# Patient Record
Sex: FEMALE | Race: WHITE | NOT HISPANIC OR LATINO | Employment: OTHER | URBAN - METROPOLITAN AREA
[De-identification: names, ages, dates, MRNs, and addresses within clinical notes are randomized per-mention and may not be internally consistent; named-entity substitution may affect disease eponyms.]

---

## 2020-04-03 ENCOUNTER — TRANSFERRED RECORDS (OUTPATIENT)
Dept: HEALTH INFORMATION MANAGEMENT | Facility: CLINIC | Age: 35
End: 2020-04-03

## 2020-04-10 ENCOUNTER — TRANSFERRED RECORDS (OUTPATIENT)
Dept: HEALTH INFORMATION MANAGEMENT | Facility: CLINIC | Age: 35
End: 2020-04-10

## 2020-04-10 LAB
HPV ABSTRACT: NORMAL
PAP-ABSTRACT: NORMAL

## 2020-07-16 ENCOUNTER — TRANSFERRED RECORDS (OUTPATIENT)
Dept: HEALTH INFORMATION MANAGEMENT | Facility: CLINIC | Age: 35
End: 2020-07-16

## 2021-02-05 ENCOUNTER — TRANSFERRED RECORDS (OUTPATIENT)
Dept: HEALTH INFORMATION MANAGEMENT | Facility: CLINIC | Age: 36
End: 2021-02-05

## 2021-02-25 ENCOUNTER — TRANSFERRED RECORDS (OUTPATIENT)
Dept: HEALTH INFORMATION MANAGEMENT | Facility: CLINIC | Age: 36
End: 2021-02-25

## 2021-06-04 LAB
ALT SERPL-CCNC: 20 U/L (ref 8–45)
AST SERPL-CCNC: 15 U/L (ref 5–41)
TSH SERPL-ACNC: 0.54 UIU/ML (ref 0.47–5)

## 2021-06-15 ENCOUNTER — TRANSFERRED RECORDS (OUTPATIENT)
Dept: HEALTH INFORMATION MANAGEMENT | Facility: CLINIC | Age: 36
End: 2021-06-15

## 2021-07-15 ENCOUNTER — TRANSFERRED RECORDS (OUTPATIENT)
Dept: HEALTH INFORMATION MANAGEMENT | Facility: CLINIC | Age: 36
End: 2021-07-15

## 2021-08-13 ENCOUNTER — TRANSFERRED RECORDS (OUTPATIENT)
Dept: HEALTH INFORMATION MANAGEMENT | Facility: CLINIC | Age: 36
End: 2021-08-13

## 2021-08-15 ENCOUNTER — TRANSFERRED RECORDS (OUTPATIENT)
Dept: MULTI SPECIALTY CLINIC | Facility: CLINIC | Age: 36
End: 2021-08-15

## 2021-08-15 LAB
CREATININE (EXTERNAL): 0.97 MG/DL (ref 0.55–1.02)
GFR ESTIMATED (EXTERNAL): >60 ML/MIN
GFR ESTIMATED (IF AFRICAN AMERICAN) (EXTERNAL): >60 ML/MIN
GLUCOSE (EXTERNAL): 98 MG/DL (ref 70–110)
POTASSIUM (EXTERNAL): 4 MMOL/L (ref 3.5–5.1)

## 2021-09-23 ENCOUNTER — MEDICAL CORRESPONDENCE (OUTPATIENT)
Dept: HEALTH INFORMATION MANAGEMENT | Facility: CLINIC | Age: 36
End: 2021-09-23

## 2021-09-24 ENCOUNTER — TRANSCRIBE ORDERS (OUTPATIENT)
Dept: MULTI SPECIALTY CLINIC | Facility: CLINIC | Age: 36
End: 2021-09-24

## 2021-09-24 DIAGNOSIS — G89.29 CHRONIC JOINT PAIN: Primary | ICD-10-CM

## 2021-09-24 DIAGNOSIS — R76.8 POSITIVE DOUBLE STRANDED DNA ANTIBODY TEST: ICD-10-CM

## 2021-09-24 DIAGNOSIS — Z91.09 SUN ALLERGY: ICD-10-CM

## 2021-09-24 DIAGNOSIS — M25.50 CHRONIC JOINT PAIN: Primary | ICD-10-CM

## 2021-09-24 DIAGNOSIS — R70.0 ELEVATED ERYTHROCYTE SEDIMENTATION RATE: ICD-10-CM

## 2021-09-24 DIAGNOSIS — R21 BUTTERFLY RASH: ICD-10-CM

## 2021-10-13 ENCOUNTER — TRANSFERRED RECORDS (OUTPATIENT)
Dept: HEALTH INFORMATION MANAGEMENT | Facility: CLINIC | Age: 36
End: 2021-10-13
Payer: COMMERCIAL

## 2021-10-15 ENCOUNTER — TELEPHONE (OUTPATIENT)
Dept: RHEUMATOLOGY | Facility: CLINIC | Age: 36
End: 2021-10-15

## 2021-10-15 NOTE — TELEPHONE ENCOUNTER
Records/ office notes from [facility] received by fax.   Patient have appointment in January with Dr. Herrera.   Records sent to abstract for scanning.     STEPHEN Cortés [facility]

## 2021-11-22 ENCOUNTER — TELEPHONE (OUTPATIENT)
Dept: RHEUMATOLOGY | Facility: CLINIC | Age: 36
End: 2021-11-22
Payer: MEDICAID

## 2021-11-22 NOTE — TELEPHONE ENCOUNTER
Called patient and offered her an appt this week that became available due to a cancellation. Patient was agreeable and accepted.     LEIDA HankinsN, RN   Rheumatology RN Care Coordinator   [facility]   [phone]

## 2021-11-22 NOTE — TELEPHONE ENCOUNTER
[facility] Call Center    Phone Message    May a detailed message be left on voicemail: yes     Reason for Call: Other: Pt is calling for an urgent appointment /sooner appointment    Pt was told by her Dermatologist and PCP that Pt she needs to be seen sooner than January 2022.  Pt states her dermatologist had sent over records in October and requested if the Rheumatologist can get her in before January to be seen.    Pt's appt on January 12, 2022 with Dr. Neri needed to be reschedule for Dr. Herrera's schedule in  is changing in the new year.   Pt is upset and does not want to wait even longer to be seen.  Pt was reschedule for February 2022 now.     Pt states she is having more records sent over via fax but most records to be available through care everywhere.    Pt would like a call back from Dr. Herrera's team to let her know if they are able to get her in soon.    Action Taken: Message routed to:  Adult Clinics: Rheumatology p [phone]

## 2021-11-22 NOTE — TELEPHONE ENCOUNTER
There are no sooner appointments. Please add patient to waitlist and have them call periodically to check for cancellations.     LEIDA HankinsN, RN   Rheumatology RN Care Coordinator   [facility]   [phone]

## 2021-11-24 ENCOUNTER — OFFICE VISIT (OUTPATIENT)
Dept: RHEUMATOLOGY | Facility: CLINIC | Age: 36
End: 2021-11-24
Payer: MEDICAID

## 2021-11-24 VITALS
HEIGHT: 67 IN | OXYGEN SATURATION: 98 % | SYSTOLIC BLOOD PRESSURE: 118 MMHG | WEIGHT: 192 LBS | DIASTOLIC BLOOD PRESSURE: 83 MMHG | BODY MASS INDEX: 30.13 KG/M2 | HEART RATE: 99 BPM

## 2021-11-24 DIAGNOSIS — R76.8 POSITIVE DOUBLE STRANDED DNA ANTIBODY TEST: ICD-10-CM

## 2021-11-24 DIAGNOSIS — G89.29 CHRONIC JOINT PAIN: ICD-10-CM

## 2021-11-24 DIAGNOSIS — Z91.09 SUN ALLERGY: ICD-10-CM

## 2021-11-24 DIAGNOSIS — M25.50 CHRONIC JOINT PAIN: ICD-10-CM

## 2021-11-24 DIAGNOSIS — R21 BUTTERFLY RASH: ICD-10-CM

## 2021-11-24 DIAGNOSIS — R70.0 ELEVATED ERYTHROCYTE SEDIMENTATION RATE: ICD-10-CM

## 2021-11-24 PROBLEM — R07.1 CHEST PAIN ON BREATHING: Status: ACTIVE | Noted: 2021-11-24

## 2021-11-24 PROBLEM — R53.83 FATIGUE: Status: ACTIVE | Noted: 2021-11-24

## 2021-11-24 PROBLEM — L56.8 PHOTOSENSITIVITY: Status: ACTIVE | Noted: 2021-11-24

## 2021-11-24 LAB
ALBUMIN SERPL-MCNC: 3.9 G/DL (ref 3.4–5)
ALP SERPL-CCNC: 106 U/L (ref 40–150)
ALT SERPL W P-5'-P-CCNC: 33 U/L (ref 0–50)
ANION GAP SERPL CALCULATED.3IONS-SCNC: 5 MMOL/L (ref 3–14)
AST SERPL W P-5'-P-CCNC: 18 U/L (ref 0–45)
BILIRUB SERPL-MCNC: 0.5 MG/DL (ref 0.2–1.3)
BUN SERPL-MCNC: 13 MG/DL (ref 7–30)
CALCIUM SERPL-MCNC: 9.6 MG/DL (ref 8.5–10.1)
CHLORIDE BLD-SCNC: 107 MMOL/L (ref 94–109)
CO2 SERPL-SCNC: 28 MMOL/L (ref 20–32)
CREAT SERPL-MCNC: 0.72 MG/DL (ref 0.52–1.04)
CRP SERPL-MCNC: 3.1 MG/L (ref 0–8)
ERYTHROCYTE [DISTWIDTH] IN BLOOD BY AUTOMATED COUNT: 12.1 % (ref 10–15)
ERYTHROCYTE [SEDIMENTATION RATE] IN BLOOD BY WESTERGREN METHOD: 18 MM/HR (ref 0–20)
GFR SERPL CREATININE-BSD FRML MDRD: >90 ML/MIN/1.73M2
GLUCOSE BLD-MCNC: 99 MG/DL (ref 70–99)
HCT VFR BLD AUTO: 42.3 % (ref 35–47)
HGB BLD-MCNC: 14.1 G/DL (ref 11.7–15.7)
MCH RBC QN AUTO: 28.9 PG (ref 26.5–33)
MCHC RBC AUTO-ENTMCNC: 33.3 G/DL (ref 31.5–36.5)
MCV RBC AUTO: 87 FL (ref 78–100)
PLATELET # BLD AUTO: 395 10E3/UL (ref 150–450)
POTASSIUM BLD-SCNC: 3.9 MMOL/L (ref 3.4–5.3)
PROT SERPL-MCNC: 8.3 G/DL (ref 6.8–8.8)
RBC # BLD AUTO: 4.88 10E6/UL (ref 3.8–5.2)
SODIUM SERPL-SCNC: 140 MMOL/L (ref 133–144)
WBC # BLD AUTO: 7.1 10E3/UL (ref 4–11)

## 2021-11-24 PROCEDURE — 85613 RUSSELL VIPER VENOM DILUTED: CPT | Performed by: STUDENT IN AN ORGANIZED HEALTH CARE EDUCATION/TRAINING PROGRAM

## 2021-11-24 PROCEDURE — 86160 COMPLEMENT ANTIGEN: CPT | Performed by: STUDENT IN AN ORGANIZED HEALTH CARE EDUCATION/TRAINING PROGRAM

## 2021-11-24 PROCEDURE — 86235 NUCLEAR ANTIGEN ANTIBODY: CPT | Performed by: STUDENT IN AN ORGANIZED HEALTH CARE EDUCATION/TRAINING PROGRAM

## 2021-11-24 PROCEDURE — 85730 THROMBOPLASTIN TIME PARTIAL: CPT | Performed by: STUDENT IN AN ORGANIZED HEALTH CARE EDUCATION/TRAINING PROGRAM

## 2021-11-24 PROCEDURE — 85390 FIBRINOLYSINS SCREEN I&R: CPT | Performed by: PATHOLOGY

## 2021-11-24 PROCEDURE — 36415 COLL VENOUS BLD VENIPUNCTURE: CPT | Performed by: STUDENT IN AN ORGANIZED HEALTH CARE EDUCATION/TRAINING PROGRAM

## 2021-11-24 PROCEDURE — 86225 DNA ANTIBODY NATIVE: CPT | Performed by: STUDENT IN AN ORGANIZED HEALTH CARE EDUCATION/TRAINING PROGRAM

## 2021-11-24 PROCEDURE — 86147 CARDIOLIPIN ANTIBODY EA IG: CPT | Performed by: STUDENT IN AN ORGANIZED HEALTH CARE EDUCATION/TRAINING PROGRAM

## 2021-11-24 PROCEDURE — 86140 C-REACTIVE PROTEIN: CPT | Performed by: STUDENT IN AN ORGANIZED HEALTH CARE EDUCATION/TRAINING PROGRAM

## 2021-11-24 PROCEDURE — 86200 CCP ANTIBODY: CPT | Performed by: STUDENT IN AN ORGANIZED HEALTH CARE EDUCATION/TRAINING PROGRAM

## 2021-11-24 PROCEDURE — 85027 COMPLETE CBC AUTOMATED: CPT | Performed by: STUDENT IN AN ORGANIZED HEALTH CARE EDUCATION/TRAINING PROGRAM

## 2021-11-24 PROCEDURE — 80053 COMPREHEN METABOLIC PANEL: CPT | Performed by: STUDENT IN AN ORGANIZED HEALTH CARE EDUCATION/TRAINING PROGRAM

## 2021-11-24 PROCEDURE — 86431 RHEUMATOID FACTOR QUANT: CPT | Performed by: STUDENT IN AN ORGANIZED HEALTH CARE EDUCATION/TRAINING PROGRAM

## 2021-11-24 PROCEDURE — 86038 ANTINUCLEAR ANTIBODIES: CPT | Performed by: STUDENT IN AN ORGANIZED HEALTH CARE EDUCATION/TRAINING PROGRAM

## 2021-11-24 PROCEDURE — 85652 RBC SED RATE AUTOMATED: CPT | Performed by: STUDENT IN AN ORGANIZED HEALTH CARE EDUCATION/TRAINING PROGRAM

## 2021-11-24 PROCEDURE — 99204 OFFICE O/P NEW MOD 45 MIN: CPT | Performed by: STUDENT IN AN ORGANIZED HEALTH CARE EDUCATION/TRAINING PROGRAM

## 2021-11-24 PROCEDURE — 86146 BETA-2 GLYCOPROTEIN ANTIBODY: CPT | Performed by: STUDENT IN AN ORGANIZED HEALTH CARE EDUCATION/TRAINING PROGRAM

## 2021-11-24 ASSESSMENT — MIFFLIN-ST. JEOR: SCORE: 1603.54

## 2021-11-24 ASSESSMENT — PAIN SCALES - GENERAL: PAINLEVEL: SEVERE PAIN (6)

## 2021-11-24 NOTE — NURSING NOTE
"Chief Complaint   Patient presents with     Consult     chronic joint pain, butterfy rash     New Patient     referred by [facility]       Initial /83 (BP Location: Left arm, Patient Position: Sitting, Cuff Size: Adult Large)   Pulse 99   Ht 1.702 m (5' 7\")   Wt 87.1 kg (192 lb)   SpO2 98%   BMI 30.07 kg/m   Estimated body mass index is 30.07 kg/m  as calculated from the following:    Height as of this encounter: 1.702 m (5' 7\").    Weight as of this encounter: 87.1 kg (192 lb)..      She requests these members of her care team be copied on today's visit information:     PCP: Kaushik Luna    Referring Provider:  [facility]  [id number] [place] [id number]    Do you need any medication refills at today's visit?      Ashley Barron MA   Email: [email or web address]  [facility] - Rheumatology  Phone: [phone]  Fax: [phone]      "

## 2021-11-24 NOTE — PROGRESS NOTES
Rheumatology Clinic Visit     Dasia Nguyen MRN# [id number]   YOB: 1985 Age: 36 year old     Date of Visit: Nov 24, 2021   Primary care provider: Kaushik Luna          Assessment and Plan:     Assessment     Polyarthralgia  Facial rash - cutaneous lupus - seen dermatologist  Recurrent oral sores  Chest pain  Fatigue  Headaches  Neg AYAN, RF, ACPA  +dsDNA - 11, Neg SSA/SSB, centromere, Scl-70, Sm.     Ms. Nguyen is 36 year old female seen in clinic for evaluation of autoimmune connective tissue disease.    Positive dsDNA : For the last 2 years she has multiple symptoms including fatigue, joint pains, facial rash, photosensitivity, chest pain, recurrent oral sores. Her autoimmune work up showed negative AYAN but mildly elevated dsDNA which raised concern about systemic lupus. Her facial rash was diagnosed as acute cutaneous lupus by her dermatologist. On exam today she has mild tenderness over MCP, PIP joints, ankles and MTP joints but no synovitis noted.     I will repeat her autoimmune serologies - AYAN, KIANNA, dsDNA, C3/C4, ESR, CRP, CBC, CMP. Her symptom complex does raise concern about systemic lupus or undifferentiated connective tissue disease. No need of systemic steroids. Will give her information about Plaquenil.     Chest pain : Following up with Cardiologist. She has Holter monitor placed. Reports chest pain episodes when she takes deep breaths.     Seizure like episodes : Seen by Neurology, and EEG is normal. She is getting cardiac evaluation to make sure that seizure like episodes are not related to arrhythmias       Plan    Blood tests to be done today     Send her information about Plaquenil     Your symptoms raise concern about systemic lupus     Follow up in 2 months     -- Orders placed this encounter  Orders Placed This Encounter   Procedures     Anti Nuclear Cathi IgG by IFA with Reflex     KIANNA antibody panel     Complement C3     Complement C4     DNA ANTIBODY, NATV/2 STRAND      Centromere Antibody IgG     Erythrocyte sedimentation rate auto     CRP inflammation     CBC with platelets     Comprehensive metabolic panel     Rheumatoid factor     Cyclic Citrullinated Peptide Antibody IgG     Lupus Anticoagulant Panel     Beta 2 Glycoprotein 1 Antibody IgG     Beta 2 Glycoprotein 1 Antibody IgM     Cardiolipin Cathi IgG and IgM       TIME SPENT:   A total of 60 minutes was spent on the patient today, greater than 50% of that time was spent on face to face counseling and care coordination regarding diagnoses and treatment options as mentioned above.                    Active Problem List:     Patient Active Problem List    Diagnosis Date Noted     Chest pain on breathing 11/24/2021     Priority: Medium     Fatigue 11/24/2021     Priority: Medium     Multiple joint pain 11/24/2021     Priority: Medium     Photosensitivity 11/24/2021     Priority: Medium            History of Present Illness:   Dasia Nguyen is a 36 year old female with PMH of facial rash, joint pains, chest pain, +dsdna seen in the clinic in consultation at request of  for evaluation of autoimmune connective tissue disease.     She was in her usual state of health up until 3 years ago when she started noticing fatigue, joint pains, on and off chest pains. Her symptoms came as flare ups when all her symptoms would increase many folds. Her last severe flare was summer 2021 She had severe body aches, joint pains and could not get out of bed for couple weeks. In addition she was extremely fatigued and had headaches, dizziness, chest pains and oral sores. She also noticed being very photosensitive and developed facial rash. She saw [facility] who diagnosed her with acute cutaneous lupus.     Beginning May 2021, she started having seizures like episodes at night. She describes it as hot tingling sensation and her body will shake for 15 - 20 sec. She has fallen out of bed during those episodes.  She was seen by Neurologist and had EEG which did not show epilepsy. MRI head, C-spine, T-spine did not show demyelinated lesions. She was referred to cardiology to make sure that her symptoms are not related to Arrhythmias. Echocardiogram was normal. CT chest showed some calcification in the region of LAD.     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 Her ankles will hurt. In the morning she does not have strength to grasp things. She gets pain in her wrists, elbows. She has fatigue and feel she has flu every day. She has headaches every day. She takes naps during the day. Her chest hurt, feel as if someone is stepping on it. She always take shallow breaths. She was given medrol dos russ and responded well to it.     She has herniated discs in her back and always had chronic low back pain.    She gets recurrent UTI and possibly had kidney stones.     Autoimmune work up showed Neg AYAN, KIANNA, RF, ACPA but positive dsDNA. She had mildly elevated ESR of 26 in 6/2021.   Denies any raynauds, sicca symptoms, recurrent sinusitis/rhinitis, swallowing difficulty, hearing or visual changes recently. No h/o arterial/venous thrombosis in the past. Hx of one 5 week pregnancy loss.           Review of Systems:     Review Of Systems  Constitutional: denies fever, chills, night sweats and weight loss.  Skin: + skin rash.  Eyes: No dryness or irritation in eyes. No episode of eye inflammation or redness.   Ears/Nose/Throat: no recurrent sinus infections.  Respiratory: No shortness of breath, dyspnea on exertion, cough, or hemoptysis  Cardiovascular: + chest pain or palpitations.  Gastrointestinal: no nausea, vomiting, abdominal pain.  Normal bowel movements.  Genitourinary: no dysuria, frequency  or hematuria.  Musculoskeletal: as in HPI  Neurologic: no numbness, tingling.  Psychiatric: no mood disorders.  Hematologic/Lymphatic/Immunologic: no history of easy bruising, petechia or purpura.  No abnormal  "bleeding.   Endocrine: no h/o thyroid disease or Diabetes.                  Past Medical History:     Past Medical History:   Diagnosis Date     Chest pain on breathing      Fatigue      Multiple joint pain      Photosensitivity      Past Surgical History:   Procedure Laterality Date     NO HISTORY OF SURGERY              Social History:     Social History     Occupational History     Not on file   Tobacco Use     Smoking status: Never Smoker     Smokeless tobacco: Never Used   Substance and Sexual Activity     Alcohol use: Not Currently     Drug use: Never     Sexual activity: Not on file            Family History:     Family History   Problem Relation Age of Onset     Lupus Maternal Uncle      Sarcoidosis Maternal Aunt             Allergies:     Allergies   Allergen Reactions     Other Environmental Allergy      Cat dander            Medications:     Current Outpatient Medications   Medication Sig Dispense Refill     Multiple Vitamin (MULTIVITAMINS PO)               Physical Exam:   Blood pressure 118/83, pulse 99, height 1.702 m (5' 7\"), weight 87.1 kg (192 lb), SpO2 98 %.  Wt Readings from Last 4 Encounters:   11/24/21 87.1 kg (192 lb)       Constitutional: obesity, appearing stated age; cooperative  Eyes: nl EOM, PERRLA, vision, conjunctiva, sclera  ENT: nl external ears, nose, hearing, lips, teeth, gums, throat  No mucous membrane lesions, normal saliva pool  Neck: no mass or thyroid enlargement  Resp: lungs clear to auscultation, nl to palpation  CV: RRR, no murmurs, rubs or gallops, no edema  GI: no ABD mass or tenderness, no HSM  : not tested  Lymph: no cervical, supraclavicular, inguinal or epitrochlear nodes    MS: All TMJ, neck, shoulder, elbow, wrist, MCP/PIP/DIP, spine, hip, knee, ankle, and foot MTP/IP joints were examined.   -- Tenderness over MCP, PIP joints, b/l wrists, elbows, ankles. no synovitis noted. Full ROM.  Normal  strength.   -- No dactylitis,  tenosynovitis, enthesopathy.    Skin: " no nail pitting, alopecia, rash, nodules or lesions  Neuro: nl cranial nerves, strength, sensation, DTRs.   Psych: nl judgement, orientation, memory, affect.         Data:     No results found for any visits on 11/24/21.    No results for input(s): WBC, RBC, HGB, HCT, MCV, RDW, PLT, CCPABG, ALBUMIN, CRP, BUN, CREAT in the last 52432 hours.    Invalid input(s): MCT,  AST,  ALT,  ESR   No results for input(s): TSH, T4 in the last 84256 hours.  No results found for: CCPABG, CCPABY, RF8, HGB, BUN, CREAT, SED, CRP, AST, CARIA, ANCA, HU0190, ALBUMIN, ALKPHOS, ALT, AST, [id number], [id number], CRP, [id number], RHF  No lab results found.    Invalid input(s): SEDRATE, TBILI, NH3     Outside studies reviewed: Results from [facility] reviewed     Reviewed Rheumatology lab flowsheet    Felicita Herrera MD  [facility]  Department of Rheumatology & Autoimmune Disorders  [facility]: [phone]   Pager - [phone]

## 2021-11-26 LAB
ANA SER QL IF: NEGATIVE
B2 GLYCOPROT1 IGG SERPL IA-ACNC: <0.8 U/ML
B2 GLYCOPROT1 IGM SERPL IA-ACNC: <2.4 U/ML
C3 SERPL-MCNC: 153 MG/DL (ref 81–157)
C4 SERPL-MCNC: 38 MG/DL (ref 13–39)
CARDIOLIPIN IGG SER IA-ACNC: <2 GPL-U/ML
CARDIOLIPIN IGG SER IA-ACNC: NEGATIVE
CARDIOLIPIN IGM SER IA-ACNC: <2 MPL-U/ML
CARDIOLIPIN IGM SER IA-ACNC: NEGATIVE
CCP AB SER IA-ACNC: 1.1 U/ML
CENTROMERE B AB SER-ACNC: <0.7 U/ML
CENTROMERE B AB SER-ACNC: NEGATIVE
DRVVT SCREEN RATIO: 1.11
DSDNA AB SER-ACNC: 6 IU/ML
ENA SM IGG SER IA-ACNC: <1.6 U/ML
ENA SM IGG SER IA-ACNC: NEGATIVE
ENA SS-A AB SER IA-ACNC: <0.5 U/ML
ENA SS-A AB SER IA-ACNC: NEGATIVE
ENA SS-B IGG SER IA-ACNC: <0.6 U/ML
ENA SS-B IGG SER IA-ACNC: NEGATIVE
INR PPP: 1.07 (ref 0.85–1.15)
LA PPP-IMP: NEGATIVE
LUPUS INTERPRETATION: NORMAL
PTT RATIO: 1.13
RHEUMATOID FACT SER NEPH-ACNC: <7 IU/ML
THROMBIN TIME: 17.1 SECONDS (ref 13–19)
U1 SNRNP IGG SER IA-ACNC: <1.1 U/ML
U1 SNRNP IGG SER IA-ACNC: NEGATIVE

## 2021-12-01 NOTE — RESULT ENCOUNTER NOTE
Arlyn Rossi,    Your autoimmune work up has shown negative AYAN and dsDNA. Other specific tests for lupus including anti Dougherty antibody is negative as well. Rheumatoid arthritis tests are negative. Antiphospholipid labs are negative. I can understand that during flares your inflammation markers, complement levels and dsDNA can change but in systemic lupus patients AYAN and other antibodies do not change weather in flare or not. You do not  meet criteria for systemic lupus but because of your facial rash possible acute cutaneous lupus we can try Plaquenil and hope that it helps with other symptoms too. Let me know if you have any questions.     Felicita Herrera MD

## 2021-12-04 ENCOUNTER — HEALTH MAINTENANCE LETTER (OUTPATIENT)
Age: 36
End: 2021-12-04

## 2022-01-09 ENCOUNTER — TRANSFERRED RECORDS (OUTPATIENT)
Dept: HEALTH INFORMATION MANAGEMENT | Facility: CLINIC | Age: 37
End: 2022-01-09
Payer: COMMERCIAL

## 2022-04-02 DIAGNOSIS — L93.2 CUTANEOUS LUPUS ERYTHEMATOSUS: ICD-10-CM

## 2022-04-02 DIAGNOSIS — R76.8 POSITIVE DOUBLE STRANDED DNA ANTIBODY TEST: ICD-10-CM

## 2022-04-04 RX ORDER — HYDROXYCHLOROQUINE SULFATE 200 MG/1
TABLET, FILM COATED ORAL
Qty: 180 TABLET | OUTPATIENT
Start: 2022-04-04

## 2022-04-05 NOTE — TELEPHONE ENCOUNTER
Plaquenil    Please complete annual eye exam/plaquenil screening for refills. - Oral  Last Written Prescription Date:  3/25/22  Last Fill Quantity: 60,   # refills: 1   Last Office Visit : 11/24/21  Future Office visit:  6/29/22  Last Eye Exam: not found, per chart,MyChart sent to patient - eye exam 4/6/22 outside. Further refills after eye exam documentation obtained.

## 2022-05-09 ENCOUNTER — MYC MEDICAL ADVICE (OUTPATIENT)
Dept: RHEUMATOLOGY | Facility: CLINIC | Age: 37
End: 2022-05-09
Payer: COMMERCIAL

## 2022-06-07 NOTE — TELEPHONE ENCOUNTER
Plaquenil eye exam abstracted into flow sheets. Letter to be scanned into chart.      STEFANIA Hester  Rheumatology/Infectious disease  [facility]   [phone]

## 2022-07-06 ENCOUNTER — OFFICE VISIT (OUTPATIENT)
Dept: RHEUMATOLOGY | Facility: CLINIC | Age: 37
End: 2022-07-06
Payer: COMMERCIAL

## 2022-07-06 VITALS
OXYGEN SATURATION: 99 % | WEIGHT: 194 LBS | HEART RATE: 75 BPM | SYSTOLIC BLOOD PRESSURE: 103 MMHG | DIASTOLIC BLOOD PRESSURE: 72 MMHG | BODY MASS INDEX: 30.38 KG/M2

## 2022-07-06 DIAGNOSIS — L93.2 CUTANEOUS LUPUS ERYTHEMATOSUS: ICD-10-CM

## 2022-07-06 DIAGNOSIS — G89.29 CHRONIC JOINT PAIN: ICD-10-CM

## 2022-07-06 DIAGNOSIS — M25.50 CHRONIC JOINT PAIN: ICD-10-CM

## 2022-07-06 DIAGNOSIS — R76.8 POSITIVE DOUBLE STRANDED DNA ANTIBODY TEST: Primary | ICD-10-CM

## 2022-07-06 LAB
ALBUMIN SERPL-MCNC: 3.8 G/DL (ref 3.4–5)
ALBUMIN UR-MCNC: NEGATIVE MG/DL
ALP SERPL-CCNC: 89 U/L (ref 40–150)
ALT SERPL W P-5'-P-CCNC: 22 U/L (ref 0–50)
ANION GAP SERPL CALCULATED.3IONS-SCNC: 3 MMOL/L (ref 3–14)
APPEARANCE UR: CLEAR
AST SERPL W P-5'-P-CCNC: 10 U/L (ref 0–45)
BILIRUB SERPL-MCNC: 0.6 MG/DL (ref 0.2–1.3)
BILIRUB UR QL STRIP: NEGATIVE
BUN SERPL-MCNC: 12 MG/DL (ref 7–30)
CALCIUM SERPL-MCNC: 9.7 MG/DL (ref 8.5–10.1)
CHLORIDE BLD-SCNC: 109 MMOL/L (ref 94–109)
CO2 SERPL-SCNC: 29 MMOL/L (ref 20–32)
COLOR UR AUTO: COLORLESS
CREAT SERPL-MCNC: 0.75 MG/DL (ref 0.52–1.04)
ERYTHROCYTE [DISTWIDTH] IN BLOOD BY AUTOMATED COUNT: 12.3 % (ref 10–15)
GFR SERPL CREATININE-BSD FRML MDRD: >90 ML/MIN/1.73M2
GLUCOSE BLD-MCNC: 108 MG/DL (ref 70–99)
GLUCOSE UR STRIP-MCNC: NEGATIVE MG/DL
HCT VFR BLD AUTO: 40.4 % (ref 35–47)
HGB BLD-MCNC: 13.6 G/DL (ref 11.7–15.7)
HGB UR QL STRIP: NEGATIVE
KETONES UR STRIP-MCNC: NEGATIVE MG/DL
LEUKOCYTE ESTERASE UR QL STRIP: NEGATIVE
MCH RBC QN AUTO: 28.6 PG (ref 26.5–33)
MCHC RBC AUTO-ENTMCNC: 33.7 G/DL (ref 31.5–36.5)
MCV RBC AUTO: 85 FL (ref 78–100)
NITRATE UR QL: NEGATIVE
PH UR STRIP: 6.5 [PH] (ref 5–7)
PLATELET # BLD AUTO: 314 10E3/UL (ref 150–450)
POTASSIUM BLD-SCNC: 4.4 MMOL/L (ref 3.4–5.3)
PROT SERPL-MCNC: 7.7 G/DL (ref 6.8–8.8)
RBC # BLD AUTO: 4.76 10E6/UL (ref 3.8–5.2)
RBC #/AREA URNS AUTO: NORMAL /HPF
SKIP: NORMAL
SODIUM SERPL-SCNC: 141 MMOL/L (ref 133–144)
SP GR UR STRIP: 1.01 (ref 1–1.03)
UROBILINOGEN UR STRIP-MCNC: NORMAL MG/DL
WBC # BLD AUTO: 6.2 10E3/UL (ref 4–11)
WBC #/AREA URNS AUTO: NORMAL /HPF

## 2022-07-06 PROCEDURE — 84156 ASSAY OF PROTEIN URINE: CPT | Performed by: STUDENT IN AN ORGANIZED HEALTH CARE EDUCATION/TRAINING PROGRAM

## 2022-07-06 PROCEDURE — 82657 ENZYME CELL ACTIVITY: CPT | Mod: 90 | Performed by: STUDENT IN AN ORGANIZED HEALTH CARE EDUCATION/TRAINING PROGRAM

## 2022-07-06 PROCEDURE — 36415 COLL VENOUS BLD VENIPUNCTURE: CPT | Performed by: STUDENT IN AN ORGANIZED HEALTH CARE EDUCATION/TRAINING PROGRAM

## 2022-07-06 PROCEDURE — 85027 COMPLETE CBC AUTOMATED: CPT | Performed by: STUDENT IN AN ORGANIZED HEALTH CARE EDUCATION/TRAINING PROGRAM

## 2022-07-06 PROCEDURE — 86160 COMPLEMENT ANTIGEN: CPT | Performed by: STUDENT IN AN ORGANIZED HEALTH CARE EDUCATION/TRAINING PROGRAM

## 2022-07-06 PROCEDURE — 86038 ANTINUCLEAR ANTIBODIES: CPT | Performed by: STUDENT IN AN ORGANIZED HEALTH CARE EDUCATION/TRAINING PROGRAM

## 2022-07-06 PROCEDURE — 99214 OFFICE O/P EST MOD 30 MIN: CPT | Performed by: STUDENT IN AN ORGANIZED HEALTH CARE EDUCATION/TRAINING PROGRAM

## 2022-07-06 PROCEDURE — 86225 DNA ANTIBODY NATIVE: CPT | Performed by: STUDENT IN AN ORGANIZED HEALTH CARE EDUCATION/TRAINING PROGRAM

## 2022-07-06 PROCEDURE — 81001 URINALYSIS AUTO W/SCOPE: CPT | Performed by: STUDENT IN AN ORGANIZED HEALTH CARE EDUCATION/TRAINING PROGRAM

## 2022-07-06 PROCEDURE — 80053 COMPREHEN METABOLIC PANEL: CPT | Performed by: STUDENT IN AN ORGANIZED HEALTH CARE EDUCATION/TRAINING PROGRAM

## 2022-07-06 PROCEDURE — 99000 SPECIMEN HANDLING OFFICE-LAB: CPT | Performed by: STUDENT IN AN ORGANIZED HEALTH CARE EDUCATION/TRAINING PROGRAM

## 2022-07-06 RX ORDER — DIPHENHYDRAMINE HCL 25 MG
2-4 TABLET ORAL EVERY 6 HOURS PRN
COMMUNITY

## 2022-07-06 ASSESSMENT — PAIN SCALES - GENERAL: PAINLEVEL: SEVERE PAIN (7)

## 2022-07-06 NOTE — PROGRESS NOTES
Rheumatology Clinic Visit     Dasia Nguyen MRN# [id number]   YOB: 1985 Age: 36 year old     Date of Visit: Jul 6, 2022   Primary care provider: Kaushik Luna          Assessment and Plan:     Assessment     Polyarthralgia  Facial rash - cutaneous lupus - seen dermatologist  Recurrent oral sores  Chest pain  Fatigue  Headaches  Neg AYAN, RF, ACPA  +dsDNA - 11, Neg SSA/SSB, centromere, Scl-70, Sm.     Ms. Nguyen is 37 year old female seen in clinic for evaluation of autoimmune connective tissue disease.    Positive dsDNA : For the last 2 years she has multiple symptoms including fatigue, joint pains, facial rash, photosensitivity, chest pain, recurrent oral sores. Her autoimmune work up showed negative AYAN but mildly elevated dsDNA which raised concern about systemic lupus. Her facial rash was diagnosed as acute cutaneous lupus by her dermatologist. On exam today she has mild tenderness over MCP, PIP joints, ankles and MTP joints but no synovitis noted.     She is on Plaquenil which helped with her facial rash. Denies any side effects with HCQ use. Will continue Plaquenil 200 mg daily.     I will repeat her autoimmune serologies - AYAN, KIANNA, dsDNA, C3/C4, ESR, CRP, CBC, CMP. Her symptom complex does raise concern about systemic lupus or undifferentiated connective tissue disease. No need of systemic steroids.     Seizure like episodes : Seen by Neurology, and EEG is normal. Her seizures are not related to Epilepsy. She will follow up with Neurology if symptoms worsens.     Plan    Will get blood tests     No inflammation noted on joint exam     Your symptoms of oral sores, facial rash can be seen in undifferentiated autoimmune disease.     Follow up in 2 - 3 months     -- Orders placed this encounter  Orders Placed This Encounter   Procedures     DNA ANTIBODY, NATV/2 STRAND     Complement C3     Complement C4     Thiopurine Methyltransferase RBC     UA with microscopic     Protein  random urine      Anti Nuclear Cathi IgG by IFA with Reflex     CBC with platelets     Comprehensive metabolic panel     Urine Microscopic Exam     Protein  random urine       TT 30 min was spent on date of the encounter doing chart review, history and exam, documentation and further activities as noted above. Any prior notes, outside records, laboratory results, and imaging studies were reviewed if relevant.    Patient verbalized agreement with and understanding of the rationale for the diagnosis and treatment plan.  All questions were answered to best of my ability and the patient's satisfaction.      Chart documentation done in part with Dragon Voice recognition Software. Although reviewed after completion, some word and grammatical error may remain.                  Active Problem List:     Patient Active Problem List    Diagnosis Date Noted     Systemic lupus erythematosus (H) 08/06/2022     Priority: Medium     Chest pain on breathing 11/24/2021     Priority: Medium     Fatigue 11/24/2021     Priority: Medium     Multiple joint pain 11/24/2021     Priority: Medium     Photosensitivity 11/24/2021     Priority: Medium            History of Present Illness:   Dasia Nguyen is a 36 year old female with PMH of facial rash, joint pains, chest pain, +dsdna seen in the clinic in consultation at request of  for evaluation of autoimmune connective tissue disease.     She was in her usual state of health up until 3 years ago when she started noticing fatigue, joint pains, on and off chest pains. Her symptoms came as flare ups when all her symptoms would increase many folds. Her last severe flare was summer 2021 She had severe body aches, joint pains and could not get out of bed for couple weeks. In addition she was extremely fatigued and had headaches, dizziness, chest pains and oral sores. She also noticed being very photosensitive and developed facial rash. She saw dermatologist at [facility] who diagnosed  her with acute cutaneous lupus.     Beginning May 2021, she started having seizures like episodes at night. She describes it as hot tingling sensation and her body will shake for 15 - 20 sec. She has fallen out of bed during those episodes. She was seen by Neurologist and had EEG which did not show epilepsy. MRI head, C-spine, T-spine did not show demyelinated lesions. She was referred to cardiology to make sure that her symptoms are not related to Arrhythmias. Echocardiogram was normal. CT chest showed some calcification in the region of LAD.     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 Her ankles will hurt. In the morning she does not have strength to grasp things. She gets pain in her wrists, elbows. She has fatigue and feel she has flu every day. She has headaches every day. She takes naps during the day. Her chest hurt, feel as if someone is stepping on it. She always take shallow breaths. She was given medrol dos russ and responded well to it.     She has herniated discs in her back and always had chronic low back pain.    She gets recurrent UTI and possibly had kidney stones.     Autoimmune work up showed Neg AYAN, KIANNA, RF, ACPA but positive dsDNA. She had mildly elevated ESR of 26 in 6/2021.   Denies any raynauds, sicca symptoms, recurrent sinusitis/rhinitis, swallowing difficulty, hearing or visual changes recently. No h/o arterial/venous thrombosis in the past. Hx of one 5 week pregnancy loss.      July 6, 2022 - Plaquenil has helped with her facial rash. 2 months ago she started having seizures. When she is on flare up she gets seizures 5 hours straight. Whole body is so tired, she is sleeping 13 hours a day. She has had EEG which was normal. She was told to take Benadryl when it will happen.  She is shaking, she does not have control, hitting herself. She has overall feeling of being sick, can not get out of bed.  She has 5 kids. She has feeling of being sick all the time. She has  pain in her joints. She gets pain in her thumb joints, pinky, big toes. She has noticed that when she is very tired she gets flutters in her chest. She gets oral sores, big ones on the inner lip, right nostril. She has tried prednisone and felt that it helped with inflammation, joint aches. Every couple months she gets very sick. When she is walking a lot she gets swelling and puffiness in her ankles. She takes Ibuprofen as needed.          Review of Systems:     Review Of Systems  Constitutional: denies fever, chills, night sweats and weight loss.  Skin: + skin rash.  Eyes: No dryness or irritation in eyes. No episode of eye inflammation or redness.   Ears/Nose/Throat: no recurrent sinus infections.  Respiratory: No shortness of breath, dyspnea on exertion, cough, or hemoptysis  Cardiovascular: + chest pain or palpitations.  Gastrointestinal: no nausea, vomiting, abdominal pain.  Normal bowel movements.  Genitourinary: no dysuria, frequency  or hematuria.  Musculoskeletal: as in HPI  Neurologic: no numbness, tingling.  Psychiatric: no mood disorders.  Hematologic/Lymphatic/Immunologic: no history of easy bruising, petechia or purpura.  No abnormal bleeding.   Endocrine: no h/o thyroid disease or Diabetes.                  Past Medical History:     Past Medical History:   Diagnosis Date     Chest pain on breathing      Fatigue      Multiple joint pain      Photosensitivity      Past Surgical History:   Procedure Laterality Date     NO HISTORY OF SURGERY              Social History:     Social History     Occupational History     Not on file   Tobacco Use     Smoking status: Never Smoker     Smokeless tobacco: Never Used   Substance and Sexual Activity     Alcohol use: Not Currently     Drug use: Never     Sexual activity: Not on file            Family History:     Family History   Problem Relation Age of Onset     Lupus Maternal Uncle      Sarcoidosis Maternal Aunt             Allergies:     Allergies   Allergen  Reactions     Other Environmental Allergy      Cat dander            Medications:     Current Outpatient Medications   Medication Sig Dispense Refill     calcipotriene (DOVONOX) 0.005 % external ointment APPLY TOPICALLY TO FACE AS NEEDED RASH       diphenhydrAMINE (BENADRYL) 25 MG tablet Take 2-4 tablets by mouth As needed for seizure       hydroxychloroquine (PLAQUENIL) 200 MG tablet TAKE 1 TABLET BY MOUTH TWICE DAILY 180 tablet 1     Multiple Vitamin (MULTIVITAMINS PO)        folic acid (FOLVITE) 1 MG tablet Take 1 tablet (1 mg) by mouth daily 90 tablet 3     magic mouthwash (ENTER INGREDIENTS IN COMMENTS) suspension Guafenesin - 70cc  2% viscous lidocaine - 30cc  Diphenhydramine (12.5/5cc) - 200cc  Nystatin - 30 cc  Sucralfate - 100cc  Maalox - 50cc  Disp: 480 cc(16 oz)  Sig: Use 3 tsp.(15cc), t.i.d. Swish for 3 mins, gargle and expectorate for 2 weeks. Then use prn for maintenance. 480 mL 1     methotrexate 2.5 MG tablet TAKE 4 TABLETS BY MOUTH 1 TIME A WEEK. INCREASE BY 1 TABLET EVERY WEEK TO REACH 6 TABLETS 1 TIME A WEEK DOSE 72 tablet 0     predniSONE (DELTASONE) 5 MG tablet 4tab=20 mg qd x 5 days, 3tab=15 mg qd x 5 days, 2tab=10 mg qd x 5 days, 1 tab=5 mg qd x 5 days then stop 100 tablet 0            Physical Exam:   Blood pressure 103/72, pulse 75, weight 88 kg (194 lb), SpO2 99 %.  Wt Readings from Last 4 Encounters:   07/06/22 88 kg (194 lb)   11/24/21 87.1 kg (192 lb)       Constitutional: obesity, appearing stated age; cooperative  Eyes: nl EOM, PERRLA, vision, conjunctiva, sclera  ENT: nl external ears, nose, hearing, lips, teeth, gums, throat  No mucous membrane lesions, normal saliva pool  Neck: no mass or thyroid enlargement  Resp: lungs clear to auscultation, nl to palpation  CV: RRR, no murmurs, rubs or gallops, no edema  GI: no ABD mass or tenderness, no HSM  : not tested  Lymph: no cervical, supraclavicular, inguinal or epitrochlear nodes    MS: All TMJ, neck, shoulder, elbow, wrist,  MCP/PIP/DIP, spine, hip, knee, ankle, and foot MTP/IP joints were examined.   -- Tenderness over MCP, PIP joints, b/l wrists, elbows, ankles. no synovitis noted. Full ROM.  Normal  strength.   -- No dactylitis,  tenosynovitis, enthesopathy.    Skin: no nail pitting, alopecia, rash, nodules or lesions  Neuro: nl cranial nerves, strength, sensation, DTRs.   Psych: nl judgement, orientation, memory, affect.         Data:     Results for orders placed or performed in visit on 07/06/22   Comprehensive metabolic panel     Status: Abnormal   Result Value Ref Range    Sodium 141 133 - 144 mmol/L    Potassium 4.4 3.4 - 5.3 mmol/L    Chloride 109 94 - 109 mmol/L    Carbon Dioxide (CO2) 29 20 - 32 mmol/L    Anion Gap 3 3 - 14 mmol/L    Urea Nitrogen 12 7 - 30 mg/dL    Creatinine 0.75 0.52 - 1.04 mg/dL    Calcium 9.7 8.5 - 10.1 mg/dL    Glucose 108 (H) 70 - 99 mg/dL    Alkaline Phosphatase 89 40 - 150 U/L    AST 10 0 - 45 U/L    ALT 22 0 - 50 U/L    Protein Total 7.7 6.8 - 8.8 g/dL    Albumin 3.8 3.4 - 5.0 g/dL    Bilirubin Total 0.6 0.2 - 1.3 mg/dL    GFR Estimate >90 >60 mL/min/1.73m2   CBC with platelets     Status: Normal   Result Value Ref Range    WBC Count 6.2 4.0 - 11.0 10e3/uL    RBC Count 4.76 3.80 - 5.20 10e6/uL    Hemoglobin 13.6 11.7 - 15.7 g/dL    Hematocrit 40.4 35.0 - 47.0 %    MCV 85 78 - 100 fL    MCH 28.6 26.5 - 33.0 pg    MCHC 33.7 31.5 - 36.5 g/dL    RDW 12.3 10.0 - 15.0 %    Platelet Count 314 150 - 450 10e3/uL   Anti Nuclear Cathi IgG by IFA with Reflex     Status: Normal   Result Value Ref Range    AYAN interpretation Negative Negative   UA with microscopic     Status: None   Result Value Ref Range    Color Urine Colorless Colorless, Straw, Light Yellow, Yellow    Appearance Urine Clear Clear    Glucose Urine Negative Negative mg/dL    Bilirubin Urine Negative Negative    Ketones Urine Negative Negative mg/dL    Specific Gravity Urine 1.009 0.999 - 1.035    Blood Urine Negative Negative    pH Urine 6.5  5.0 - 7.0    Protein Albumin Urine Negative Negative mg/dL    Urobilinogen Urine Normal Normal, 2.0 mg/dL    Nitrite Urine Negative Negative    Leukocyte Esterase Urine Negative Negative    SKIP     Thiopurine Methyltransferase RBC     Status: None   Result Value Ref Range    Thiopurine Methyltransferase RBC 25.0 24.0 - 44.0 U/mL   Complement C4     Status: Normal   Result Value Ref Range    C4 Complement 35 13 - 39 mg/dL   Complement C3     Status: Normal   Result Value Ref Range    C3 Complement 134 81 - 157 mg/dL   DNA ANTIBODY, NATV/2 STRAND     Status: Abnormal   Result Value Ref Range    DNA (ds) Antibody 18.0 (H) <10.0 IU/mL    Narrative    Negative:  Less than 10  Equivocal: 10-15  Positive:  Greater than 15   Urine Microscopic Exam     Status: Normal   Result Value Ref Range    RBC Urine None Seen 0-2 /HPF /HPF    WBC Urine None Seen 0-5 /HPF /HPF   Protein  random urine     Status: None   Result Value Ref Range    Total Protein Random Urine g/L <0.05 g/L    Total Protein Urine g/gr Creatinine      Creatinine Urine mg/dL 48 mg/dL       No results for input(s): WBC, RBC, HGB, HCT, MCV, RDW, PLT, CCPABG, ALBUMIN, CRP, BUN, CREAT in the last 08917 hours.    Invalid input(s): MCT,  AST,  ALT,  ESR   No results for input(s): TSH, T4 in the last 38847 hours.  Hemoglobin   Date Value Ref Range Status   07/06/2022 13.6 11.7 - 15.7 g/dL Final   11/24/2021 14.1 11.7 - 15.7 g/dL Final     Urea Nitrogen   Date Value Ref Range Status   07/06/2022 12 7 - 30 mg/dL Final   11/24/2021 13 7 - 30 mg/dL Final     Erythrocyte Sedimentation Rate   Date Value Ref Range Status   11/24/2021 18 0 - 20 mm/hr Final     CRP Inflammation   Date Value Ref Range Status   11/24/2021 3.1 0.0 - 8.0 mg/L Final     AST   Date Value Ref Range Status   07/06/2022 10 0 - 45 U/L Final   11/24/2021 18 0 - 45 U/L Final     Albumin   Date Value Ref Range Status   07/06/2022 3.8 3.4 - 5.0 g/dL Final   11/24/2021 3.9 3.4 - 5.0 g/dL Final     Alkaline  Phosphatase   Date Value Ref Range Status   07/06/2022 89 40 - 150 U/L Final   11/24/2021 106 40 - 150 U/L Final     ALT   Date Value Ref Range Status   07/06/2022 22 0 - 50 U/L Final   11/24/2021 33 0 - 50 U/L Final     Rheumatoid Factor   Date Value Ref Range Status   11/24/2021 <7 <12 IU/mL Final     Recent Labs   Lab Test 07/06/22  1153 11/24/21  1239   WBC 6.2 7.1   HGB 13.6 14.1   HCT 40.4 42.3   MCV 85 87    395   BUN 12 13   AST 10 18   ALT 22 33   ALKPHOS 89 106        [facility] studies reviewed: [facility]     Reviewed Rheumatology lab flowsheet    Felicita Herrera MD  [facility]  Department of Rheumatology & Autoimmune Disorders  [facility]: [phone]   Pager - [phone]

## 2022-07-06 NOTE — PATIENT INSTRUCTIONS
Will get blood tests     No inflammation noted on joint exam     Your symptoms of oral sores, facial rash can be seen in undifferentiated autoimmune disease.     Follow up in 2 - 3 months

## 2022-07-06 NOTE — NURSING NOTE
"Chief Complaint   Patient presents with     RECHECK       Initial /72 (BP Location: Left arm, Patient Position: Sitting, Cuff Size: Adult Large)   Pulse 75   Wt 88 kg (194 lb)   SpO2 99%   BMI 30.38 kg/m   Estimated body mass index is 30.38 kg/m  as calculated from the following:    Height as of 11/24/21: 1.702 m (5' 7\").    Weight as of this encounter: 88 kg (194 lb)..  She requests these members of her care team be copied on today's visit information:     PCP: Kaushik Luna      Do you need any medication refills at today's visit? NO     STEFANIA Cortés   Email: [email or web address]  [facility] - Rheumatology  Phone: [phone]  Fax: [phone]      "

## 2022-07-07 LAB
ANA SER QL IF: NEGATIVE
C3 SERPL-MCNC: 134 MG/DL (ref 81–157)
C4 SERPL-MCNC: 35 MG/DL (ref 13–39)
CREAT UR-MCNC: 48 MG/DL
DSDNA AB SER-ACNC: 18 IU/ML
PROT UR-MCNC: <0.05 G/L
PROT/CREAT 24H UR: NORMAL MG/G{CREAT}

## 2022-07-08 LAB — TPMT BLD-CCNC: 25 U/ML

## 2022-07-09 ENCOUNTER — MYC MEDICAL ADVICE (OUTPATIENT)
Dept: RHEUMATOLOGY | Facility: CLINIC | Age: 37
End: 2022-07-09

## 2022-07-09 DIAGNOSIS — M32.9 SYSTEMIC LUPUS ERYTHEMATOSUS, UNSPECIFIED SLE TYPE, UNSPECIFIED ORGAN INVOLVEMENT STATUS (H): ICD-10-CM

## 2022-07-09 DIAGNOSIS — K12.0 CANKER SORES ORAL: Primary | ICD-10-CM

## 2022-07-12 ENCOUNTER — MYC MEDICAL ADVICE (OUTPATIENT)
Dept: RHEUMATOLOGY | Facility: CLINIC | Age: 37
End: 2022-07-12

## 2022-07-12 DIAGNOSIS — M32.9 SYSTEMIC LUPUS ERYTHEMATOSUS, UNSPECIFIED SLE TYPE, UNSPECIFIED ORGAN INVOLVEMENT STATUS (H): Primary | ICD-10-CM

## 2022-07-12 RX ORDER — PREDNISONE 5 MG/1
TABLET ORAL
Qty: 100 TABLET | Refills: 0 | Status: SHIPPED | OUTPATIENT
Start: 2022-07-12 | End: 2022-09-08

## 2022-07-12 NOTE — RESULT ENCOUNTER NOTE
Arlyn Rossi,    Your blood work has shown elevated dsDNA. Rest of the labs including AYAN are negative. With recurrent sores, facial rash, joint pains, + dsDNA, you do meet criteria for systemic lupus. I will send you prednisone taper and will start Azathioprine.     Let me know if you have any concerns.     Felicita Herrera MD

## 2022-07-13 NOTE — TELEPHONE ENCOUNTER
Patient would like try methotrexate instead of azathioprine. Please advise.      STEFANIA Hester  Rheumatology/Infectious disease  [facility]   [phone]

## 2022-07-14 ENCOUNTER — LAB (OUTPATIENT)
Dept: LAB | Facility: OTHER | Age: 37
End: 2022-07-14
Payer: COMMERCIAL

## 2022-07-14 DIAGNOSIS — M32.9 SYSTEMIC LUPUS ERYTHEMATOSUS, UNSPECIFIED SLE TYPE, UNSPECIFIED ORGAN INVOLVEMENT STATUS (H): ICD-10-CM

## 2022-07-14 DIAGNOSIS — K12.0 CANKER SORES ORAL: ICD-10-CM

## 2022-07-14 PROCEDURE — 87340 HEPATITIS B SURFACE AG IA: CPT

## 2022-07-14 PROCEDURE — 86481 TB AG RESPONSE T-CELL SUSP: CPT

## 2022-07-14 PROCEDURE — 36415 COLL VENOUS BLD VENIPUNCTURE: CPT

## 2022-07-14 PROCEDURE — 86704 HEP B CORE ANTIBODY TOTAL: CPT

## 2022-07-14 PROCEDURE — 86803 HEPATITIS C AB TEST: CPT

## 2022-07-15 LAB
HBV CORE AB SERPL QL IA: NONREACTIVE
HBV SURFACE AG SERPL QL IA: NONREACTIVE
HCV AB SERPL QL IA: NONREACTIVE

## 2022-07-17 LAB
GAMMA INTERFERON BACKGROUND BLD IA-ACNC: 0 IU/ML
M TB IFN-G BLD-IMP: NEGATIVE
M TB IFN-G CD4+ BCKGRND COR BLD-ACNC: 10 IU/ML
MITOGEN IGNF BCKGRD COR BLD-ACNC: 0 IU/ML
MITOGEN IGNF BCKGRD COR BLD-ACNC: 0 IU/ML
QUANTIFERON MITOGEN: 10 IU/ML
QUANTIFERON NIL TUBE: 0 IU/ML
QUANTIFERON TB1 TUBE: 0 IU/ML
QUANTIFERON TB2 TUBE: 0

## 2022-07-18 RX ORDER — FOLIC ACID 1 MG/1
1 TABLET ORAL DAILY
Qty: 90 TABLET | Refills: 3 | Status: SHIPPED | OUTPATIENT
Start: 2022-07-18 | End: 2023-03-24

## 2022-07-18 NOTE — RESULT ENCOUNTER NOTE
Hello En[place],    Hep B/C and TB tests are negative. Will start Methotrexate.     Felicita Herrera MD

## 2022-07-26 DIAGNOSIS — M32.9 SYSTEMIC LUPUS ERYTHEMATOSUS, UNSPECIFIED SLE TYPE, UNSPECIFIED ORGAN INVOLVEMENT STATUS (H): ICD-10-CM

## 2022-07-27 NOTE — TELEPHONE ENCOUNTER
METHOTREXATE 2.5MG TABLETS - YELLOW     TAKE 4 TABLETS BY MOUTH 1 TIME A WEEK. INCREASE BY 1 TABLET EVERY WEEK TO REACH 6 TABLETS 1 TIME A WEEK DOSE  Last Written Prescription Date:  7/22/22  Last Fill Quantity: 72,   # refills: 0  Last Office Visit: 7/6/22  Future Office visit:  9/8/22    CBC RESULTS: Recent Labs   Lab Test 07/06/22  1153   WBC 6.2   RBC 4.76   HGB 13.6   HCT 40.4   MCV 85   MCH 28.6   MCHC 33.7   RDW 12.3          Creatinine   Date Value Ref Range Status   07/06/2022 0.75 0.52 - 1.04 mg/dL Final   ]    Liver Function Studies -   Recent Labs   Lab Test 07/06/22  1153   PROTTOTAL 7.7   ALBUMIN 3.8   BILITOTAL 0.6   ALKPHOS 89   AST 10   ALT 22       Routing refill request to provider for review/approval because:  90 day request denied as 90 day supply+ sent 7/22/22

## 2022-08-06 PROBLEM — M32.9 SYSTEMIC LUPUS ERYTHEMATOSUS (H): Status: ACTIVE | Noted: 2022-08-06

## 2022-09-08 ENCOUNTER — OFFICE VISIT (OUTPATIENT)
Dept: RHEUMATOLOGY | Facility: CLINIC | Age: 37
End: 2022-09-08
Payer: COMMERCIAL

## 2022-09-08 ENCOUNTER — LAB (OUTPATIENT)
Dept: LAB | Facility: CLINIC | Age: 37
End: 2022-09-08
Payer: COMMERCIAL

## 2022-09-08 VITALS
DIASTOLIC BLOOD PRESSURE: 68 MMHG | HEART RATE: 79 BPM | OXYGEN SATURATION: 97 % | BODY MASS INDEX: 30.38 KG/M2 | WEIGHT: 194 LBS | SYSTOLIC BLOOD PRESSURE: 102 MMHG

## 2022-09-08 DIAGNOSIS — M32.9 SYSTEMIC LUPUS ERYTHEMATOSUS, UNSPECIFIED SLE TYPE, UNSPECIFIED ORGAN INVOLVEMENT STATUS (H): ICD-10-CM

## 2022-09-08 DIAGNOSIS — L93.2 CUTANEOUS LUPUS ERYTHEMATOSUS: ICD-10-CM

## 2022-09-08 DIAGNOSIS — M32.9 SYSTEMIC LUPUS ERYTHEMATOSUS, UNSPECIFIED SLE TYPE, UNSPECIFIED ORGAN INVOLVEMENT STATUS (H): Primary | ICD-10-CM

## 2022-09-08 DIAGNOSIS — R76.8 POSITIVE DOUBLE STRANDED DNA ANTIBODY TEST: ICD-10-CM

## 2022-09-08 LAB
CRP SERPL-MCNC: 3.95 MG/L
ERYTHROCYTE [DISTWIDTH] IN BLOOD BY AUTOMATED COUNT: 13.3 % (ref 10–15)
ERYTHROCYTE [SEDIMENTATION RATE] IN BLOOD BY WESTERGREN METHOD: 18 MM/HR (ref 0–20)
HCT VFR BLD AUTO: 39.9 % (ref 35–47)
HGB BLD-MCNC: 13.2 G/DL (ref 11.7–15.7)
MCH RBC QN AUTO: 29.1 PG (ref 26.5–33)
MCHC RBC AUTO-ENTMCNC: 33.1 G/DL (ref 31.5–36.5)
MCV RBC AUTO: 88 FL (ref 78–100)
PLATELET # BLD AUTO: 359 10E3/UL (ref 150–450)
RBC # BLD AUTO: 4.53 10E6/UL (ref 3.8–5.2)
WBC # BLD AUTO: 6 10E3/UL (ref 4–11)

## 2022-09-08 PROCEDURE — 85027 COMPLETE CBC AUTOMATED: CPT

## 2022-09-08 PROCEDURE — 86140 C-REACTIVE PROTEIN: CPT

## 2022-09-08 PROCEDURE — 85652 RBC SED RATE AUTOMATED: CPT

## 2022-09-08 PROCEDURE — 84460 ALANINE AMINO (ALT) (SGPT): CPT

## 2022-09-08 PROCEDURE — 84450 TRANSFERASE (AST) (SGOT): CPT

## 2022-09-08 PROCEDURE — 99214 OFFICE O/P EST MOD 30 MIN: CPT | Performed by: STUDENT IN AN ORGANIZED HEALTH CARE EDUCATION/TRAINING PROGRAM

## 2022-09-08 PROCEDURE — 36415 COLL VENOUS BLD VENIPUNCTURE: CPT

## 2022-09-08 PROCEDURE — 82565 ASSAY OF CREATININE: CPT

## 2022-09-08 PROCEDURE — 86225 DNA ANTIBODY NATIVE: CPT

## 2022-09-08 PROCEDURE — 82040 ASSAY OF SERUM ALBUMIN: CPT

## 2022-09-08 PROCEDURE — 86160 COMPLEMENT ANTIGEN: CPT

## 2022-09-08 RX ORDER — HYDROXYCHLOROQUINE SULFATE 200 MG/1
200 TABLET, FILM COATED ORAL 2 TIMES DAILY
Qty: 180 TABLET | Refills: 1 | Status: SHIPPED | OUTPATIENT
Start: 2022-09-08 | End: 2024-01-26

## 2022-09-08 NOTE — NURSING NOTE
Chief Complaint   Patient presents with     RECHECK     Positive double stranded DNA antibody test        Vitals:    09/08/22 1030   BP: 102/68   BP Location: Left arm   Patient Position: Sitting   Cuff Size: Adult Regular   Pulse: 79   SpO2: 97%   Weight: 88 kg (194 lb)       Body mass index is 30.38 kg/m .    FARNAZ Mahajan

## 2022-09-08 NOTE — PATIENT INSTRUCTIONS
Continue Methotrexate 6 tab once a week     Plaquenil 200 mg BID     Blood tests today     Follow up in 3 months with labs

## 2022-09-08 NOTE — PROGRESS NOTES
Rheumatology Clinic Visit     Dasia Nguyen MRN# [id number]   YOB: 1985 Age: 36 year old     Date of Visit: Sep 8, 2022   Primary care provider: Kaushik Luna          Assessment and Plan:     Assessment     Polyarthralgia  Facial rash - cutaneous lupus - seen dermatologist  Recurrent oral sores  Chest pain  Fatigue  Headaches  Neg AYAN, RF, ACPA  +dsDNA - 11, Neg SSA/SSB, centromere, Scl-70, Sm.     Ms. Nguyen is 37 year old female seen in clinic for evaluation of autoimmune connective tissue disease.    Positive dsDNA : For the last 2 years she has multiple symptoms including fatigue, joint pains, facial rash, photosensitivity, chest pain, recurrent oral sores. Her autoimmune work up showed negative AYAN but mildly elevated dsDNA which raised concern about systemic lupus. Her facial rash was diagnosed as acute cutaneous lupus by her dermatologist. On exam she had mild tenderness over MCP, PIP joints, ankles and MTP joints but no synovitis noted.     She was started on Plaquenil in 12/2021 and it helps little. Her repeat autoimmune serologies in 7/2022 showed elevated dsDNA of 18, normal C3/C4, AYAN, CBC, CMP. Due to worsening symptoms she was started on Methotrexate. She is on 6 tab of Methotrexate once a week for 7 weeks and has noticed improvement in her symptoms.  Denies significant side effects with methotrexate use.    She needs updated methotrexate monitoring labs.  Since she has noticed improvement I will continue methotrexate 15 mg once a week.  She will continue hydroxychloroquine 200 mg BID.    We will follow-up with her in 3 months with repeat labs.    Her SLEDAI score is 12 and PGA score of 4.       Seizure like episodes : Seen by Neurology, and EEG is normal. Her seizures are not related to Epilepsy.  She uses as needed Benadryl for seizure-like episodes and it helps.    Plan    Continue Methotrexate 6 tab once a week     Plaquenil 200 mg BID     Blood tests today     Follow up in  3 months with labs    -- Orders placed this encounter  Orders Placed This Encounter   Procedures     DNA ANTIBODY, NATV/2 STRAND     Complement C3     Complement C4       TT 30 min was spent on date of the encounter doing chart review, history and exam, documentation and further activities as noted above. Any prior notes, outside records, laboratory results, and imaging studies were reviewed if relevant.    Patient verbalized agreement with and understanding of the rationale for the diagnosis and treatment plan.  All questions were answered to best of my ability and the patient's satisfaction.      Chart documentation done in part with Dragon Voice recognition Software. Although reviewed after completion, some word and grammatical error may remain.                  Active Problem List:     Patient Active Problem List    Diagnosis Date Noted     Systemic lupus erythematosus (H) 08/06/2022     Priority: Medium     Chest pain on breathing 11/24/2021     Priority: Medium     Fatigue 11/24/2021     Priority: Medium     Multiple joint pain 11/24/2021     Priority: Medium     Photosensitivity 11/24/2021     Priority: Medium            History of Present Illness:   Dasia Nguyen is a 36 year old female with PMH of facial rash, joint pains, chest pain, +dsdna seen in the clinic in consultation at request of  for evaluation of autoimmune connective tissue disease.     She was in her usual state of health up until 3 years ago when she started noticing fatigue, joint pains, on and off chest pains. Her symptoms came as flare ups when all her symptoms would increase many folds. Her last severe flare was summer 2021 She had severe body aches, joint pains and could not get out of bed for couple weeks. In addition she was extremely fatigued and had headaches, dizziness, chest pains and oral sores. She also noticed being very photosensitive and developed facial rash. She saw dermatologist at [facility] who  diagnosed her with acute cutaneous lupus.     Beginning May 2021, she started having seizures like episodes at night. She describes it as hot tingling sensation and her body will shake for 15 - 20 sec. She has fallen out of bed during those episodes. She was seen by Neurologist and had EEG which did not show epilepsy. MRI head, C-spine, T-spine did not show demyelinated lesions. She was referred to cardiology to make sure that her symptoms are not related to Arrhythmias. Echocardiogram was normal. CT chest showed some calcification in the region of LAD.     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 Her ankles will hurt. In the morning she does not have strength to grasp things. She gets pain in her wrists, elbows. She has fatigue and feel she has flu every day. She has headaches every day. She takes naps during the day. Her chest hurt, feel as if someone is stepping on it. She always take shallow breaths. She was given medrol dos russ and responded well to it.     She has herniated discs in her back and always had chronic low back pain.    She gets recurrent UTI and possibly had kidney stones.     Autoimmune work up showed Neg AYAN, KIANNA, RF, ACPA but positive dsDNA. She had mildly elevated ESR of 26 in 6/2021.   Denies any raynauds, sicca symptoms, recurrent sinusitis/rhinitis, swallowing difficulty, hearing or visual changes recently. No h/o arterial/venous thrombosis in the past. Hx of one 5 week pregnancy loss.      July 6, 2022 - Plaquenil has helped with her facial rash. 2 months ago she started having seizures. When she is on flare up she gets seizures 5 hours straight. Whole body is so tired, she is sleeping 13 hours a day. She has had EEG which was normal. She was told to take Benadryl when it will happen.  She is shaking, she does not have control, hitting herself. She has overall feeling of being sick, can not get out of bed.  She has 5 kids. She has feeling of being sick all the  time. She has pain in her joints. She gets pain in her thumb joints, pinky, big toes. She has noticed that when she is very tired she gets flutters in her chest. She gets oral sores, big ones on the inner lip, right nostril. She has tried prednisone and felt that it helped with inflammation, joint aches. Every couple months she gets very sick. When she is walking a lot she gets swelling and puffiness in her ankles. She takes Ibuprofen as needed.     September 8, 2022 - She has not had facial rash and oral sores in a while. She feel that her fingers are not hurting. She feel that her chest is very tight. She is on Methotrexate 6 tab once a week for 7 weeks. She is on Plaquenil 200 mg twice daily. She is off of prednisone. She still get seizure like symptoms and use Benadryl as needed for these symptoms. She gets numbness in her arms when she lie down.          Review of Systems:     Review Of Systems  Constitutional: denies fever, chills, night sweats and weight loss.  Skin: + skin rash.  Eyes: No dryness or irritation in eyes. No episode of eye inflammation or redness.   Ears/Nose/Throat: no recurrent sinus infections.  Respiratory: No shortness of breath, dyspnea on exertion, cough, or hemoptysis  Cardiovascular: + chest pain or palpitations.  Gastrointestinal: no nausea, vomiting, abdominal pain.  Normal bowel movements.  Genitourinary: no dysuria, frequency  or hematuria.  Musculoskeletal: as in HPI  Neurologic: no numbness, tingling.  Psychiatric: no mood disorders.  Hematologic/Lymphatic/Immunologic: no history of easy bruising, petechia or purpura.  No abnormal bleeding.   Endocrine: no h/o thyroid disease or Diabetes.                  Past Medical History:     Past Medical History:   Diagnosis Date     Chest pain on breathing      Fatigue      Multiple joint pain      Photosensitivity      Past Surgical History:   Procedure Laterality Date     NO HISTORY OF SURGERY              Social History:     Social  History     Occupational History     Not on file   Tobacco Use     Smoking status: Never Smoker     Smokeless tobacco: Never Used   Substance and Sexual Activity     Alcohol use: Not Currently     Drug use: Never     Sexual activity: Not on file            Family History:     Family History   Problem Relation Age of Onset     Lupus Maternal Uncle      Sarcoidosis Maternal Aunt             Allergies:     Allergies   Allergen Reactions     Other Environmental Allergy      Cat dander            Medications:     Current Outpatient Medications   Medication Sig Dispense Refill     diphenhydrAMINE (BENADRYL) 25 MG tablet Take 2-4 tablets by mouth As needed for seizure       folic acid (FOLVITE) 1 MG tablet Take 1 tablet (1 mg) by mouth daily 90 tablet 3     hydroxychloroquine (PLAQUENIL) 200 MG tablet Take 1 tablet (200 mg) by mouth 2 times daily 180 tablet 1     magic mouthwash (ENTER INGREDIENTS IN COMMENTS) suspension Guafenesin - 70cc  2% viscous lidocaine - 30cc  Diphenhydramine (12.5/5cc) - 200cc  Nystatin - 30 cc  Sucralfate - 100cc  Maalox - 50cc  Disp: 480 cc(16 oz)  Sig: Use 3 tsp.(15cc), t.i.d. Swish for 3 mins, gargle and expectorate for 2 weeks. Then use prn for maintenance. 480 mL 1     methotrexate 2.5 MG tablet TAKE 4 TABLETS BY MOUTH 1 TIME A WEEK. INCREASE BY 1 TABLET EVERY WEEK TO REACH 6 TABLETS 1 TIME A WEEK DOSE 72 tablet 0     Multiple Vitamin (MULTIVITAMINS PO)        calcipotriene (DOVONOX) 0.005 % external ointment APPLY TOPICALLY TO FACE AS NEEDED RASH (Patient not taking: Reported on 9/8/2022)              Physical Exam:   Blood pressure 102/68, pulse 79, weight 88 kg (194 lb), SpO2 97 %.  Wt Readings from Last 4 Encounters:   09/08/22 88 kg (194 lb)   07/06/22 88 kg (194 lb)   11/24/21 87.1 kg (192 lb)       Constitutional: obesity, appearing stated age; cooperative  Eyes: nl EOM, PERRLA, vision, conjunctiva, sclera  ENT: nl external ears, nose, hearing, lips, teeth, gums, throat  No mucous  membrane lesions, normal saliva pool  Neck: no mass or thyroid enlargement  Resp: lungs clear to auscultation, nl to palpation  CV: RRR, no murmurs, rubs or gallops, no edema  GI: no ABD mass or tenderness, no HSM  : not tested  Lymph: no cervical, supraclavicular, inguinal or epitrochlear nodes    MS: All TMJ, neck, shoulder, elbow, wrist, MCP/PIP/DIP, spine, hip, knee, ankle, and foot MTP/IP joints were examined.   -- Tenderness over MCP, PIP joints, b/l wrists, elbows, ankles resolved. no synovitis noted. Full ROM.  Normal  strength.   -- No dactylitis,  tenosynovitis, enthesopathy.    Skin: no nail pitting, alopecia, rash, nodules or lesions  Neuro: nl cranial nerves, strength, sensation, DTRs.   Psych: nl judgement, orientation, memory, affect.         Data:     Results for orders placed or performed in visit on 09/08/22   CBC with platelets     Status: Normal   Result Value Ref Range    WBC Count 6.0 4.0 - 11.0 10e3/uL    RBC Count 4.53 3.80 - 5.20 10e6/uL    Hemoglobin 13.2 11.7 - 15.7 g/dL    Hematocrit 39.9 35.0 - 47.0 %    MCV 88 78 - 100 fL    MCH 29.1 26.5 - 33.0 pg    MCHC 33.1 31.5 - 36.5 g/dL    RDW 13.3 10.0 - 15.0 %    Platelet Count 359 150 - 450 10e3/uL   Erythrocyte sedimentation rate auto     Status: Normal   Result Value Ref Range    Erythrocyte Sedimentation Rate 18 0 - 20 mm/hr       Hemoglobin   Date Value Ref Range Status   09/08/2022 13.2 11.7 - 15.7 g/dL Final   07/06/2022 13.6 11.7 - 15.7 g/dL Final   11/24/2021 14.1 11.7 - 15.7 g/dL Final     Urea Nitrogen   Date Value Ref Range Status   07/06/2022 12 7 - 30 mg/dL Final   11/24/2021 13 7 - 30 mg/dL Final     Erythrocyte Sedimentation Rate   Date Value Ref Range Status   09/08/2022 18 0 - 20 mm/hr Final   11/24/2021 18 0 - 20 mm/hr Final     CRP Inflammation   Date Value Ref Range Status   11/24/2021 3.1 0.0 - 8.0 mg/L Final     AST   Date Value Ref Range Status   07/06/2022 10 0 - 45 U/L Final   11/24/2021 18 0 - 45 U/L Final      Albumin   Date Value Ref Range Status   07/06/2022 3.8 3.4 - 5.0 g/dL Final   11/24/2021 3.9 3.4 - 5.0 g/dL Final     Alkaline Phosphatase   Date Value Ref Range Status   07/06/2022 89 40 - 150 U/L Final   11/24/2021 106 40 - 150 U/L Final     ALT   Date Value Ref Range Status   07/06/2022 22 0 - 50 U/L Final   11/24/2021 33 0 - 50 U/L Final     Rheumatoid Factor   Date Value Ref Range Status   11/24/2021 <7 <12 IU/mL Final     Recent Labs   Lab Test 09/08/22  1111 07/06/22  1153 11/24/21  1239   WBC 6.0 6.2 7.1   HGB 13.2 13.6 14.1   HCT 39.9 40.4 42.3   MCV 88 85 87    314 395   BUN  --  12 13   AST  --  10 18   ALT  --  22 33   ALKPHOS  --  89 106        [facility]: [facility]     Reviewed Rheumatology lab flowsheet    Felicita Herrera MD  [facility]  Department of Rheumatology & Autoimmune Disorders  [facility]: [phone]   Pager - [phone]

## 2022-09-09 ENCOUNTER — MYC MEDICAL ADVICE (OUTPATIENT)
Dept: RHEUMATOLOGY | Facility: CLINIC | Age: 37
End: 2022-09-09

## 2022-09-09 DIAGNOSIS — R79.89 ELEVATED LFTS: ICD-10-CM

## 2022-09-09 DIAGNOSIS — M32.9 SYSTEMIC LUPUS ERYTHEMATOSUS, UNSPECIFIED SLE TYPE, UNSPECIFIED ORGAN INVOLVEMENT STATUS (H): Primary | ICD-10-CM

## 2022-09-09 LAB
ALBUMIN SERPL-MCNC: 4.1 G/DL (ref 3.4–5)
ALT SERPL W P-5'-P-CCNC: 61 U/L (ref 0–50)
AST SERPL W P-5'-P-CCNC: 20 U/L (ref 0–45)
C3 SERPL-MCNC: 144 MG/DL (ref 81–157)
C4 SERPL-MCNC: 41 MG/DL (ref 13–39)
CREAT SERPL-MCNC: 0.74 MG/DL (ref 0.52–1.04)
DSDNA AB SER-ACNC: 18 IU/ML
GFR SERPL CREATININE-BSD FRML MDRD: >90 ML/MIN/1.73M2

## 2022-09-09 NOTE — RESULT ENCOUNTER NOTE
Arlyn Rossi,    One of the liver enzyme ALT is elevated which could be related to Methotrexate use. You can reduce it to 4 tab once a week, increase folic acid to 2 - 3 mg daily. Will repeat liver tests in 2 weeks.  dsDNA is still elevated and may be early to see the change yet. Having elevated C4 is not concerning, it is normal variant.

## 2022-09-18 ENCOUNTER — HEALTH MAINTENANCE LETTER (OUTPATIENT)
Age: 37
End: 2022-09-18

## 2022-09-29 DIAGNOSIS — L93.2 CUTANEOUS LUPUS ERYTHEMATOSUS: ICD-10-CM

## 2022-09-29 DIAGNOSIS — R76.8 POSITIVE DOUBLE STRANDED DNA ANTIBODY TEST: ICD-10-CM

## 2022-10-04 RX ORDER — HYDROXYCHLOROQUINE SULFATE 200 MG/1
TABLET, FILM COATED ORAL
Qty: 60 TABLET | OUTPATIENT
Start: 2022-10-04

## 2022-11-04 ENCOUNTER — MYC MEDICAL ADVICE (OUTPATIENT)
Dept: RHEUMATOLOGY | Facility: CLINIC | Age: 37
End: 2022-11-04

## 2022-11-04 DIAGNOSIS — M19.90 INFLAMMATORY ARTHRITIS: ICD-10-CM

## 2022-11-04 DIAGNOSIS — M32.9 SYSTEMIC LUPUS ERYTHEMATOSUS, UNSPECIFIED SLE TYPE, UNSPECIFIED ORGAN INVOLVEMENT STATUS (H): Primary | ICD-10-CM

## 2022-11-07 NOTE — TELEPHONE ENCOUNTER
Called patient in response to her [employer] message from 11/4/22. Patient states she decreased her methotrexate from 6 to 4 tablets on 9/8/22 d/t elevated ALT.  Since the decrease she has had increased joint pain elissa in the hands, fingers, and knees.  About a month ago she stopped the methotrexate all together as the 4 tablets were not helping with pain. States she had diffuse all over body pain this last Saturday and spent all day on the couch.  Mentions her quality of life as suffered due to pain.  ALT completed on 9/8 was 61.  Had it rechecked by her PCP on 9/27 and was 49.  See Care Everywhere. Patient also states she had a CT scan done 11/5 and fatty liver was found.  She wanted you to be aware.  Patient is wondering what her options are? Next appt with you is 12/21/22. Please advise.     Dang Lopez RN

## 2022-11-07 NOTE — TELEPHONE ENCOUNTER
Pt is returning Dang's call back; writer unable to get a hold of Dang.    Please contact the Pt back; ok to leave a detailed message if it goes to a voicemail.

## 2022-11-11 ENCOUNTER — TELEPHONE (OUTPATIENT)
Dept: RHEUMATOLOGY | Facility: CLINIC | Age: 37
End: 2022-11-11

## 2022-11-11 NOTE — TELEPHONE ENCOUNTER
Patient called regarding MyChart message sent from Dr Herrera regarding azathioprine 11/11.  Patient states she is not comfortable with this medication as she has already had skin cancer and does want to take any further risk.  She is wondering about cellcept or benlysta?  Please advise.     Dang Lopez RN

## 2022-11-12 NOTE — TELEPHONE ENCOUNTER
Cellcept and benlysta are good options for lupus patients. Cellcept is not very effective for joints pains but benlysta is. These medications are also immunosuppressive. In general use of immune suppressive medications increase risk of skin cancers and lymphoma.

## 2022-11-15 NOTE — TELEPHONE ENCOUNTER
Patient called and aware of message.  She states she would like to try Orencia.  Writer sent message to provider on 11/14/22.  Will await response.     Dang Lopez RN

## 2022-11-16 ENCOUNTER — TELEPHONE (OUTPATIENT)
Dept: RHEUMATOLOGY | Facility: CLINIC | Age: 37
End: 2022-11-16

## 2022-11-16 DIAGNOSIS — L93.2 CUTANEOUS LUPUS ERYTHEMATOSUS: Primary | ICD-10-CM

## 2022-11-16 DIAGNOSIS — M19.90 INFLAMMATORY ARTHRITIS: ICD-10-CM

## 2022-11-16 DIAGNOSIS — M32.9 SYSTEMIC LUPUS ERYTHEMATOSUS, UNSPECIFIED SLE TYPE, UNSPECIFIED ORGAN INVOLVEMENT STATUS (H): ICD-10-CM

## 2022-11-16 NOTE — TELEPHONE ENCOUNTER
Call back received from pt-she reports that she has been on the phone with her insurance for the last hour or so, and that they have informed her that the PA for the Orencia has been denied.   They also informed her that she CAN get Benlysta under her plan, although this will also require a prior authorization.     Patient is hoping to speak to Dr. Hernandez's nurse Joe about her available options.     To speak with her insurance about their determination, please call the pharmacy helpline at [phone] (this is the number pt was given for her doctor's office to call if needed).     Routed to  RHEUMATOLOGY PATIENT CARE POOL, JOE QUIROZ RN

## 2022-11-16 NOTE — TELEPHONE ENCOUNTER
Patient calling in regards to Orencia RX sent to pharmacy.  She states RX requires a prior auth.  Prior auth has been initiated.  See previous message sent today. Will await response.     Dang Lopez RN

## 2022-11-16 NOTE — TELEPHONE ENCOUNTER
Patient called and aware Armando was called to  pharmacy.  Instructed to call back if any questions/concerns should arise.     Dang Lopez RN

## 2022-11-16 NOTE — TELEPHONE ENCOUNTER
Patient states Orencia subcutaneous is not covered by her insurance and the PA has been denied.  They have informed her that Benlysta is covered.  Please indicate dose and pre meds and I can write the therapy plan for you to sign.  I will send request to prior auth as well. Thanks!    Dang Lopez RN

## 2022-11-16 NOTE — TELEPHONE ENCOUNTER
See other tel enc from today (11/16/22)-pt reports her insurance informed her PA for Orencia has been denied.

## 2022-11-16 NOTE — TELEPHONE ENCOUNTER
Prior Authorization Infusion/Clinic Administered Request    Location: [facility]  Diagnosis and ICD:Systemic lupus erythematosus, M32.9  Drug/Therapy: Benlysta IV    Previously Tried and Failed Therapies: unknown    Date of provider note with supporting information: 9/8/22    Urgency (When is the patient scheduled?): not scheduled yet, waiting on insurance approval    Would you like to include any research articles? no        If yes please call [phone] for further instructions about sending that information

## 2022-11-16 NOTE — TELEPHONE ENCOUNTER
PRIOR AUTHORIZATION DENIED    Medication: Orencia SQ    Denial Date:  11/16/2022    Denial Rational: not a covered DX    Appeal Information: no appeal already started pa on another med

## 2022-11-16 NOTE — TELEPHONE ENCOUNTER
PA Initiation    Medication: Orencia PA Pending  Insurance Company: [employer] - Phone [phone] Fax [phone]  Pharmacy Filling the Rx:    Filling Pharmacy Phone:    Filling Pharmacy Fax:    Start Date: 11/16/2022    Key: [id number]

## 2022-11-17 NOTE — TELEPHONE ENCOUNTER
Central Prior Authorization Team   Phone: [phone]      Hello,   If this is not a Liaison/PFA handled medication/department   please route Spec Med PA request to Kelsie Akbar for processing.   Thank you.

## 2022-11-18 RX ORDER — BELIMUMAB 200 MG/ML
200 SOLUTION SUBCUTANEOUS
Qty: 4 ML | Refills: 1 | Status: SHIPPED | OUTPATIENT
Start: 2022-11-18 | End: 2023-03-24

## 2022-11-18 NOTE — TELEPHONE ENCOUNTER
Prior Authorization Specialty Medication Request    Medication/Dose: Benlysta SQ  ICD code (if different than what is on RX):  M32.9  Previously Tried and Failed:  unknown    Insurance Name: [employer]  Insurance ID: [id number]  Insurance Phone Number: [phone]    Dang Lopez RN

## 2022-11-18 NOTE — TELEPHONE ENCOUNTER
Please note when completing prior auth.  Dr Herrera ordered subcutaneous dosing not IV.     Dang Lopez RN

## 2022-11-18 NOTE — TELEPHONE ENCOUNTER
Prior Authorization Approval    Authorization Effective Date: 11/18/2022  Authorization Expiration Date: 3/8/2023  Medication: Benlysta  Approved Dose/Quantity: weekly  Reference #: [id number]   Insurance Company: [employer] - Phone [phone] Fax [phone]  Expected CoPay:       CoPay Card Available:      [employer] Assistance Needed:    Which Pharmacy is filling the prescription (Not needed for infusion/clinic administered): [address]  Pharmacy Notified: Yes  Patient Notified: Yes

## 2022-11-21 ENCOUNTER — TELEPHONE (OUTPATIENT)
Dept: RHEUMATOLOGY | Facility: CLINIC | Age: 37
End: 2022-11-21

## 2022-11-21 DIAGNOSIS — M32.9 SYSTEMIC LUPUS ERYTHEMATOSUS, UNSPECIFIED SLE TYPE, UNSPECIFIED ORGAN INVOLVEMENT STATUS (H): Primary | ICD-10-CM

## 2022-11-21 NOTE — TELEPHONE ENCOUNTER
Patient calling stating her copay for the injectable Benlysta is too high.  Patient would like to start IV Benlysta as her insurance company has informed her it will be cheaper for her.  Please advise.     Dang Lopez RN

## 2022-11-21 NOTE — TELEPHONE ENCOUNTER
Enid would like a call back from [facility] to discuss Benlysta IV verse injections.  Patient thought Dr. Herrera was going to order IV because it's cheaper.

## 2022-11-22 RX ORDER — ALBUTEROL SULFATE 90 UG/1
1-2 AEROSOL, METERED RESPIRATORY (INHALATION)
Status: CANCELLED
Start: 2022-11-22

## 2022-11-22 RX ORDER — ALBUTEROL SULFATE 0.83 MG/ML
2.5 SOLUTION RESPIRATORY (INHALATION)
Status: CANCELLED | OUTPATIENT
Start: 2022-11-22

## 2022-11-22 RX ORDER — DIPHENHYDRAMINE HCL 25 MG
25 CAPSULE ORAL ONCE
Status: CANCELLED
Start: 2022-11-22

## 2022-11-22 RX ORDER — METHYLPREDNISOLONE SODIUM SUCCINATE 125 MG/2ML
125 INJECTION, POWDER, LYOPHILIZED, FOR SOLUTION INTRAMUSCULAR; INTRAVENOUS ONCE
Status: CANCELLED
Start: 2022-11-22

## 2022-11-22 RX ORDER — HEPARIN SODIUM,PORCINE 10 UNIT/ML
5 VIAL (ML) INTRAVENOUS
Status: CANCELLED | OUTPATIENT
Start: 2022-11-22

## 2022-11-22 RX ORDER — ACETAMINOPHEN 325 MG/1
650 TABLET ORAL ONCE
Status: CANCELLED
Start: 2022-11-22

## 2022-11-22 RX ORDER — METHYLPREDNISOLONE SODIUM SUCCINATE 125 MG/2ML
125 INJECTION, POWDER, LYOPHILIZED, FOR SOLUTION INTRAMUSCULAR; INTRAVENOUS
Status: CANCELLED
Start: 2022-11-22

## 2022-11-22 RX ORDER — HEPARIN SODIUM (PORCINE) LOCK FLUSH IV SOLN 100 UNIT/ML 100 UNIT/ML
5 SOLUTION INTRAVENOUS
Status: CANCELLED | OUTPATIENT
Start: 2022-11-22

## 2022-11-22 RX ORDER — DIPHENHYDRAMINE HYDROCHLORIDE 50 MG/ML
50 INJECTION INTRAMUSCULAR; INTRAVENOUS
Status: CANCELLED
Start: 2022-11-22

## 2022-11-22 RX ORDER — EPINEPHRINE 1 MG/ML
0.3 INJECTION, SOLUTION, CONCENTRATE INTRAVENOUS EVERY 5 MIN PRN
Status: CANCELLED | OUTPATIENT
Start: 2022-11-22

## 2022-11-22 RX ORDER — MEPERIDINE HYDROCHLORIDE 25 MG/ML
25 INJECTION INTRAMUSCULAR; INTRAVENOUS; SUBCUTANEOUS EVERY 30 MIN PRN
Status: CANCELLED | OUTPATIENT
Start: 2022-11-22

## 2022-11-23 ENCOUNTER — TELEPHONE (OUTPATIENT)
Dept: RHEUMATOLOGY | Facility: CLINIC | Age: 37
End: 2022-11-23

## 2022-11-23 ENCOUNTER — MYC MEDICAL ADVICE (OUTPATIENT)
Dept: RHEUMATOLOGY | Facility: CLINIC | Age: 37
End: 2022-11-23

## 2022-11-23 NOTE — TELEPHONE ENCOUNTER
Patient called and aware IV Benlysta treatment plan is signed.  She would like to infuse at [facility].  Phone number given to schedule.    Dang Lopez RN

## 2022-11-23 NOTE — TELEPHONE ENCOUNTER
See patient MyChart message sent today.  She states her Medical Assistance will cover some the remaining costs of the Benlysta infusion, but they require a prior auth.      .Prior Authorization Infusion/Clinic Administered Request    Location: [facility]  Diagnosis and ICD:M32.9  Drug/Therapy: Benlysta    Her first infusion is scheduled for Dec 14th.     Dang Lopez RN

## 2022-12-09 NOTE — TELEPHONE ENCOUNTER
Pt is calling back to follow up on the status of the PA; Pt is a bit concerned for she has not heard anyone call her back and her infusion appt is scheduled for next Wednesday, 12/14/22.    Please reach back out to the Pt to let her know. Ok to leave a detailed message if it goes to a voicemail.

## 2022-12-09 NOTE — TELEPHONE ENCOUNTER
"Patient sent an update with the following information.  See MyChart message from today.    Per PA department:     \"We have not heard back from [place] Medicaid on if this has been approved yet. However, they are usually pretty quick with their turn around time and would expect to know before her appt. It does look to meet policy and as been given the ok to proceed with. We also have it marked for us to call and check the status of the PA on Monday. We will update you with the final outcome.     Thanks,   Gely Raphael\"     Dang Lopez RN    "

## 2022-12-12 ENCOUNTER — INFUSION THERAPY VISIT (OUTPATIENT)
Dept: INFUSION THERAPY | Facility: CLINIC | Age: 37
End: 2022-12-12
Attending: STUDENT IN AN ORGANIZED HEALTH CARE EDUCATION/TRAINING PROGRAM
Payer: COMMERCIAL

## 2022-12-12 VITALS
HEART RATE: 80 BPM | WEIGHT: 203.9 LBS | OXYGEN SATURATION: 100 % | SYSTOLIC BLOOD PRESSURE: 128 MMHG | BODY MASS INDEX: 31.94 KG/M2 | DIASTOLIC BLOOD PRESSURE: 73 MMHG | TEMPERATURE: 98.3 F | RESPIRATION RATE: 16 BRPM

## 2022-12-12 DIAGNOSIS — M32.9 SYSTEMIC LUPUS ERYTHEMATOSUS, UNSPECIFIED SLE TYPE, UNSPECIFIED ORGAN INVOLVEMENT STATUS (H): Primary | ICD-10-CM

## 2022-12-12 PROCEDURE — 96375 TX/PRO/DX INJ NEW DRUG ADDON: CPT | Performed by: NURSE PRACTITIONER

## 2022-12-12 PROCEDURE — 99207 PR NO CHARGE LOS: CPT

## 2022-12-12 PROCEDURE — 96413 CHEMO IV INFUSION 1 HR: CPT | Performed by: NURSE PRACTITIONER

## 2022-12-12 RX ORDER — DIPHENHYDRAMINE HYDROCHLORIDE 50 MG/ML
50 INJECTION INTRAMUSCULAR; INTRAVENOUS
Status: CANCELLED
Start: 2022-12-26

## 2022-12-12 RX ORDER — HEPARIN SODIUM,PORCINE 10 UNIT/ML
5 VIAL (ML) INTRAVENOUS
Status: CANCELLED | OUTPATIENT
Start: 2022-12-26

## 2022-12-12 RX ORDER — METHYLPREDNISOLONE SODIUM SUCCINATE 125 MG/2ML
125 INJECTION, POWDER, LYOPHILIZED, FOR SOLUTION INTRAMUSCULAR; INTRAVENOUS ONCE
Status: COMPLETED | OUTPATIENT
Start: 2022-12-12 | End: 2022-12-12

## 2022-12-12 RX ORDER — METHYLPREDNISOLONE SODIUM SUCCINATE 125 MG/2ML
125 INJECTION, POWDER, LYOPHILIZED, FOR SOLUTION INTRAMUSCULAR; INTRAVENOUS ONCE
Status: CANCELLED
Start: 2022-12-26

## 2022-12-12 RX ORDER — EPINEPHRINE 1 MG/ML
0.3 INJECTION, SOLUTION INTRAMUSCULAR; SUBCUTANEOUS EVERY 5 MIN PRN
Status: CANCELLED | OUTPATIENT
Start: 2022-12-26

## 2022-12-12 RX ORDER — HEPARIN SODIUM (PORCINE) LOCK FLUSH IV SOLN 100 UNIT/ML 100 UNIT/ML
5 SOLUTION INTRAVENOUS
Status: CANCELLED | OUTPATIENT
Start: 2022-12-26

## 2022-12-12 RX ORDER — METHYLPREDNISOLONE SODIUM SUCCINATE 125 MG/2ML
125 INJECTION, POWDER, LYOPHILIZED, FOR SOLUTION INTRAMUSCULAR; INTRAVENOUS
Status: CANCELLED
Start: 2022-12-26

## 2022-12-12 RX ORDER — ACETAMINOPHEN 325 MG/1
650 TABLET ORAL ONCE
Status: CANCELLED
Start: 2022-12-26

## 2022-12-12 RX ORDER — ALBUTEROL SULFATE 90 UG/1
1-2 AEROSOL, METERED RESPIRATORY (INHALATION)
Status: CANCELLED
Start: 2022-12-26

## 2022-12-12 RX ORDER — ALBUTEROL SULFATE 0.83 MG/ML
2.5 SOLUTION RESPIRATORY (INHALATION)
Status: CANCELLED | OUTPATIENT
Start: 2022-12-26

## 2022-12-12 RX ORDER — ACETAMINOPHEN 325 MG/1
650 TABLET ORAL ONCE
Status: COMPLETED | OUTPATIENT
Start: 2022-12-12 | End: 2022-12-12

## 2022-12-12 RX ORDER — DIPHENHYDRAMINE HCL 25 MG
25 CAPSULE ORAL ONCE
Status: CANCELLED
Start: 2022-12-26

## 2022-12-12 RX ORDER — MEPERIDINE HYDROCHLORIDE 25 MG/ML
25 INJECTION INTRAMUSCULAR; INTRAVENOUS; SUBCUTANEOUS EVERY 30 MIN PRN
Status: CANCELLED | OUTPATIENT
Start: 2022-12-26

## 2022-12-12 RX ADMIN — METHYLPREDNISOLONE SODIUM SUCCINATE 125 MG: 125 INJECTION INTRAMUSCULAR; INTRAVENOUS at 09:05

## 2022-12-12 RX ADMIN — ACETAMINOPHEN 650 MG: 325 TABLET ORAL at 08:58

## 2022-12-12 RX ADMIN — Medication 250 ML: at 08:59

## 2022-12-12 NOTE — PROGRESS NOTES
Infusion Nursing Note:  Dasia Nguyen presents today for Benlysta.    Patient seen by provider today: No   present during visit today: Not Applicable.    Note: Patient new to  [facility]fusion [facility], oriented to facility including call light use and restrooms. Written information on Benlysta provided and review, questions answered to patient satisfaction.    Intravenous Access:  Peripheral IV placed.    Treatment Conditions:  Biological Infusion Checklist:  ~~~ NOTE: If the patient answers yes to any of the questions below, hold the infusion and contact ordering provider or on-call provider.    1. Have you recently had an elevated temperature, fever, chills, productive cough, coughing for 3 weeks or longer or hemoptysis, abnormal vital signs, night sweats,  chest pain or have you noticed a decrease in your appetite, unexplained weight loss or fatigue? No  2. Do you have any open wounds or new incisions? No  3. Do you have any recent or upcoming hospitalizations, surgeries or dental procedures? No  4. Do you currently have or recently have had any signs of illness or infection or are you on any antibiotics? No  5. Have you had any new, sudden or worsening abdominal pain? No  6. Have you or anyone in your household received a live vaccination in the past 4 weeks? Please note:  No live vaccines while on biologic/chemotherapy until 6 months after the last treatment.  Patient can receive the flu vaccine (shot only) and the pneumovax.  It is optimal for the patient to get these vaccines mid cycle, but they can be given at any time as long as it is not on the day of the infusion. No  7. Have you recently been diagnosed with any new nervous system diseases (ie. Multiple sclerosis, Guillain [place], seizures, neurological changes) or cancer diagnosis? No  8. Are you on any form of radiation or chemotherapy? No  9. Are you pregnant or breast feeding or do you have plans of pregnancy in the future? No  10. Have you been  having any signs of worsening depression or suicidal ideations?  (benlysta only) No  11. Have there been any other new onset medical symptoms? No      Post Infusion Assessment:  Patient tolerated infusion without incident.  Site patent and intact, free from redness, edema or discomfort.  No evidence of extravasations.  Access discontinued per protocol.  Biologic Infusion Post Education: Call the triage nurse at your clinic or seek medical attention if you have chills and/or temperature greater than or equal to 100.5, uncontrolled nausea/vomiting, diarrhea, constipation, dizziness, shortness of breath, chest pain, heart palpitations, weakness or any other new or concerning symptoms, questions or concerns.  You cannot have any live virus vaccines prior to or during treatment or up to 6 months post infusion.  If you have an upcoming surgery, medical procedure or dental procedure during treatment, this should be discussed with your ordering physician and your surgeon/dentist.  If you are having any concerning symptom, if you are unsure if you should get your next infusion or wish to speak to a provider before your next infusion, please call your care coordinator or triage nurse at your clinic to notify them so we can adequately serve you.     Discharge Plan:   Future appts have been reviewed and crosschecked with appt note and plan.  AVS to patient via [email or web address].  Patient will return 12/28/2022 for next appointment.   Patient discharged in stable condition accompanied by: self.  Departure Mode: Ambulatory.      Alyson Barnes RN

## 2022-12-23 ENCOUNTER — TELEPHONE (OUTPATIENT)
Dept: RHEUMATOLOGY | Facility: CLINIC | Age: 37
End: 2022-12-23

## 2022-12-23 NOTE — TELEPHONE ENCOUNTER
Patient calling stating the prior auth from her Medical Assistance to cover the costs from her Benlysta infusions will need to be resubmitted 1/1/23.  She received a letter in the mail from them stating as such.     Dang Lopez RN

## 2022-12-26 ENCOUNTER — INFUSION THERAPY VISIT (OUTPATIENT)
Dept: INFUSION THERAPY | Facility: CLINIC | Age: 37
End: 2022-12-26
Payer: COMMERCIAL

## 2022-12-26 VITALS
DIASTOLIC BLOOD PRESSURE: 81 MMHG | BODY MASS INDEX: 31.67 KG/M2 | SYSTOLIC BLOOD PRESSURE: 130 MMHG | OXYGEN SATURATION: 96 % | TEMPERATURE: 98.4 F | WEIGHT: 202.2 LBS | RESPIRATION RATE: 16 BRPM | HEART RATE: 86 BPM

## 2022-12-26 DIAGNOSIS — M32.9 SYSTEMIC LUPUS ERYTHEMATOSUS, UNSPECIFIED SLE TYPE, UNSPECIFIED ORGAN INVOLVEMENT STATUS (H): Primary | ICD-10-CM

## 2022-12-26 PROCEDURE — 96367 TX/PROPH/DG ADDL SEQ IV INF: CPT | Performed by: INTERNAL MEDICINE

## 2022-12-26 PROCEDURE — 99207 PR NO CHARGE LOS: CPT

## 2022-12-26 PROCEDURE — 96413 CHEMO IV INFUSION 1 HR: CPT | Performed by: INTERNAL MEDICINE

## 2022-12-26 RX ORDER — HEPARIN SODIUM (PORCINE) LOCK FLUSH IV SOLN 100 UNIT/ML 100 UNIT/ML
5 SOLUTION INTRAVENOUS
Status: CANCELLED | OUTPATIENT
Start: 2023-01-09

## 2022-12-26 RX ORDER — EPINEPHRINE 1 MG/ML
0.3 INJECTION, SOLUTION INTRAMUSCULAR; SUBCUTANEOUS EVERY 5 MIN PRN
Status: CANCELLED | OUTPATIENT
Start: 2023-01-09

## 2022-12-26 RX ORDER — METHYLPREDNISOLONE SODIUM SUCCINATE 125 MG/2ML
125 INJECTION, POWDER, LYOPHILIZED, FOR SOLUTION INTRAMUSCULAR; INTRAVENOUS
Status: CANCELLED
Start: 2023-01-09

## 2022-12-26 RX ORDER — HEPARIN SODIUM,PORCINE 10 UNIT/ML
5 VIAL (ML) INTRAVENOUS
Status: CANCELLED | OUTPATIENT
Start: 2023-01-09

## 2022-12-26 RX ORDER — DIPHENHYDRAMINE HCL 25 MG
25 CAPSULE ORAL ONCE
Status: CANCELLED
Start: 2023-01-09

## 2022-12-26 RX ORDER — ACETAMINOPHEN 325 MG/1
650 TABLET ORAL ONCE
Status: COMPLETED | OUTPATIENT
Start: 2022-12-26 | End: 2022-12-26

## 2022-12-26 RX ORDER — ALBUTEROL SULFATE 0.83 MG/ML
2.5 SOLUTION RESPIRATORY (INHALATION)
Status: CANCELLED | OUTPATIENT
Start: 2023-01-09

## 2022-12-26 RX ORDER — ACETAMINOPHEN 325 MG/1
650 TABLET ORAL ONCE
Status: CANCELLED
Start: 2023-01-09

## 2022-12-26 RX ORDER — ALBUTEROL SULFATE 90 UG/1
1-2 AEROSOL, METERED RESPIRATORY (INHALATION)
Status: CANCELLED
Start: 2023-01-09

## 2022-12-26 RX ORDER — MEPERIDINE HYDROCHLORIDE 25 MG/ML
25 INJECTION INTRAMUSCULAR; INTRAVENOUS; SUBCUTANEOUS EVERY 30 MIN PRN
Status: CANCELLED | OUTPATIENT
Start: 2023-01-09

## 2022-12-26 RX ORDER — DIPHENHYDRAMINE HYDROCHLORIDE 50 MG/ML
50 INJECTION INTRAMUSCULAR; INTRAVENOUS
Status: CANCELLED
Start: 2023-01-09

## 2022-12-26 RX ADMIN — ACETAMINOPHEN 650 MG: 325 TABLET ORAL at 15:25

## 2022-12-26 RX ADMIN — Medication 250 ML: at 15:26

## 2022-12-26 NOTE — PROGRESS NOTES
Infusion Nursing Note:  Dasia Nguyen presents today for Benlysta.    Patient seen by provider today: No   present during visit today: Not Applicable.    Note: States she tolerated her first dose of Benlysta well. Discussed oral vs. IV benadryl- patient stated she wanted IV benadryl due to it working better for her. Per patient she has to take a large dose of oral benadryl when she feels of seizure coming.     Intravenous Access:  Peripheral IV placed.    Treatment Conditions:  Biological Infusion Checklist:  ~~~ NOTE: If the patient answers yes to any of the questions below, hold the infusion and contact ordering provider or on-call provider.    1. Have you recently had an elevated temperature, fever, chills, productive cough, coughing for 3 weeks or longer or hemoptysis, abnormal vital signs, night sweats,  chest pain or have you noticed a decrease in your appetite, unexplained weight loss or fatigue? No  2. Do you have any open wounds or new incisions? No  3. Do you have any recent or upcoming hospitalizations, surgeries or dental procedures? No  4. Do you currently have or recently have had any signs of illness or infection or are you on any antibiotics? No  5. Have you had any new, sudden or worsening abdominal pain? No  6. Have you or anyone in your household received a live vaccination in the past 4 weeks? Please note:  No live vaccines while on biologic/chemotherapy until 6 months after the last treatment.  Patient can receive the flu vaccine (shot only) and the pneumovax.  It is optimal for the patient to get these vaccines mid cycle, but they can be given at any time as long as it is not on the day of the infusion. No  7. Have you recently been diagnosed with any new nervous system diseases (ie. Multiple sclerosis, Guillain [place], seizures, neurological changes) or cancer diagnosis? No  8. Are you on any form of radiation or chemotherapy? No  9. Are you pregnant or breast feeding or do you  have plans of pregnancy in the future? No  10. Have there been any other new onset medical symptoms? No    Post Infusion Assessment:  Patient tolerated infusion without incident.  Site patent and intact, free from redness, edema or discomfort.  No evidence of extravasations.  Access discontinued per protocol.  Biologic Infusion Post Education: Call the triage nurse at your clinic or seek medical attention if you have chills and/or temperature greater than or equal to 100.5, uncontrolled nausea/vomiting, diarrhea, constipation, dizziness, shortness of breath, chest pain, heart palpitations, weakness or any other new or concerning symptoms, questions or concerns.  You cannot have any live virus vaccines prior to or during treatment or up to 6 months post infusion.  If you have an upcoming surgery, medical procedure or dental procedure during treatment, this should be discussed with your ordering physician and your surgeon/dentist.  If you are having any concerning symptom, if you are unsure if you should get your next infusion or wish to speak to a provider before your next infusion, please call your care coordinator or triage nurse at your clinic to notify them so we can adequately serve you.     Discharge Plan:   Discharge instructions reviewed with: Patient.  Patient and/or family verbalized understanding of discharge instructions and all questions answered.  Patient discharged in stable condition accompanied by: self.  Departure Mode: Ambulatory.      Amaya Maldonado RN

## 2023-01-13 ENCOUNTER — INFUSION THERAPY VISIT (OUTPATIENT)
Dept: INFUSION THERAPY | Facility: CLINIC | Age: 38
End: 2023-01-13
Payer: COMMERCIAL

## 2023-01-13 VITALS
SYSTOLIC BLOOD PRESSURE: 97 MMHG | TEMPERATURE: 98.5 F | BODY MASS INDEX: 31.81 KG/M2 | RESPIRATION RATE: 16 BRPM | WEIGHT: 203.1 LBS | OXYGEN SATURATION: 99 % | DIASTOLIC BLOOD PRESSURE: 56 MMHG | HEART RATE: 100 BPM

## 2023-01-13 DIAGNOSIS — M32.9 SYSTEMIC LUPUS ERYTHEMATOSUS, UNSPECIFIED SLE TYPE, UNSPECIFIED ORGAN INVOLVEMENT STATUS (H): Primary | ICD-10-CM

## 2023-01-13 PROCEDURE — 96367 TX/PROPH/DG ADDL SEQ IV INF: CPT | Performed by: INTERNAL MEDICINE

## 2023-01-13 PROCEDURE — 96413 CHEMO IV INFUSION 1 HR: CPT | Performed by: INTERNAL MEDICINE

## 2023-01-13 PROCEDURE — 99207 PR NO CHARGE LOS: CPT

## 2023-01-13 RX ORDER — METHYLPREDNISOLONE SODIUM SUCCINATE 125 MG/2ML
125 INJECTION, POWDER, LYOPHILIZED, FOR SOLUTION INTRAMUSCULAR; INTRAVENOUS
Status: CANCELLED
Start: 2023-01-23

## 2023-01-13 RX ORDER — HEPARIN SODIUM,PORCINE 10 UNIT/ML
5 VIAL (ML) INTRAVENOUS
Status: CANCELLED | OUTPATIENT
Start: 2023-01-23

## 2023-01-13 RX ORDER — DIPHENHYDRAMINE HYDROCHLORIDE 50 MG/ML
50 INJECTION INTRAMUSCULAR; INTRAVENOUS
Status: CANCELLED
Start: 2023-01-23

## 2023-01-13 RX ORDER — HEPARIN SODIUM (PORCINE) LOCK FLUSH IV SOLN 100 UNIT/ML 100 UNIT/ML
5 SOLUTION INTRAVENOUS
Status: CANCELLED | OUTPATIENT
Start: 2023-01-23

## 2023-01-13 RX ORDER — ACETAMINOPHEN 325 MG/1
650 TABLET ORAL ONCE
Status: COMPLETED | OUTPATIENT
Start: 2023-01-13 | End: 2023-01-13

## 2023-01-13 RX ORDER — ACETAMINOPHEN 325 MG/1
650 TABLET ORAL ONCE
Status: CANCELLED
Start: 2023-01-23

## 2023-01-13 RX ORDER — EPINEPHRINE 1 MG/ML
0.3 INJECTION, SOLUTION INTRAMUSCULAR; SUBCUTANEOUS EVERY 5 MIN PRN
Status: CANCELLED | OUTPATIENT
Start: 2023-01-23

## 2023-01-13 RX ORDER — ALBUTEROL SULFATE 90 UG/1
1-2 AEROSOL, METERED RESPIRATORY (INHALATION)
Status: CANCELLED
Start: 2023-01-23

## 2023-01-13 RX ORDER — DIPHENHYDRAMINE HCL 25 MG
25 CAPSULE ORAL ONCE
Status: CANCELLED
Start: 2023-01-23

## 2023-01-13 RX ORDER — MEPERIDINE HYDROCHLORIDE 25 MG/ML
25 INJECTION INTRAMUSCULAR; INTRAVENOUS; SUBCUTANEOUS EVERY 30 MIN PRN
Status: CANCELLED | OUTPATIENT
Start: 2023-01-23

## 2023-01-13 RX ORDER — ALBUTEROL SULFATE 0.83 MG/ML
2.5 SOLUTION RESPIRATORY (INHALATION)
Status: CANCELLED | OUTPATIENT
Start: 2023-01-23

## 2023-01-13 RX ADMIN — ACETAMINOPHEN 650 MG: 325 TABLET ORAL at 08:55

## 2023-01-13 RX ADMIN — Medication 250 ML: at 08:56

## 2023-01-13 ASSESSMENT — PAIN SCALES - GENERAL: PAINLEVEL: MODERATE PAIN (5)

## 2023-01-13 NOTE — PROGRESS NOTES
Infusion Nursing Note:  Dasia Nguyen presents today for Benlysta.    Patient seen by provider today: No   present during visit today: Not Applicable.    Note: The patient reports feeling at her baseline. IV Benadryl given per pt request as she tolerates it better.     Intravenous Access:  Peripheral IV placed.    Treatment Conditions:  Biological Infusion Checklist:  ~~~ NOTE: If the patient answers yes to any of the questions below, hold the infusion and contact ordering provider or on-call provider.    1. Have you recently had an elevated temperature, fever, chills, productive cough, coughing for 3 weeks or longer or hemoptysis, abnormal vital signs, night sweats,  chest pain or have you noticed a decrease in your appetite, unexplained weight loss or fatigue? No  2. Do you have any open wounds or new incisions? No  3. Do you have any recent or upcoming hospitalizations, surgeries or dental procedures? No  4. Do you currently have or recently have had any signs of illness or infection or are you on any antibiotics? No  5. Have you had any new, sudden or worsening abdominal pain? No  6. Have you or anyone in your household received a live vaccination in the past 4 weeks? Please note:  No live vaccines while on biologic/chemotherapy until 6 months after the last treatment.  Patient can receive the flu vaccine (shot only) and the pneumovax.  It is optimal for the patient to get these vaccines mid cycle, but they can be given at any time as long as it is not on the day of the infusion. No  7. Have you recently been diagnosed with any new nervous system diseases (ie. Multiple sclerosis, Guillain [place], seizures, neurological changes) or cancer diagnosis? No  8. Are you on any form of radiation or chemotherapy? No  9. Are you pregnant or breast feeding or do you have plans of pregnancy in the future? No  10. Have you been having any signs of worsening depression or suicidal ideations?  (benlysta only)  No  11. Have there been any other new onset medical symptoms? No      Post Infusion Assessment:  Patient tolerated infusion without incident.  Site patent and intact, free from redness, edema or discomfort.  No evidence of extravasations.  Access discontinued per protocol.  Biologic Infusion Post Education: Call the triage nurse at your clinic or seek medical attention if you have chills and/or temperature greater than or equal to 100.5, uncontrolled nausea/vomiting, diarrhea, constipation, dizziness, shortness of breath, chest pain, heart palpitations, weakness or any other new or concerning symptoms, questions or concerns.  You cannot have any live virus vaccines prior to or during treatment or up to 6 months post infusion.  If you have an upcoming surgery, medical procedure or dental procedure during treatment, this should be discussed with your ordering physician and your surgeon/dentist.  If you are having any concerning symptom, if you are unsure if you should get your next infusion or wish to speak to a provider before your next infusion, please call your care coordinator or triage nurse at your clinic to notify them so we can adequately serve you.     Discharge Plan:   AVS to patient via [employer].  Patient will return 2/10/23 for next appointment. Future appts have been reviewed and crosschecked with appt note and plan.   Patient discharged in stable condition accompanied by: self.  Departure Mode: Ambulatory.      Davina Glasgow RN

## 2023-01-28 ENCOUNTER — HEALTH MAINTENANCE LETTER (OUTPATIENT)
Age: 38
End: 2023-01-28

## 2023-02-10 ENCOUNTER — LAB (OUTPATIENT)
Dept: LAB | Facility: CLINIC | Age: 38
End: 2023-02-10
Payer: COMMERCIAL

## 2023-02-10 ENCOUNTER — INFUSION THERAPY VISIT (OUTPATIENT)
Dept: INFUSION THERAPY | Facility: CLINIC | Age: 38
End: 2023-02-10
Payer: COMMERCIAL

## 2023-02-10 VITALS
DIASTOLIC BLOOD PRESSURE: 67 MMHG | OXYGEN SATURATION: 98 % | HEART RATE: 73 BPM | RESPIRATION RATE: 16 BRPM | WEIGHT: 203.3 LBS | BODY MASS INDEX: 31.84 KG/M2 | SYSTOLIC BLOOD PRESSURE: 105 MMHG | TEMPERATURE: 98 F

## 2023-02-10 DIAGNOSIS — R73.09 ELEVATED GLUCOSE: ICD-10-CM

## 2023-02-10 DIAGNOSIS — M32.9 SYSTEMIC LUPUS ERYTHEMATOSUS, UNSPECIFIED SLE TYPE, UNSPECIFIED ORGAN INVOLVEMENT STATUS (H): Primary | ICD-10-CM

## 2023-02-10 DIAGNOSIS — R79.89 ELEVATED LFTS: ICD-10-CM

## 2023-02-10 DIAGNOSIS — M32.9 SYSTEMIC LUPUS ERYTHEMATOSUS, UNSPECIFIED SLE TYPE, UNSPECIFIED ORGAN INVOLVEMENT STATUS (H): ICD-10-CM

## 2023-02-10 LAB
ALBUMIN SERPL-MCNC: 3.8 G/DL (ref 3.4–5)
ALP SERPL-CCNC: 112 U/L (ref 40–150)
ALT SERPL W P-5'-P-CCNC: 57 U/L (ref 0–50)
AST SERPL W P-5'-P-CCNC: 26 U/L (ref 0–45)
BILIRUB DIRECT SERPL-MCNC: 0.1 MG/DL (ref 0–0.2)
BILIRUB SERPL-MCNC: 0.6 MG/DL (ref 0.2–1.3)
FASTING STATUS PATIENT QL REPORTED: YES
GLUCOSE BLD-MCNC: 110 MG/DL (ref 70–99)
PROT SERPL-MCNC: 7.9 G/DL (ref 6.8–8.8)

## 2023-02-10 PROCEDURE — 82947 ASSAY GLUCOSE BLOOD QUANT: CPT

## 2023-02-10 PROCEDURE — 36415 COLL VENOUS BLD VENIPUNCTURE: CPT

## 2023-02-10 PROCEDURE — 99207 PR NO CHARGE LOS: CPT

## 2023-02-10 PROCEDURE — 80076 HEPATIC FUNCTION PANEL: CPT

## 2023-02-10 PROCEDURE — 96367 TX/PROPH/DG ADDL SEQ IV INF: CPT | Performed by: INTERNAL MEDICINE

## 2023-02-10 PROCEDURE — 96413 CHEMO IV INFUSION 1 HR: CPT | Performed by: INTERNAL MEDICINE

## 2023-02-10 RX ORDER — DIPHENHYDRAMINE HCL 25 MG
25 CAPSULE ORAL ONCE
Status: CANCELLED
Start: 2023-02-24

## 2023-02-10 RX ORDER — ACETAMINOPHEN 325 MG/1
650 TABLET ORAL ONCE
Status: CANCELLED
Start: 2023-02-24

## 2023-02-10 RX ORDER — HEPARIN SODIUM,PORCINE 10 UNIT/ML
5 VIAL (ML) INTRAVENOUS
Status: CANCELLED | OUTPATIENT
Start: 2023-02-24

## 2023-02-10 RX ORDER — ACETAMINOPHEN 325 MG/1
650 TABLET ORAL ONCE
Status: COMPLETED | OUTPATIENT
Start: 2023-02-10 | End: 2023-02-10

## 2023-02-10 RX ORDER — METHYLPREDNISOLONE SODIUM SUCCINATE 125 MG/2ML
125 INJECTION, POWDER, LYOPHILIZED, FOR SOLUTION INTRAMUSCULAR; INTRAVENOUS
Status: CANCELLED
Start: 2023-02-24

## 2023-02-10 RX ORDER — EPINEPHRINE 1 MG/ML
0.3 INJECTION, SOLUTION INTRAMUSCULAR; SUBCUTANEOUS EVERY 5 MIN PRN
Status: CANCELLED | OUTPATIENT
Start: 2023-02-24

## 2023-02-10 RX ORDER — HEPARIN SODIUM (PORCINE) LOCK FLUSH IV SOLN 100 UNIT/ML 100 UNIT/ML
5 SOLUTION INTRAVENOUS
Status: CANCELLED | OUTPATIENT
Start: 2023-02-24

## 2023-02-10 RX ORDER — ALBUTEROL SULFATE 90 UG/1
1-2 AEROSOL, METERED RESPIRATORY (INHALATION)
Status: CANCELLED
Start: 2023-02-24

## 2023-02-10 RX ORDER — DIPHENHYDRAMINE HYDROCHLORIDE 50 MG/ML
50 INJECTION INTRAMUSCULAR; INTRAVENOUS
Status: CANCELLED
Start: 2023-02-24

## 2023-02-10 RX ORDER — MEPERIDINE HYDROCHLORIDE 25 MG/ML
25 INJECTION INTRAMUSCULAR; INTRAVENOUS; SUBCUTANEOUS EVERY 30 MIN PRN
Status: CANCELLED | OUTPATIENT
Start: 2023-02-24

## 2023-02-10 RX ORDER — ALBUTEROL SULFATE 0.83 MG/ML
2.5 SOLUTION RESPIRATORY (INHALATION)
Status: CANCELLED | OUTPATIENT
Start: 2023-02-24

## 2023-02-10 RX ADMIN — Medication 250 ML: at 08:52

## 2023-02-10 RX ADMIN — ACETAMINOPHEN 650 MG: 325 TABLET ORAL at 08:52

## 2023-02-10 NOTE — PROGRESS NOTES
Infusion Nursing Note:  Dasia Nguyen presents today for Benlysta.    Patient seen by provider today: No   present during visit today: Not Applicable.    Note: Patient reports fatigue today, states she feels good after receiving Benlysta but this being her first 4 week interval, she can tell that she is in need of the medication.    Patient reports she has been tolerating infusions with pre medications and requested IV benadryl.    Intravenous Access:  Peripheral IV placed.    Treatment Conditions:  Biological Infusion Checklist:  ~~~ NOTE: If the patient answers yes to any of the questions below, hold the infusion and contact ordering provider or on-call provider.    1. Have you recently had an elevated temperature, fever, chills, productive cough, coughing for 3 weeks or longer or hemoptysis, abnormal vital signs, night sweats,  chest pain or have you noticed a decrease in your appetite, unexplained weight loss or fatigue? No  2. Do you have any open wounds or new incisions? No  3. Do you have any recent or upcoming hospitalizations, surgeries or dental procedures? No  4. Do you currently have or recently have had any signs of illness or infection or are you on any antibiotics? No  5. Have you had any new, sudden or worsening abdominal pain? No  6. Have you or anyone in your household received a live vaccination in the past 4 weeks? Please note:  No live vaccines while on biologic/chemotherapy until 6 months after the last treatment.  Patient can receive the flu vaccine (shot only) and the pneumovax.  It is optimal for the patient to get these vaccines mid cycle, but they can be given at any time as long as it is not on the day of the infusion. No  7. Have you recently been diagnosed with any new nervous system diseases (ie. Multiple sclerosis, Guillain [place], seizures, neurological changes) or cancer diagnosis? No  8. Are you on any form of radiation or chemotherapy? No  9. Are you pregnant or  breast feeding or do you have plans of pregnancy in the future? No  10. Have you been having any signs of worsening depression or suicidal ideations?  (benlysta only) No  11. Have there been any other new onset medical symptoms? No      Post Infusion Assessment:  Patient tolerated infusion without incident.  Site patent and intact, free from redness, edema or discomfort.  No evidence of extravasations.  Access discontinued per protocol.  Biologic Infusion Post Education: Call the triage nurse at your clinic or seek medical attention if you have chills and/or temperature greater than or equal to 100.5, uncontrolled nausea/vomiting, diarrhea, constipation, dizziness, shortness of breath, chest pain, heart palpitations, weakness or any other new or concerning symptoms, questions or concerns.  You cannot have any live virus vaccines prior to or during treatment or up to 6 months post infusion.  If you have an upcoming surgery, medical procedure or dental procedure during treatment, this should be discussed with your ordering physician and your surgeon/dentist.  If you are having any concerning symptom, if you are unsure if you should get your next infusion or wish to speak to a provider before your next infusion, please call your care coordinator or triage nurse at your clinic to notify them so we can adequately serve you.     Discharge Plan:   Future appts have been reviewed and crosschecked with appt note and plan.  AVS to patient via [employer].  Patient will return 3/10/2023 for next appointment.   Patient discharged in stable condition accompanied by: self.  Departure Mode: Ambulatory.      Alyson Barnes RN

## 2023-03-10 ENCOUNTER — INFUSION THERAPY VISIT (OUTPATIENT)
Dept: INFUSION THERAPY | Facility: CLINIC | Age: 38
End: 2023-03-10
Attending: STUDENT IN AN ORGANIZED HEALTH CARE EDUCATION/TRAINING PROGRAM
Payer: COMMERCIAL

## 2023-03-10 VITALS
WEIGHT: 207 LBS | SYSTOLIC BLOOD PRESSURE: 117 MMHG | TEMPERATURE: 99.2 F | OXYGEN SATURATION: 98 % | DIASTOLIC BLOOD PRESSURE: 74 MMHG | HEART RATE: 84 BPM | RESPIRATION RATE: 16 BRPM | BODY MASS INDEX: 32.42 KG/M2

## 2023-03-10 DIAGNOSIS — M32.9 SYSTEMIC LUPUS ERYTHEMATOSUS, UNSPECIFIED SLE TYPE, UNSPECIFIED ORGAN INVOLVEMENT STATUS (H): Primary | ICD-10-CM

## 2023-03-10 PROCEDURE — 99207 PR NO CHARGE LOS: CPT

## 2023-03-10 PROCEDURE — 96367 TX/PROPH/DG ADDL SEQ IV INF: CPT | Performed by: INTERNAL MEDICINE

## 2023-03-10 PROCEDURE — 96413 CHEMO IV INFUSION 1 HR: CPT | Performed by: INTERNAL MEDICINE

## 2023-03-10 RX ORDER — MEPERIDINE HYDROCHLORIDE 25 MG/ML
25 INJECTION INTRAMUSCULAR; INTRAVENOUS; SUBCUTANEOUS EVERY 30 MIN PRN
Status: CANCELLED | OUTPATIENT
Start: 2023-04-07

## 2023-03-10 RX ORDER — DIPHENHYDRAMINE HCL 25 MG
25 CAPSULE ORAL ONCE
Status: CANCELLED
Start: 2023-04-07

## 2023-03-10 RX ORDER — ACETAMINOPHEN 325 MG/1
650 TABLET ORAL ONCE
Status: CANCELLED
Start: 2023-04-07

## 2023-03-10 RX ORDER — ALBUTEROL SULFATE 90 UG/1
1-2 AEROSOL, METERED RESPIRATORY (INHALATION)
Status: CANCELLED
Start: 2023-04-07

## 2023-03-10 RX ORDER — DIPHENHYDRAMINE HYDROCHLORIDE 50 MG/ML
50 INJECTION INTRAMUSCULAR; INTRAVENOUS
Status: CANCELLED
Start: 2023-04-07

## 2023-03-10 RX ORDER — ALBUTEROL SULFATE 0.83 MG/ML
2.5 SOLUTION RESPIRATORY (INHALATION)
Status: CANCELLED | OUTPATIENT
Start: 2023-04-07

## 2023-03-10 RX ORDER — HEPARIN SODIUM,PORCINE 10 UNIT/ML
5 VIAL (ML) INTRAVENOUS
Status: CANCELLED | OUTPATIENT
Start: 2023-04-07

## 2023-03-10 RX ORDER — ACETAMINOPHEN 325 MG/1
650 TABLET ORAL ONCE
Status: COMPLETED | OUTPATIENT
Start: 2023-03-10 | End: 2023-03-10

## 2023-03-10 RX ORDER — HEPARIN SODIUM (PORCINE) LOCK FLUSH IV SOLN 100 UNIT/ML 100 UNIT/ML
5 SOLUTION INTRAVENOUS
Status: CANCELLED | OUTPATIENT
Start: 2023-04-07

## 2023-03-10 RX ORDER — EPINEPHRINE 1 MG/ML
0.3 INJECTION, SOLUTION INTRAMUSCULAR; SUBCUTANEOUS EVERY 5 MIN PRN
Status: CANCELLED | OUTPATIENT
Start: 2023-04-07

## 2023-03-10 RX ORDER — METHYLPREDNISOLONE SODIUM SUCCINATE 125 MG/2ML
125 INJECTION, POWDER, LYOPHILIZED, FOR SOLUTION INTRAMUSCULAR; INTRAVENOUS
Status: CANCELLED
Start: 2023-04-07

## 2023-03-10 RX ADMIN — ACETAMINOPHEN 650 MG: 325 TABLET ORAL at 15:00

## 2023-03-10 RX ADMIN — Medication 250 ML: at 14:54

## 2023-03-10 NOTE — PROGRESS NOTES
~~~ NOTE: If the patient answers yes to any of the questions below, hold the infusion and contact ordering provider or on-call provider.    1. Have you recently had an elevated temperature, fever, chills, productive cough, coughing for 3 weeks or longer or hemoptysis,  abnormal vital signs, night sweats,  chest pain or have you noticed a decrease in your appetite, unexplained weight loss or fatigue? No  2. Do you have any open wounds or new incisions? No  3. Do you have any upcoming hospitalizations or surgeries? Does not include esophagogastroduodenoscopy, colonoscopy, endoscopic retrograde cholangiopancreatography (ERCP), endoscopic ultrasound (EUS), dental procedures or joint aspiration/steroid injections No  4. Do you currently have any signs of illness or infection or are you on any antibiotics? No  5. Have you had any new, sudden or worsening abdominal pain? No  6. Have you or anyone in your household received a live vaccination in the past 4 weeks? Please note: No live vaccines while on biologic/chemotherapy until 6 months after the last treatment. Patient can receive the flu vaccine (shot only), pneumovax and the Covid vaccine. It is optimal for the patient to get these vaccines mid cycle, but they can be given at any time as long as it is not on the day of the infusion. No  7. Have you recently been diagnosed with any new nervous system diseases (ie. Multiple sclerosis, Guillain [place], seizures, neurological changes) or cancer diagnosis? Are you on any form of radiation or chemotherapy? No  8. Are you pregnant or breast feeding or do you have plans of pregnancy in the future? No  9. Have you been having any signs of worsening depression or suicidal ideations?  (benlysta only) No  10. Have there been any other new onset medical symptoms? No

## 2023-03-10 NOTE — PROGRESS NOTES
"Infusion Nursing Note:  Dasia Nguyen presents today for Benlysta.    Patient seen by provider today: No   present during visit today: Not Applicable.    Note: Patient expressed overall not feeling well today due to being at the end of a flare. Patient stated that she was feeling uneasy and was having \"aura\" as if she was going to have a seizure. Patient stated that she took her medications and that symptoms were subsiding. Patient expressed by time of infusion that symptoms had improved.    Intravenous Access:  Peripheral IV placed.    Treatment Conditions:  Biological checklist completed by Armida HERNADEZ RN.    Post Infusion Assessment:  Patient tolerated infusion without incident.  Blood return noted pre and post infusion.  Site patent and intact, free from redness, edema or discomfort.  No evidence of extravasations.  Access discontinued per protocol.  Biologic Infusion Post Education: Call the triage nurse at your clinic or seek medical attention if you have chills and/or temperature greater than or equal to 100.5, uncontrolled nausea/vomiting, diarrhea, constipation, dizziness, shortness of breath, chest pain, heart palpitations, weakness or any other new or concerning symptoms, questions or concerns.  You cannot have any live virus vaccines prior to or during treatment or up to 6 months post infusion.  If you have an upcoming surgery, medical procedure or dental procedure during treatment, this should be discussed with your ordering physician and your surgeon/dentist.  If you are having any concerning symptom, if you are unsure if you should get your next infusion or wish to speak to a provider before your next infusion, please call your care coordinator or triage nurse at your clinic to notify them so we can adequately serve you.     Discharge Plan:   AVS to patient via [employer].  Patient will return 4/7/23 for next appointment.   Patient discharged in stable condition accompanied by: " self.  Departure Mode: Ambulatory.      Jovana Miller RN

## 2023-03-16 ENCOUNTER — MYC MEDICAL ADVICE (OUTPATIENT)
Dept: RHEUMATOLOGY | Facility: CLINIC | Age: 38
End: 2023-03-16
Payer: COMMERCIAL

## 2023-03-17 NOTE — TELEPHONE ENCOUNTER
I have not followed up with her since 9/2022.  I would like to follow back with her in clinic.  I have opening next Friday, March 24 at 10:30 AM.  I can see her then if she is able to make it.

## 2023-03-17 NOTE — TELEPHONE ENCOUNTER
Patient [facility] message received and message back to let her know I have sent it on to Dr. Herrera for review.    Gauri Stout RN  [facility]

## 2023-03-24 ENCOUNTER — LAB (OUTPATIENT)
Dept: LAB | Facility: OTHER | Age: 38
End: 2023-03-24
Payer: COMMERCIAL

## 2023-03-24 ENCOUNTER — VIRTUAL VISIT (OUTPATIENT)
Dept: RHEUMATOLOGY | Facility: CLINIC | Age: 38
End: 2023-03-24
Payer: COMMERCIAL

## 2023-03-24 DIAGNOSIS — M32.9 SYSTEMIC LUPUS ERYTHEMATOSUS, UNSPECIFIED SLE TYPE, UNSPECIFIED ORGAN INVOLVEMENT STATUS (H): ICD-10-CM

## 2023-03-24 DIAGNOSIS — M32.9 SYSTEMIC LUPUS ERYTHEMATOSUS, UNSPECIFIED SLE TYPE, UNSPECIFIED ORGAN INVOLVEMENT STATUS (H): Primary | ICD-10-CM

## 2023-03-24 DIAGNOSIS — R73.09 ELEVATED GLUCOSE: ICD-10-CM

## 2023-03-24 DIAGNOSIS — L93.2 CUTANEOUS LUPUS ERYTHEMATOSUS: ICD-10-CM

## 2023-03-24 LAB
ALBUMIN SERPL BCG-MCNC: 4 G/DL (ref 3.5–5.2)
ALT SERPL W P-5'-P-CCNC: 59 U/L (ref 10–35)
AST SERPL W P-5'-P-CCNC: 33 U/L (ref 10–35)
BASOPHILS # BLD AUTO: 0 10E3/UL (ref 0–0.2)
BASOPHILS NFR BLD AUTO: 0 %
CREAT SERPL-MCNC: 0.69 MG/DL (ref 0.51–0.95)
CRP SERPL-MCNC: 44.66 MG/L
EOSINOPHIL # BLD AUTO: 0.4 10E3/UL (ref 0–0.7)
EOSINOPHIL NFR BLD AUTO: 7 %
ERYTHROCYTE [DISTWIDTH] IN BLOOD BY AUTOMATED COUNT: 13 % (ref 10–15)
ERYTHROCYTE [SEDIMENTATION RATE] IN BLOOD BY WESTERGREN METHOD: 18 MM/HR (ref 0–20)
GFR SERPL CREATININE-BSD FRML MDRD: >90 ML/MIN/1.73M2
HCT VFR BLD AUTO: 40.8 % (ref 35–47)
HGB BLD-MCNC: 13.8 G/DL (ref 11.7–15.7)
LYMPHOCYTES # BLD AUTO: 1.5 10E3/UL (ref 0.8–5.3)
LYMPHOCYTES NFR BLD AUTO: 26 %
MCH RBC QN AUTO: 28.8 PG (ref 26.5–33)
MCHC RBC AUTO-ENTMCNC: 33.8 G/DL (ref 31.5–36.5)
MCV RBC AUTO: 85 FL (ref 78–100)
MONOCYTES # BLD AUTO: 0.4 10E3/UL (ref 0–1.3)
MONOCYTES NFR BLD AUTO: 8 %
NEUTROPHILS # BLD AUTO: 3.4 10E3/UL (ref 1.6–8.3)
NEUTROPHILS NFR BLD AUTO: 59 %
PLATELET # BLD AUTO: 318 10E3/UL (ref 150–450)
RBC # BLD AUTO: 4.8 10E6/UL (ref 3.8–5.2)
WBC # BLD AUTO: 5.7 10E3/UL (ref 4–11)

## 2023-03-24 PROCEDURE — 36415 COLL VENOUS BLD VENIPUNCTURE: CPT

## 2023-03-24 PROCEDURE — 86225 DNA ANTIBODY NATIVE: CPT

## 2023-03-24 PROCEDURE — 99214 OFFICE O/P EST MOD 30 MIN: CPT | Mod: VID | Performed by: STUDENT IN AN ORGANIZED HEALTH CARE EDUCATION/TRAINING PROGRAM

## 2023-03-24 PROCEDURE — 86140 C-REACTIVE PROTEIN: CPT

## 2023-03-24 PROCEDURE — 83036 HEMOGLOBIN GLYCOSYLATED A1C: CPT

## 2023-03-24 PROCEDURE — 85652 RBC SED RATE AUTOMATED: CPT

## 2023-03-24 PROCEDURE — 82040 ASSAY OF SERUM ALBUMIN: CPT

## 2023-03-24 PROCEDURE — 86160 COMPLEMENT ANTIGEN: CPT

## 2023-03-24 PROCEDURE — 84450 TRANSFERASE (AST) (SGOT): CPT

## 2023-03-24 PROCEDURE — 86160 COMPLEMENT ANTIGEN: CPT | Mod: 59

## 2023-03-24 PROCEDURE — 82565 ASSAY OF CREATININE: CPT

## 2023-03-24 PROCEDURE — 85025 COMPLETE CBC W/AUTO DIFF WBC: CPT

## 2023-03-24 PROCEDURE — 84460 ALANINE AMINO (ALT) (SGPT): CPT

## 2023-03-24 NOTE — PROGRESS NOTES
Virtual Visit Details    Type of service:  Video Visit   Video Start Time: 10:26 AM  Video End Time:11:09 AM    Originating Location (pt. Location): Home    Distant Location (provider location):  On-site  Platform used for Video Visit: [facility]    Rheumatology Clinic Visit     Dasia Nguyen MRN# [id number]   YOB: 1985 Age: 36 year old     Date of Visit: Mar 24, 2023   Primary care provider: Kaushik Luna          Assessment and Plan:     Assessment     Systemic lupus - ( cutaneous lupus, arthralgia, oral sores, photosensitivity, +dsDNA )  Facial rash - cutaneous lupus - seen dermatologist  Recurrent oral sores  Chest pain  Fatigue  Headaches  Neg AYAN, RF, ACPA  +dsDNA - 11, Neg SSA/SSB, centromere, Scl-70, Sm.     Ms. Nguyen is 38 year old female seen in clinic for evaluation of autoimmune connective tissue disease.    Positive dsDNA : For the last 2 years she has multiple symptoms including fatigue, joint pains, facial rash, photosensitivity, chest pain, recurrent oral sores. Her autoimmune work up showed negative AYAN but had mildly elevated dsDNA.  Her facial rash was diagnosed as acute cutaneous lupus by her dermatologist. On exam she had mild tenderness over MCP, PIP joints, ankles and MTP joints but no synovitis noted.     She was started on Plaquenil in 12/2021 and it helps little. Her repeat autoimmune serologies in 7/2022 showed elevated dsDNA of 18, normal C3/C4, AYAN, CBC, CMP. Due to worsening symptoms she was started on Methotrexate and was up to 6 tab once a week, but had elevated LFT's and was discontinued.     Later Benlysta infusions were started in 12/2022. She has noted 70 % improvement after starting Infusions. Facial rash, oral sores, joint pains have reduced. Will continue Benlysta infusions and Plaquenil. She will get updated eye exam in 4/2023.     Blood tests orders placed for monitoring.     Seizure like episodes : Seen by Neurology, and EEG is normal. Her seizures are  not related to Epilepsy.  She uses as needed Benadryl for seizure-like episodes and it helps.    Nausea, Vomiting : She is having nausea and vomiting since yesterday and has thrown all night every 30 min. She is little better now.  If it worsens then recommend her to go to ER and get checked for pancreatitis, cholecystitis, gastritis or viral illness. She is not pregnant.     Plan    Will continue Benlysta infusion     Continue Plaquenil 400 mg daily     Blood tests orders placed     Follow up 4 - 6 months     -- Orders placed this encounter  Orders Placed This Encounter   Procedures     AST     ALT     Albumin level     Creatinine     Erythrocyte sedimentation rate auto     CRP inflammation     DNA ANTIBODY, NATV/2 STRAND     Complement C3     Complement C4       TT 30 min was spent on date of the encounter doing chart review, history and exam, documentation and further activities as noted above. Any prior notes, outside records, laboratory results, and imaging studies were reviewed if relevant.    Patient verbalized agreement with and understanding of the rationale for the diagnosis and treatment plan.  All questions were answered to best of my ability and the patient's satisfaction.      Chart documentation done in part with Dragon Voice recognition Software. Although reviewed after completion, some word and grammatical error may remain.                  Active Problem List:     Patient Active Problem List    Diagnosis Date Noted     Systemic lupus erythematosus (H) 08/06/2022     Priority: Medium     Chest pain on breathing 11/24/2021     Priority: Medium     Fatigue 11/24/2021     Priority: Medium     Multiple joint pain 11/24/2021     Priority: Medium     Photosensitivity 11/24/2021     Priority: Medium            History of Present Illness:   Dasia Nguyen is a 38 year old female with PMH of facial rash, joint pains, chest pain, +dsdna seen in the clinic for management of systemic lupus.     History from  initial visit  : She was in her usual state of health up until 3 years ago when she started noticing fatigue, joint pains, on and off chest pains. Her symptoms came as flare ups when all her symptoms would increase many folds. Her last severe flare was summer 2021 She had severe body aches, joint pains and could not get out of bed for couple weeks. In addition she was extremely fatigued and had headaches, dizziness, chest pains and oral sores. She also noticed being very photosensitive and developed facial rash. She saw dermatologist [facility] who diagnosed her with acute cutaneous lupus.     Beginning May 2021, she started having seizures like episodes at night. She describes it as hot tingling sensation and her body will shake for 15 - 20 sec. She has fallen out of bed during those episodes. She was seen by Neurologist and had EEG which did not show epilepsy. MRI head, C-spine, T-spine did not show demyelinated lesions. She was referred to cardiology to make sure that her symptoms are not related to Arrhythmias. Echocardiogram was normal. CT chest showed some calcification in the region of LAD.     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 Her ankles will hurt. In the morning she does not have strength to grasp things. She gets pain in her wrists, elbows. She has fatigue and feel she has flu every day. She has headaches every day. She takes naps during the day. Her chest hurt, feel as if someone is stepping on it. She always take shallow breaths. She was given medrol dos russ and responded well to it.     She has herniated discs in her back and always had chronic low back pain.    She gets recurrent UTI and possibly had kidney stones.     Autoimmune work up showed Neg AYAN, KIANNA, RF, ACPA but positive dsDNA. She had mildly elevated ESR of 26 in 6/2021.   Denies any raynauds, sicca symptoms, recurrent sinusitis/rhinitis, swallowing difficulty, hearing or visual changes  recently. No h/o arterial/venous thrombosis in the past. Hx of one 5 week pregnancy loss.      July 6, 2022 - Plaquenil has helped with her facial rash. 2 months ago she started having seizures. When she is on flare up she gets seizures 5 hours straight. Whole body is so tired, she is sleeping 13 hours a day. She has had EEG which was normal. She was told to take Benadryl when it will happen.  She is shaking, she does not have control, hitting herself. She has overall feeling of being sick, can not get out of bed.  She has 5 kids. She has feeling of being sick all the time. She has pain in her joints. She gets pain in her thumb joints, pinky, big toes. She has noticed that when she is very tired she gets flutters in her chest. She gets oral sores, big ones on the inner lip, right nostril. She has tried prednisone and felt that it helped with inflammation, joint aches. Every couple months she gets very sick. When she is walking a lot she gets swelling and puffiness in her ankles. She takes Ibuprofen as needed.     September 8, 2022 - She has not had facial rash and oral sores in a while. She feel that her fingers are not hurting. She feel that her chest is very tight. She is on Methotrexate 6 tab once a week for 7 weeks. She is on Plaquenil 200 mg twice daily. She is off of prednisone. She still get seizure like symptoms and use Benadryl as needed for these symptoms. She gets numbness in her arms when she lie down.     March 24, 2023 - She was throwing up all night last night. If she is laying on her back then she gets Abdominal pain and nausea. She is on Benlysta infusions monthly since 12/2022 and Plaquenil 400 mg daily for SLE. She has overall noticed improvement on Benlysta. She has more good days than bad days. Facial rash has resolved. A month ago she had flare up and had facial rash as well. Oral sores have reduced in frequency. She has scheduled appointment for eye exam in April 2023.          Review of  Systems:     Review Of Systems  Constitutional: denies fever, chills, night sweats and weight loss.  Skin: + skin rash.  Eyes: No dryness or irritation in eyes. No episode of eye inflammation or redness.   Ears/Nose/Throat: no recurrent sinus infections.  Respiratory: No shortness of breath, dyspnea on exertion, cough, or hemoptysis  Cardiovascular: + chest pain or palpitations.  Gastrointestinal: no nausea, vomiting, abdominal pain.  Normal bowel movements.  Genitourinary: no dysuria, frequency  or hematuria.  Musculoskeletal: as in HPI  Neurologic: no numbness, tingling.  Psychiatric: no mood disorders.  Hematologic/Lymphatic/Immunologic: no history of easy bruising, petechia or purpura.  No abnormal bleeding.   Endocrine: no h/o thyroid disease or Diabetes.                  Past Medical History:     Past Medical History:   Diagnosis Date     Chest pain on breathing      Fatigue      Multiple joint pain      Photosensitivity      Past Surgical History:   Procedure Laterality Date     NO HISTORY OF SURGERY              Social History:     Social History     Occupational History     Not on file   Tobacco Use     Smoking status: Never     Smokeless tobacco: Never   Substance and Sexual Activity     Alcohol use: Not Currently     Drug use: Never     Sexual activity: Not on file            Family History:     Family History   Problem Relation Age of Onset     Lupus Maternal Uncle      Sarcoidosis Maternal Aunt             Allergies:     Allergies   Allergen Reactions     Other Environmental Allergy      Cat dander            Medications:     Current Outpatient Medications   Medication Sig Dispense Refill     diphenhydrAMINE (BENADRYL) 25 MG tablet Take 2-4 tablets by mouth As needed for seizure       hydroxychloroquine (PLAQUENIL) 200 MG tablet Take 1 tablet (200 mg) by mouth 2 times daily 180 tablet 1     magic mouthwash (ENTER INGREDIENTS IN COMMENTS) suspension Guafenesin - 70cc  2% viscous lidocaine -  30cc  Diphenhydramine (12.5/5cc) - 200cc  Nystatin - 30 cc  Sucralfate - 100cc  Maalox - 50cc  Disp: 480 cc(16 oz)  Sig: Use 3 tsp.(15cc), t.i.d. Swish for 3 mins, gargle and expectorate for 2 weeks. Then use prn for maintenance. 480 mL 1     Multiple Vitamin (MULTIVITAMINS PO)        Belimumab (BENLYSTA) 200 MG/ML SOAJ Inject 200 mg Subcutaneous every 7 days 4 mL 1     calcipotriene (DOVONOX) 0.005 % external ointment APPLY TOPICALLY TO FACE AS NEEDED RASH (Patient not taking: Reported on 9/8/2022)              Physical Exam:   There were no vitals taken for this visit.  Wt Readings from Last 4 Encounters:   03/10/23 93.9 kg (207 lb)   02/10/23 92.2 kg (203 lb 4.8 oz)   01/13/23 92.1 kg (203 lb 1.6 oz)   12/26/22 91.7 kg (202 lb 3.2 oz)       Constitutional: obesity, appearing stated age; cooperative    MS: NoTenderness reported over MCP, PIP joints, b/l wrists, elbows, ankles resolved. no synovitis noted. Full ROM.  Normal  strength.   -- No dactylitis,  tenosynovitis, enthesopathy.    Skin: no nail pitting, alopecia, rash, nodules or lesions           Data:     No results found for any visits on 03/24/23.    Hemoglobin   Date Value Ref Range Status   09/08/2022 13.2 11.7 - 15.7 g/dL Final   07/06/2022 13.6 11.7 - 15.7 g/dL Final   11/24/2021 14.1 11.7 - 15.7 g/dL Final     Urea Nitrogen   Date Value Ref Range Status   07/06/2022 12 7 - 30 mg/dL Final   11/24/2021 13 7 - 30 mg/dL Final     Erythrocyte Sedimentation Rate   Date Value Ref Range Status   09/08/2022 18 0 - 20 mm/hr Final   11/24/2021 18 0 - 20 mm/hr Final     CRP Inflammation   Date Value Ref Range Status   11/24/2021 3.1 0.0 - 8.0 mg/L Final     AST   Date Value Ref Range Status   02/10/2023 26 0 - 45 U/L Final   09/08/2022 20 0 - 45 U/L Final   07/06/2022 10 0 - 45 U/L Final     Albumin   Date Value Ref Range Status   02/10/2023 3.8 3.4 - 5.0 g/dL Final   09/08/2022 4.1 3.4 - 5.0 g/dL Final   07/06/2022 3.8 3.4 - 5.0 g/dL Final     Alkaline  Phosphatase   Date Value Ref Range Status   02/10/2023 112 40 - 150 U/L Final   07/06/2022 89 40 - 150 U/L Final   11/24/2021 106 40 - 150 U/L Final     ALT   Date Value Ref Range Status   02/10/2023 57 (H) 0 - 50 U/L Final   09/08/2022 61 (H) 0 - 50 U/L Final   07/06/2022 22 0 - 50 U/L Final     Rheumatoid Factor   Date Value Ref Range Status   11/24/2021 <7 <12 IU/mL Final     Recent Labs   Lab Test 02/10/23  0800 09/08/22  1111 07/06/22  1153 11/24/21  1239   WBC  --  6.0 6.2 7.1   HGB  --  13.2 13.6 14.1   HCT  --  39.9 40.4 42.3   MCV  --  88 85 87   PLT  --  359 314 395   BUN  --   --  12 13   AST 26 20 10 18   ALT 57* 61* 22 33   ALKPHOS 112  --  89 106        Outside studies reviewed: Results from [facility] reviewed     Reviewed Rheumatology lab flowsheet    Felicita Herrera MD  [facility]  Department of Rheumatology & Autoimmune Disorders  [address] [place]: [phone]   Pager - [phone]

## 2023-03-24 NOTE — PATIENT INSTRUCTIONS
Will continue Benlysta infusion     Continue Plaquenil 400 mg daily     Blood tests orders placed     Follow up 4 - 6 months

## 2023-03-27 ENCOUNTER — TELEPHONE (OUTPATIENT)
Dept: RHEUMATOLOGY | Facility: CLINIC | Age: 38
End: 2023-03-27
Payer: COMMERCIAL

## 2023-03-27 DIAGNOSIS — M32.9 SYSTEMIC LUPUS ERYTHEMATOSUS, UNSPECIFIED SLE TYPE, UNSPECIFIED ORGAN INVOLVEMENT STATUS (H): Primary | ICD-10-CM

## 2023-03-27 LAB
C3 SERPL-MCNC: 166 MG/DL (ref 81–157)
C4 SERPL-MCNC: 45 MG/DL (ref 13–39)
DSDNA AB SER-ACNC: 13 IU/ML

## 2023-03-27 NOTE — TELEPHONE ENCOUNTER
----- Message from Felicita Herrera MD sent at 3/24/2023  2:16 PM CDT -----  Hi there,    Patient inquired about getting a port for Benlysta infusions.  I am okay with getting that if she does not want to change it into Benlysta subcu injection.  We can refer her to interventional radiology for port placement.    Felicita Herrera MD

## 2023-03-28 NOTE — TELEPHONE ENCOUNTER
Pt requesting a call back.She is having surgery on Friday, and would like know if infusion center use the port for access instead of a vein. Please reach out to pt.

## 2023-03-29 ENCOUNTER — MYC MEDICAL ADVICE (OUTPATIENT)
Dept: INTERVENTIONAL RADIOLOGY/VASCULAR | Facility: CLINIC | Age: 38
End: 2023-03-29
Payer: COMMERCIAL

## 2023-03-29 LAB — HBA1C MFR BLD: 5.5 % (ref 0–5.6)

## 2023-03-31 ENCOUNTER — APPOINTMENT (OUTPATIENT)
Dept: INTERVENTIONAL RADIOLOGY/VASCULAR | Facility: CLINIC | Age: 38
End: 2023-03-31
Attending: STUDENT IN AN ORGANIZED HEALTH CARE EDUCATION/TRAINING PROGRAM
Payer: COMMERCIAL

## 2023-03-31 ENCOUNTER — HOSPITAL ENCOUNTER (OUTPATIENT)
Facility: CLINIC | Age: 38
Discharge: HOME | End: 2023-03-31
Admitting: RADIOLOGY
Payer: COMMERCIAL

## 2023-03-31 VITALS
RESPIRATION RATE: 16 BRPM | OXYGEN SATURATION: 96 % | HEART RATE: 85 BPM | SYSTOLIC BLOOD PRESSURE: 114 MMHG | TEMPERATURE: 97 F | WEIGHT: 170 LBS | BODY MASS INDEX: 26.68 KG/M2 | DIASTOLIC BLOOD PRESSURE: 64 MMHG | HEIGHT: 67 IN

## 2023-03-31 DIAGNOSIS — M32.9 SYSTEMIC LUPUS ERYTHEMATOSUS, UNSPECIFIED SLE TYPE, UNSPECIFIED ORGAN INVOLVEMENT STATUS (H): ICD-10-CM

## 2023-03-31 LAB — HCG UR QL: NEGATIVE

## 2023-03-31 PROCEDURE — C1788 PORT, INDWELLING, IMP: HCPCS

## 2023-03-31 PROCEDURE — 250N000011 HC RX IP 250 OP 636: Performed by: NURSE PRACTITIONER

## 2023-03-31 PROCEDURE — 250N000011 HC RX IP 250 OP 636: Performed by: RADIOLOGY

## 2023-03-31 PROCEDURE — 250N000009 HC RX 250: Performed by: NURSE PRACTITIONER

## 2023-03-31 PROCEDURE — 99152 MOD SED SAME PHYS/QHP 5/>YRS: CPT

## 2023-03-31 PROCEDURE — 999N000163 HC STATISTIC SIMPLE TUBE INSERTION/CHARGE, PORT, CATH, FISTULOGRAM

## 2023-03-31 PROCEDURE — 272N000196 HC ACCESSORY CR5

## 2023-03-31 PROCEDURE — 81025 URINE PREGNANCY TEST: CPT | Performed by: NURSE PRACTITIONER

## 2023-03-31 PROCEDURE — 250N000009 HC RX 250: Performed by: RADIOLOGY

## 2023-03-31 RX ORDER — NALOXONE HYDROCHLORIDE 0.4 MG/ML
0.2 INJECTION, SOLUTION INTRAMUSCULAR; INTRAVENOUS; SUBCUTANEOUS
Status: DISCONTINUED | OUTPATIENT
Start: 2023-03-31 | End: 2023-03-31 | Stop reason: HOSPADM

## 2023-03-31 RX ORDER — ACETAMINOPHEN 325 MG/1
650 TABLET ORAL
Status: DISCONTINUED | OUTPATIENT
Start: 2023-03-31 | End: 2023-03-31 | Stop reason: HOSPADM

## 2023-03-31 RX ORDER — FLUMAZENIL 0.1 MG/ML
0.2 INJECTION, SOLUTION INTRAVENOUS
Status: DISCONTINUED | OUTPATIENT
Start: 2023-03-31 | End: 2023-03-31 | Stop reason: HOSPADM

## 2023-03-31 RX ORDER — NALOXONE HYDROCHLORIDE 0.4 MG/ML
0.4 INJECTION, SOLUTION INTRAMUSCULAR; INTRAVENOUS; SUBCUTANEOUS
Status: DISCONTINUED | OUTPATIENT
Start: 2023-03-31 | End: 2023-03-31 | Stop reason: HOSPADM

## 2023-03-31 RX ORDER — FENTANYL CITRATE 50 UG/ML
25-50 INJECTION, SOLUTION INTRAMUSCULAR; INTRAVENOUS EVERY 5 MIN PRN
Status: DISCONTINUED | OUTPATIENT
Start: 2023-03-31 | End: 2023-03-31 | Stop reason: HOSPADM

## 2023-03-31 RX ORDER — LIDOCAINE 40 MG/G
CREAM TOPICAL
Status: DISCONTINUED | OUTPATIENT
Start: 2023-03-31 | End: 2023-03-31 | Stop reason: HOSPADM

## 2023-03-31 RX ORDER — CEFAZOLIN SODIUM 2 G/100ML
2 INJECTION, SOLUTION INTRAVENOUS
Status: COMPLETED | OUTPATIENT
Start: 2023-03-31 | End: 2023-03-31

## 2023-03-31 RX ORDER — LIDOCAINE HYDROCHLORIDE AND EPINEPHRINE 5; 5 MG/ML; UG/ML
10 INJECTION, SOLUTION INFILTRATION; PERINEURAL ONCE
Status: COMPLETED | OUTPATIENT
Start: 2023-03-31 | End: 2023-03-31

## 2023-03-31 RX ORDER — HEPARIN SODIUM (PORCINE) LOCK FLUSH IV SOLN 100 UNIT/ML 100 UNIT/ML
5 SOLUTION INTRAVENOUS ONCE
Status: COMPLETED | OUTPATIENT
Start: 2023-03-31 | End: 2023-03-31

## 2023-03-31 RX ADMIN — FENTANYL CITRATE 50 MCG: 50 INJECTION, SOLUTION INTRAMUSCULAR; INTRAVENOUS at 13:41

## 2023-03-31 RX ADMIN — FENTANYL CITRATE 50 MCG: 50 INJECTION, SOLUTION INTRAMUSCULAR; INTRAVENOUS at 13:33

## 2023-03-31 RX ADMIN — LIDOCAINE HYDROCHLORIDE 8 ML: 10 INJECTION, SOLUTION INFILTRATION; PERINEURAL at 13:46

## 2023-03-31 RX ADMIN — Medication 5 ML: at 13:40

## 2023-03-31 RX ADMIN — LIDOCAINE HYDROCHLORIDE AND EPINEPHRINE 10 ML: 5; 5 INJECTION, SOLUTION INFILTRATION; PERINEURAL at 13:48

## 2023-03-31 RX ADMIN — CEFAZOLIN SODIUM 2 G: 2 INJECTION, SOLUTION INTRAVENOUS at 13:39

## 2023-03-31 RX ADMIN — MIDAZOLAM 2 MG: 1 INJECTION INTRAMUSCULAR; INTRAVENOUS at 13:32

## 2023-03-31 ASSESSMENT — ACTIVITIES OF DAILY LIVING (ADL)
ADLS_ACUITY_SCORE: 35
ADLS_ACUITY_SCORE: 35

## 2023-03-31 NOTE — PRE-PROCEDURE
GENERAL PRE-PROCEDURE:   Procedure:  Port a catheter placement with moderate sedation   Date/Time:  3/31/2023 11:36 AM    Written consent obtained?: Yes    Risks and benefits: Risks, benefits and alternatives were discussed    Consent given by:  Patient  Patient states understanding of procedure being performed: Yes    Patient's understanding of procedure matches consent: Yes    Procedure consent matches procedure scheduled: Yes    Expected level of sedation:  Moderate  Appropriately NPO:  Yes  ASA Class:  3  Mallampati  :  Grade 1- soft palate, uvula, tonsillar pillars, and posterior pharyngeal wall visible  Lungs:  Lungs clear with good breath sounds bilaterally  Heart:  Normal heart sounds and rate and systolic murmur  History & Physical reviewed:  History and physical reviewed and no updates needed  Statement of review:  I have reviewed the lab findings, diagnostic data, medications, and the plan for sedation  Patient relayed that she usually needs extra lidocaine and sedation for them to work. She was instructed to let the RN or MD know if she is feeling anything plus I'll pass on the message.     Total time: 20 minutes    Thanks [facility] Interventional Radiology CNP ([phone]) (phone [phone])

## 2023-03-31 NOTE — PROGRESS NOTES
Care Suites Discharge Nursing Note    Patient Information  Name: Dasia Nguyen  Age: 38 year old    Discharge Education:  Discharge instructions reviewed: Yes  Additional education/resources provided: port pamphlet and ID's given to pt  Patient/patient representative verbalizes understanding: Yes  Patient discharging on new medications: No  Medication education completed: Yes    Discharge Plans:   Discharge location: home  Discharge ride contacted: Yes  Approximate discharge time: 1455    Discharge Criteria:  Discharge criteria met and vital signs stable: Yes, ambulatory in room, good appetite, denies any pain or nausea, tolerated box lunch and ginger ale well, discharged to home per w/c with  driving, in stable condition.     Patient Belongs:  Patient belongings returned to patient: Yes    Arely Freeman RN

## 2023-03-31 NOTE — PROGRESS NOTES
Care Suites Post Procedure Note    Patient Information  Name: Dasia Nguyen  Age: 38 year old    Post Procedure  Time patient returned to Care Suites: 1410  Concerns/abnormal assessment: none  If abnormal assessment, provider notified: N/A  Plan/Other: observe until discharge, around 1445. States tolerated port placement well, denies any c/o, port pamphlet with ID's given to pt. AVS was reviewed with pt and  pre procedure. Pt eating box lunch and Ginger Ale now. VSS.  in room with pt. Port site dsng and neck site dsng dry and intact with no bleeding or hematoma.     Arely Freeman RN

## 2023-03-31 NOTE — PROGRESS NOTES
Interventional Radiology Intra-procedural Nursing Note    Patient Name: Dasia Nguyen  Medical Record Number: [id number]  Today's Date: March 31, 2023    Start Time: 1328  End of procedure time: 1357  Procedure: Placement of right 6FR  single lumen power port  Report given to: care suites RN  Time pt departs:  1403      Other Notes:  Unable to obtain IV access pre-operative.  Per Dr. Gilliland will give sedation through access site.  Patient prepped with chloraprep and draped sterilly.  Ancef 2 GM  Versed 2 mg  Fentanyl 100 mcg      Debrief completed.  Patient tolerated procedure well.  Port information sent with patient  Patient developed a reddened area from the adhesive on the drape.  Bacitracin applied to site.  Instructed to put vaseline on site at home  Dermabond/ gauze /tegaderm to site  Right port accessed Heparinized 500 units/ ml  5ml given    Carmen Oliva RN

## 2023-03-31 NOTE — PROGRESS NOTES
Care Suites Admission Nursing Note    Patient Information  Name: Dasia Nguyen  Age: 38 year old  Reason for admission: port placement  Care Suites arrival time: 1100    Visitor Information  Name:  Usman  Informed of visitor restrictions: Yes  2 visitor allowed per patient   Visitor must wear a mask    Patient Admission/Assessment   Pre-procedure assessment complete: Yes  If abnormal assessment/labs, provider notified: N/A  NPO: Yes  Medications held per instructions/orders: Yes  Consent: obtained  If applicable, pregnancy test status: obtained  Patient oriented to room: Yes  Education/questions answered: Yes  Plan/other: port placement    Discharge Planning  Discharge name/phone number: Usman [phone]  Overnight post sedation caregiver: Usman  Discharge location: home    Arely Freeman RN

## 2023-03-31 NOTE — PROGRESS NOTES
Care Suites Admission Nursing Note    Reason for admission: Port Placement  CS arrival time: 1103    Accompanied by:  - Usman  Name/phone of DC : Usman ... [phone]    Education/questions answered: Procedure explained. All questions & concerns addressed.  Plan: Home with  post procedure.    A/O. Denies pain. Pt resting on cart, denies additional needs at this time, call light within reach.     [facility] pt. Consent signed at this time.

## 2023-03-31 NOTE — DISCHARGE INSTRUCTIONS
Port Insertion Discharge Instructions     After you go home:    Have an adult stay with you for the first 6 hours  You may resume your normal diet       For 24 hours - due to the sedation you received:  Relax and take it easy  Do NOT make any important or legal decisions  Do NOT drive or operate machines at home or at work  Do NOT drink alcohol    Care of Puncture Sites:    Keep the dressings on your sites clean & dry for 3 days. Change it only if it gets wet or dirty.  You may take a shallow bath until your doctor says it is safe to shower.  Do not remove the small white strips of tape, if present. Allow them to fall off on their own.   You may cover the wound with a bandaid after the dressing is removed if needed for comfort      Activity     Avoid heavy lifting (greater than 10 pounds) or the overuse of your shoulder for 2 weeks    Bleeding:    If you start bleeding from the incision sites in your chest or neck - or have swelling in your neck, sit down and press on the site for 5-10 minutes.   If bleeding has not stopped after 10 minutes, call your provider.        Call 911 right away if you have heavy bleeding or bleeding that does not stop.      Medicines:    You may resume all medications  Resume your Warfarin/Coumadin at your regular dose today. Follow up with your provider to have your INR rechecked  Resume your Platelet Inhibitors and Aspirin tomorrow at your regular dose  For minor pain, you may take Acetaminophen (Tylenol) or Ibuprofen (Advil)    Call the provider who ordered this procedure if:    You have swelling in your neck or over your port site  The incision area is red, swollen, hot or tender  You have chills or a fever greater than 101 F (38 C)  Any questions or concerns    Call  911 or go to the Emergency Room if you have:    Severe chest pain or trouble breathing  Bleeding that you cannot control    Additional Information:    Your port may be accessed right away.   You will need to have your  port flushed every 30 days or after each use.      If you have questions call:          [facility] Radiology Dept @ [phone]      The provider who performed your procedure was Dr. Gilliland.

## 2023-04-02 ENCOUNTER — NURSE TRIAGE (OUTPATIENT)
Dept: NURSING | Facility: CLINIC | Age: 38
End: 2023-04-02
Payer: COMMERCIAL

## 2023-04-03 ENCOUNTER — TELEPHONE (OUTPATIENT)
Dept: RHEUMATOLOGY | Facility: CLINIC | Age: 38
End: 2023-04-03
Payer: COMMERCIAL

## 2023-04-03 NOTE — TELEPHONE ENCOUNTER
[facility] Call Center    Phone Message    May a detailed message be left on voicemail: yes     Reason for Call: Other: Question about infusion this Friday, 4/7/23     Pt is calling regarding her benlysta infusion for Friday? Pt was told to call and see if she can still get the infusion for she is being treated for pneumonia and is on antibiotics.   Friday will be day 6 out of 10 days of being on the antibiotic medication.    Action Taken: Message routed to:  Other: [facility] Rheumatology

## 2023-04-03 NOTE — TELEPHONE ENCOUNTER
I will hold her Benlysta infusion in this case. She will have to finish her antibiotics and then can schedule it.

## 2023-04-04 ENCOUNTER — MYC MEDICAL ADVICE (OUTPATIENT)
Dept: RHEUMATOLOGY | Facility: CLINIC | Age: 38
End: 2023-04-04
Payer: COMMERCIAL

## 2023-04-04 ENCOUNTER — MYC MEDICAL ADVICE (OUTPATIENT)
Dept: RHEUMATOLOGY | Facility: CLINIC | Age: 38
End: 2023-04-04

## 2023-04-04 ENCOUNTER — LAB (OUTPATIENT)
Dept: LAB | Facility: OTHER | Age: 38
End: 2023-04-04
Payer: COMMERCIAL

## 2023-04-04 DIAGNOSIS — R79.89 ELEVATED LIVER FUNCTION TESTS: ICD-10-CM

## 2023-04-04 DIAGNOSIS — M32.9 SYSTEMIC LUPUS ERYTHEMATOSUS, UNSPECIFIED SLE TYPE, UNSPECIFIED ORGAN INVOLVEMENT STATUS (H): Primary | ICD-10-CM

## 2023-04-04 DIAGNOSIS — R79.89 ELEVATED LIVER FUNCTION TESTS: Primary | ICD-10-CM

## 2023-04-04 PROCEDURE — 84075 ASSAY ALKALINE PHOSPHATASE: CPT

## 2023-04-04 PROCEDURE — 36415 COLL VENOUS BLD VENIPUNCTURE: CPT

## 2023-04-04 NOTE — TELEPHONE ENCOUNTER
Lab called and alkaline phosphatase was not completed. Blood collected 3/24/23 is no longer viable.  Patient will need to be redrawn. Will send [facility] message to the patient.     Dang Lopez RN

## 2023-04-05 ENCOUNTER — TELEPHONE (OUTPATIENT)
Dept: INTERVENTIONAL RADIOLOGY/VASCULAR | Facility: CLINIC | Age: 38
End: 2023-04-05
Payer: COMMERCIAL

## 2023-04-05 LAB — ALP SERPL-CCNC: 107 U/L (ref 35–104)

## 2023-04-05 RX ORDER — LIDOCAINE/PRILOCAINE 2.5 %-2.5%
CREAM (GRAM) TOPICAL
Qty: 30 G | Refills: 1 | Status: SHIPPED | OUTPATIENT
Start: 2023-04-05 | End: 2023-08-11

## 2023-04-05 NOTE — RESULT ENCOUNTER NOTE
Arlyn Rossi,    Your blood work has shown elevated dsDNA but better than before.  CRP is high which is likely related to acute illness that you had, probably a viral infection.  Kidney tests, CBC is normal.  One of the liver test ALT is mildly elevated.  It can be seen in fatty liver or medication side effect.  Recommend avoiding excessive alcohol or more than 2 g Tylenol or NSAIDs.  Your liver enzymes can be followed over next few months, if it does not improve then can refer you to hepatology.    Felicita Herrera MD

## 2023-04-05 NOTE — RESULT ENCOUNTER NOTE
Alkaline phosphatase is not significantly elevated to raise concern.  No change in management is required    Felicita Herrera MD

## 2023-04-05 NOTE — TELEPHONE ENCOUNTER
Spoke with: [place]  Call attempt: 1  Message left: No  Any pain: Yes. Still has some pain present, but it has been decreasing. Over the counter pain medication takes the edge off. Port was assessed in ED on 4/3 and found to be in working order without infection  Any fever: No  Any redness/swelling/ abnormal drainage around puncture site: No  Were you instructed well enough to take care of yourself at home: Yes  Are you satisfied with the care you received: Yes  Any additional concerns or questions: No    Post call completed.   April 5, 2023 1:46 PM  mukesh bartlett RN

## 2023-04-17 ENCOUNTER — INFUSION THERAPY VISIT (OUTPATIENT)
Dept: INFUSION THERAPY | Facility: CLINIC | Age: 38
End: 2023-04-17
Attending: STUDENT IN AN ORGANIZED HEALTH CARE EDUCATION/TRAINING PROGRAM
Payer: COMMERCIAL

## 2023-04-17 VITALS
RESPIRATION RATE: 16 BRPM | OXYGEN SATURATION: 99 % | HEART RATE: 84 BPM | SYSTOLIC BLOOD PRESSURE: 117 MMHG | TEMPERATURE: 98.6 F | DIASTOLIC BLOOD PRESSURE: 76 MMHG

## 2023-04-17 DIAGNOSIS — M32.9 SYSTEMIC LUPUS ERYTHEMATOSUS, UNSPECIFIED SLE TYPE, UNSPECIFIED ORGAN INVOLVEMENT STATUS (H): Primary | ICD-10-CM

## 2023-04-17 PROCEDURE — 96368 THER/DIAG CONCURRENT INF: CPT | Performed by: NURSE PRACTITIONER

## 2023-04-17 PROCEDURE — 96413 CHEMO IV INFUSION 1 HR: CPT | Performed by: NURSE PRACTITIONER

## 2023-04-17 PROCEDURE — 99207 PR NO CHARGE LOS: CPT

## 2023-04-17 RX ORDER — DIPHENHYDRAMINE HYDROCHLORIDE 50 MG/ML
50 INJECTION INTRAMUSCULAR; INTRAVENOUS
Status: CANCELLED
Start: 2023-05-05

## 2023-04-17 RX ORDER — HEPARIN SODIUM (PORCINE) LOCK FLUSH IV SOLN 100 UNIT/ML 100 UNIT/ML
5 SOLUTION INTRAVENOUS
Status: CANCELLED | OUTPATIENT
Start: 2023-05-05

## 2023-04-17 RX ORDER — ALBUTEROL SULFATE 90 UG/1
1-2 AEROSOL, METERED RESPIRATORY (INHALATION)
Status: CANCELLED
Start: 2023-05-05

## 2023-04-17 RX ORDER — ACETAMINOPHEN 325 MG/1
650 TABLET ORAL ONCE
Status: CANCELLED
Start: 2023-05-05

## 2023-04-17 RX ORDER — EPINEPHRINE 1 MG/ML
0.3 INJECTION, SOLUTION INTRAMUSCULAR; SUBCUTANEOUS EVERY 5 MIN PRN
Status: CANCELLED | OUTPATIENT
Start: 2023-05-05

## 2023-04-17 RX ORDER — METHYLPREDNISOLONE SODIUM SUCCINATE 125 MG/2ML
125 INJECTION, POWDER, LYOPHILIZED, FOR SOLUTION INTRAMUSCULAR; INTRAVENOUS
Status: CANCELLED
Start: 2023-05-05

## 2023-04-17 RX ORDER — HEPARIN SODIUM (PORCINE) LOCK FLUSH IV SOLN 100 UNIT/ML 100 UNIT/ML
5 SOLUTION INTRAVENOUS
Status: DISCONTINUED | OUTPATIENT
Start: 2023-04-17 | End: 2023-04-17 | Stop reason: HOSPADM

## 2023-04-17 RX ORDER — HEPARIN SODIUM,PORCINE 10 UNIT/ML
5 VIAL (ML) INTRAVENOUS
Status: CANCELLED | OUTPATIENT
Start: 2023-05-05

## 2023-04-17 RX ORDER — MEPERIDINE HYDROCHLORIDE 25 MG/ML
25 INJECTION INTRAMUSCULAR; INTRAVENOUS; SUBCUTANEOUS EVERY 30 MIN PRN
Status: CANCELLED | OUTPATIENT
Start: 2023-05-05

## 2023-04-17 RX ORDER — ALBUTEROL SULFATE 0.83 MG/ML
2.5 SOLUTION RESPIRATORY (INHALATION)
Status: CANCELLED | OUTPATIENT
Start: 2023-05-05

## 2023-04-17 RX ORDER — DIPHENHYDRAMINE HCL 25 MG
25 CAPSULE ORAL ONCE
Status: CANCELLED
Start: 2023-05-05

## 2023-04-17 RX ORDER — ACETAMINOPHEN 325 MG/1
650 TABLET ORAL ONCE
Status: COMPLETED | OUTPATIENT
Start: 2023-04-17 | End: 2023-04-17

## 2023-04-17 RX ADMIN — HEPARIN SODIUM (PORCINE) LOCK FLUSH IV SOLN 100 UNIT/ML 5 ML: 100 SOLUTION at 16:35

## 2023-04-17 RX ADMIN — Medication 250 ML: at 15:35

## 2023-04-17 RX ADMIN — ACETAMINOPHEN 650 MG: 325 TABLET ORAL at 15:09

## 2023-04-17 NOTE — PROGRESS NOTES
Infusion Nursing Note:  Dasia Nguyen presents today for Arvin.    Patient seen by provider today: No   present during visit today: Not Applicable.    Note: Patient reports no complications with previous infusions. First time port being accessed today. Discussed sterile procedure with patient. Tolerated port access well.      Intravenous Access:  Implanted Port.    Treatment Conditions:  ~~~ NOTE: If the patient answers yes to any of the questions below, hold the infusion and contact ordering provider or on-call provider.    1. Have you recently had an elevated temperature, fever, chills, productive cough, coughing for 3 weeks or longer or hemoptysis,  abnormal vital signs, night sweats,  chest pain or have you noticed a decrease in your appetite, unexplained weight loss or fatigue? No  2. Do you have any open wounds or new incisions? No  3. Do you have any upcoming hospitalizations or surgeries? Does not include esophagogastroduodenoscopy, colonoscopy, endoscopic retrograde cholangiopancreatography (ERCP), endoscopic ultrasound (EUS), dental procedures or joint aspiration/steroid injections No  4. Do you currently have any signs of illness or infection or are you on any antibiotics? No  5. Have you had any new, sudden or worsening abdominal pain? No  6. Have you or anyone in your household received a live vaccination in the past 4 weeks? Please note: No live vaccines while on biologic/chemotherapy until 6 months after the last treatment. Patient can receive the flu vaccine (shot only), pneumovax and the Covid vaccine. It is optimal for the patient to get these vaccines mid cycle, but they can be given at any time as long as it is not on the day of the infusion. No  7. Have you recently been diagnosed with any new nervous system diseases (ie. Multiple sclerosis, Guillain [place], seizures, neurological changes) or cancer diagnosis? Are you on any form of radiation or chemotherapy? No  8. Are you  pregnant or breast feeding or do you have plans of pregnancy in the future? No  9. Have you been having any signs of worsening depression or suicidal ideations?  (benlysta only) No  10. Have there been any other new onset medical symptoms? No  11. Have you had any new blood clots? (IVIG only) No      Post Infusion Assessment:  Patient tolerated infusion without incident.  Blood return noted pre and post infusion.  Site patent and intact, free from redness, edema or discomfort.  No evidence of extravasations.  Access discontinued per protocol.  Biologic Infusion Post Education: Call the triage nurse at your clinic or seek medical attention if you have chills and/or temperature greater than or equal to 100.5, uncontrolled nausea/vomiting, diarrhea, constipation, dizziness, shortness of breath, chest pain, heart palpitations, weakness or any other new or concerning symptoms, questions or concerns.  You cannot have any live virus vaccines prior to or during treatment or up to 6 months post infusion.  If you have an upcoming surgery, medical procedure or dental procedure during treatment, this should be discussed with your ordering physician and your surgeon/dentist.  If you are having any concerning symptom, if you are unsure if you should get your next infusion or wish to speak to a provider before your next infusion, please call your care coordinator or triage nurse at your clinic to notify them so we can adequately serve you.       Discharge Plan:   AVS to patient via [facility].  Patient will return 5/19 for next appointment.   Patient discharged in stable condition accompanied by: self.  Departure Mode: Ambulatory.      Shahla Jasso RN

## 2023-05-19 ENCOUNTER — INFUSION THERAPY VISIT (OUTPATIENT)
Dept: INFUSION THERAPY | Facility: CLINIC | Age: 38
End: 2023-05-19
Attending: STUDENT IN AN ORGANIZED HEALTH CARE EDUCATION/TRAINING PROGRAM
Payer: COMMERCIAL

## 2023-05-19 VITALS
TEMPERATURE: 98 F | DIASTOLIC BLOOD PRESSURE: 79 MMHG | HEART RATE: 73 BPM | BODY MASS INDEX: 32.48 KG/M2 | OXYGEN SATURATION: 98 % | SYSTOLIC BLOOD PRESSURE: 115 MMHG | WEIGHT: 207.4 LBS | RESPIRATION RATE: 16 BRPM

## 2023-05-19 DIAGNOSIS — M32.9 SYSTEMIC LUPUS ERYTHEMATOSUS, UNSPECIFIED SLE TYPE, UNSPECIFIED ORGAN INVOLVEMENT STATUS (H): Primary | ICD-10-CM

## 2023-05-19 PROCEDURE — 96367 TX/PROPH/DG ADDL SEQ IV INF: CPT | Performed by: INTERNAL MEDICINE

## 2023-05-19 PROCEDURE — 99207 PR NO CHARGE LOS: CPT

## 2023-05-19 PROCEDURE — 96413 CHEMO IV INFUSION 1 HR: CPT | Performed by: INTERNAL MEDICINE

## 2023-05-19 RX ORDER — DIPHENHYDRAMINE HCL 25 MG
25 CAPSULE ORAL ONCE
Status: CANCELLED
Start: 2023-06-12

## 2023-05-19 RX ORDER — ACETAMINOPHEN 325 MG/1
650 TABLET ORAL ONCE
Status: CANCELLED
Start: 2023-06-12

## 2023-05-19 RX ORDER — HEPARIN SODIUM (PORCINE) LOCK FLUSH IV SOLN 100 UNIT/ML 100 UNIT/ML
5 SOLUTION INTRAVENOUS
Status: DISCONTINUED | OUTPATIENT
Start: 2023-05-19 | End: 2023-05-19 | Stop reason: HOSPADM

## 2023-05-19 RX ORDER — ALBUTEROL SULFATE 90 UG/1
1-2 AEROSOL, METERED RESPIRATORY (INHALATION)
Status: CANCELLED
Start: 2023-06-12

## 2023-05-19 RX ORDER — EPINEPHRINE 1 MG/ML
0.3 INJECTION, SOLUTION INTRAMUSCULAR; SUBCUTANEOUS EVERY 5 MIN PRN
Status: CANCELLED | OUTPATIENT
Start: 2023-06-12

## 2023-05-19 RX ORDER — MEPERIDINE HYDROCHLORIDE 25 MG/ML
25 INJECTION INTRAMUSCULAR; INTRAVENOUS; SUBCUTANEOUS EVERY 30 MIN PRN
Status: CANCELLED | OUTPATIENT
Start: 2023-06-12

## 2023-05-19 RX ORDER — HEPARIN SODIUM,PORCINE 10 UNIT/ML
5 VIAL (ML) INTRAVENOUS
Status: CANCELLED | OUTPATIENT
Start: 2023-06-12

## 2023-05-19 RX ORDER — ACETAMINOPHEN 325 MG/1
650 TABLET ORAL ONCE
Status: COMPLETED | OUTPATIENT
Start: 2023-05-19 | End: 2023-05-19

## 2023-05-19 RX ORDER — ALBUTEROL SULFATE 0.83 MG/ML
2.5 SOLUTION RESPIRATORY (INHALATION)
Status: CANCELLED | OUTPATIENT
Start: 2023-06-12

## 2023-05-19 RX ORDER — HEPARIN SODIUM (PORCINE) LOCK FLUSH IV SOLN 100 UNIT/ML 100 UNIT/ML
5 SOLUTION INTRAVENOUS
Status: CANCELLED | OUTPATIENT
Start: 2023-06-12

## 2023-05-19 RX ORDER — DIPHENHYDRAMINE HYDROCHLORIDE 50 MG/ML
50 INJECTION INTRAMUSCULAR; INTRAVENOUS
Status: CANCELLED
Start: 2023-06-12

## 2023-05-19 RX ORDER — METHYLPREDNISOLONE SODIUM SUCCINATE 125 MG/2ML
125 INJECTION, POWDER, LYOPHILIZED, FOR SOLUTION INTRAMUSCULAR; INTRAVENOUS
Status: CANCELLED
Start: 2023-06-12

## 2023-05-19 RX ADMIN — Medication 250 ML: at 13:02

## 2023-05-19 RX ADMIN — HEPARIN SODIUM (PORCINE) LOCK FLUSH IV SOLN 100 UNIT/ML 5 ML: 100 SOLUTION at 14:36

## 2023-05-19 RX ADMIN — ACETAMINOPHEN 650 MG: 325 TABLET ORAL at 13:01

## 2023-05-19 ASSESSMENT — PAIN SCALES - GENERAL: PAINLEVEL: MODERATE PAIN (4)

## 2023-05-19 NOTE — PROGRESS NOTES
Infusion Nursing Note:  Dasia Nguyen presents today for monthly Benlysta.    Patient seen by provider today: No   present during visit today: Not Applicable.    Note: Pt states she is having nerve pain up her neck and isn't sure if it is related to the port. Port in good position with 3 points noted and very good blood return noted upon accessing. Pt denies pain during infusion. Pt denies any new symptoms since last infusion.    Intravenous Access:  Implanted Port.    Treatment Conditions:  Biological Infusion Checklist:  ~~~ NOTE: If the patient answers yes to any of the questions below, hold the infusion and contact ordering provider or on-call provider.    1. Have you recently had an elevated temperature, fever, chills, productive cough, coughing for 3 weeks or longer or hemoptysis, abnormal vital signs, night sweats,  chest pain or have you noticed a decrease in your appetite, unexplained weight loss or fatigue? No  2. Do you have any open wounds or new incisions? No  3. Do you have any recent or upcoming hospitalizations, surgeries or dental procedures? No  4. Do you currently have or recently have had any signs of illness or infection or are you on any antibiotics? No  5. Have you had any new, sudden or worsening abdominal pain? No  6. Have you or anyone in your household received a live vaccination in the past 4 weeks? Please note:  No live vaccines while on biologic/chemotherapy until 6 months after the last treatment.  Patient can receive the flu vaccine (shot only) and the pneumovax.  It is optimal for the patient to get these vaccines mid cycle, but they can be given at any time as long as it is not on the day of the infusion. No  7. Have you recently been diagnosed with any new nervous system diseases (ie. Multiple sclerosis, Guillain [place], seizures, neurological changes) or cancer diagnosis? No  8. Are you on any form of radiation or chemotherapy? No  9. Are you pregnant or breast  feeding or do you have plans of pregnancy in the future? No  10. Have you been having any signs of worsening depression or suicidal ideations?  (benlysta only) No  11. Have there been any other new onset medical symptoms? No    Post Infusion Assessment:  Patient tolerated infusion without incident.  Site patent and intact, free from redness, edema or discomfort.  No evidence of extravasations.  Access discontinued per protocol.  Biologic Infusion Post Education: Call the triage nurse at your clinic or seek medical attention if you have chills and/or temperature greater than or equal to 100.5, uncontrolled nausea/vomiting, diarrhea, constipation, dizziness, shortness of breath, chest pain, heart palpitations, weakness or any other new or concerning symptoms, questions or concerns.  You cannot have any live virus vaccines prior to or during treatment or up to 6 months post infusion.  If you have an upcoming surgery, medical procedure or dental procedure during treatment, this should be discussed with your ordering physician and your surgeon/dentist.  If you are having any concerning symptom, if you are unsure if you should get your next infusion or wish to speak to a provider before your next infusion, please call your care coordinator or triage nurse at your clinic to notify them so we can adequately serve you.       Discharge Plan:   AVS to patient via [facility].  Patient will return 6/16/23 for next appointment. Future appts have been reviewed and crosschecked with appt note and plan.  Patient discharged in stable condition accompanied by: self.  Departure Mode: Ambulatory.      Marci Stevens RN

## 2023-06-16 ENCOUNTER — INFUSION THERAPY VISIT (OUTPATIENT)
Dept: INFUSION THERAPY | Facility: CLINIC | Age: 38
End: 2023-06-16
Attending: STUDENT IN AN ORGANIZED HEALTH CARE EDUCATION/TRAINING PROGRAM
Payer: COMMERCIAL

## 2023-06-16 VITALS
HEART RATE: 74 BPM | WEIGHT: 207.9 LBS | BODY MASS INDEX: 32.56 KG/M2 | OXYGEN SATURATION: 100 % | TEMPERATURE: 97.6 F | RESPIRATION RATE: 16 BRPM | SYSTOLIC BLOOD PRESSURE: 109 MMHG | DIASTOLIC BLOOD PRESSURE: 72 MMHG

## 2023-06-16 DIAGNOSIS — M32.9 SYSTEMIC LUPUS ERYTHEMATOSUS, UNSPECIFIED SLE TYPE, UNSPECIFIED ORGAN INVOLVEMENT STATUS (H): Primary | ICD-10-CM

## 2023-06-16 PROCEDURE — 99207 PR NO CHARGE LOS: CPT

## 2023-06-16 PROCEDURE — 96367 TX/PROPH/DG ADDL SEQ IV INF: CPT | Performed by: INTERNAL MEDICINE

## 2023-06-16 PROCEDURE — 96413 CHEMO IV INFUSION 1 HR: CPT | Performed by: INTERNAL MEDICINE

## 2023-06-16 RX ORDER — MEPERIDINE HYDROCHLORIDE 25 MG/ML
25 INJECTION INTRAMUSCULAR; INTRAVENOUS; SUBCUTANEOUS EVERY 30 MIN PRN
Status: CANCELLED | OUTPATIENT
Start: 2023-07-14

## 2023-06-16 RX ORDER — HEPARIN SODIUM,PORCINE 10 UNIT/ML
5 VIAL (ML) INTRAVENOUS
Status: CANCELLED | OUTPATIENT
Start: 2023-07-14

## 2023-06-16 RX ORDER — HEPARIN SODIUM (PORCINE) LOCK FLUSH IV SOLN 100 UNIT/ML 100 UNIT/ML
5 SOLUTION INTRAVENOUS
Status: DISCONTINUED | OUTPATIENT
Start: 2023-06-16 | End: 2023-06-16 | Stop reason: HOSPADM

## 2023-06-16 RX ORDER — DIPHENHYDRAMINE HYDROCHLORIDE 50 MG/ML
50 INJECTION INTRAMUSCULAR; INTRAVENOUS
Status: CANCELLED
Start: 2023-07-14

## 2023-06-16 RX ORDER — METHYLPREDNISOLONE SODIUM SUCCINATE 125 MG/2ML
125 INJECTION, POWDER, LYOPHILIZED, FOR SOLUTION INTRAMUSCULAR; INTRAVENOUS
Status: CANCELLED
Start: 2023-07-14

## 2023-06-16 RX ORDER — ALBUTEROL SULFATE 90 UG/1
1-2 AEROSOL, METERED RESPIRATORY (INHALATION)
Status: CANCELLED
Start: 2023-07-14

## 2023-06-16 RX ORDER — ALBUTEROL SULFATE 0.83 MG/ML
2.5 SOLUTION RESPIRATORY (INHALATION)
Status: CANCELLED | OUTPATIENT
Start: 2023-07-14

## 2023-06-16 RX ORDER — HEPARIN SODIUM (PORCINE) LOCK FLUSH IV SOLN 100 UNIT/ML 100 UNIT/ML
5 SOLUTION INTRAVENOUS
Status: CANCELLED | OUTPATIENT
Start: 2023-07-14

## 2023-06-16 RX ORDER — EPINEPHRINE 1 MG/ML
0.3 INJECTION, SOLUTION INTRAMUSCULAR; SUBCUTANEOUS EVERY 5 MIN PRN
Status: CANCELLED | OUTPATIENT
Start: 2023-07-14

## 2023-06-16 RX ORDER — ACETAMINOPHEN 325 MG/1
650 TABLET ORAL ONCE
Status: COMPLETED | OUTPATIENT
Start: 2023-06-16 | End: 2023-06-16

## 2023-06-16 RX ORDER — ACETAMINOPHEN 325 MG/1
650 TABLET ORAL ONCE
Status: CANCELLED
Start: 2023-07-14

## 2023-06-16 RX ORDER — DIPHENHYDRAMINE HCL 25 MG
25 CAPSULE ORAL ONCE
Status: CANCELLED
Start: 2023-07-14

## 2023-06-16 RX ADMIN — Medication 250 ML: at 11:28

## 2023-06-16 RX ADMIN — ACETAMINOPHEN 650 MG: 325 TABLET ORAL at 11:32

## 2023-06-16 RX ADMIN — HEPARIN SODIUM (PORCINE) LOCK FLUSH IV SOLN 100 UNIT/ML 5 ML: 100 SOLUTION at 13:01

## 2023-06-16 ASSESSMENT — PAIN SCALES - GENERAL: PAINLEVEL: NO PAIN (0)

## 2023-06-16 NOTE — PROGRESS NOTES
Infusion Nursing Note:  Dasia Nguyen presents today for Benlysta.    Patient seen by provider today: No   present during visit today: Not Applicable.    Note: The patient reports feeling at her baseline today and denies any concerns at this time.      Intravenous Access:  Implanted Port.    Treatment Conditions:  Biological Infusion Checklist:  ~~~ NOTE: If the patient answers yes to any of the questions below, hold the infusion and contact ordering provider or on-call provider.    1. Have you recently had an elevated temperature, fever, chills, productive cough, coughing for 3 weeks or longer or hemoptysis,  abnormal vital signs, night sweats,  chest pain or have you noticed a decrease in your appetite, unexplained weight loss or fatigue? No  2. Do you have any open wounds or new incisions? No  3. Do you have any upcoming hospitalizations or surgeries? Does not include esophagogastroduodenoscopy, colonoscopy, endoscopic retrograde cholangiopancreatography (ERCP), endoscopic ultrasound (EUS), dental procedures or joint aspiration/steroid injections No  4. Do you currently have any signs of illness or infection or are you on any antibiotics? No  5. Have you had any new, sudden or worsening abdominal pain? No  6. Have you or anyone in your household received a live vaccination in the past 4 weeks? Please note: No live vaccines while on biologic/chemotherapy until 6 months after the last treatment. Patient can receive the flu vaccine (shot only), pneumovax and the Covid vaccine. It is optimal for the patient to get these vaccines mid cycle, but they can be given at any time as long as it is not on the day of the infusion. No  7. Have you recently been diagnosed with any new nervous system diseases (ie. Multiple sclerosis, Guillain [place], seizures, neurological changes) or cancer diagnosis? Are you on any form of radiation or chemotherapy? No  8. Are you pregnant or breast feeding or do you have plans of  pregnancy in the future? No  9. Have you been having any signs of worsening depression or suicidal ideations?  (benlysta only) No  10. Have there been any other new onset medical symptoms? No  11. Have you had any new blood clots? (IVIG only) No        Post Infusion Assessment:  Patient tolerated infusion without incident.  Blood return noted pre and post infusion.  Site patent and intact, free from redness, edema or discomfort.  No evidence of extravasations.  Access discontinued per protocol.  Biologic Infusion Post Education: Call the triage nurse at your clinic or seek medical attention if you have chills and/or temperature greater than or equal to 100.5, uncontrolled nausea/vomiting, diarrhea, constipation, dizziness, shortness of breath, chest pain, heart palpitations, weakness or any other new or concerning symptoms, questions or concerns.  You cannot have any live virus vaccines prior to or during treatment or up to 6 months post infusion.  If you have an upcoming surgery, medical procedure or dental procedure during treatment, this should be discussed with your ordering physician and your surgeon/dentist.  If you are having any concerning symptom, if you are unsure if you should get your next infusion or wish to speak to a provider before your next infusion, please call your care coordinator or triage nurse at your clinic to notify them so we can adequately serve you.       Discharge Plan:   AVS to patient via [employer].  Patient will return 7/14/23 for next appointment. Future appts have been reviewed and crosschecked with appt note and plan.   Patient discharged in stable condition accompanied by: self.  Departure Mode: Ambulatory.      Davina Glasgow RN

## 2023-07-14 ENCOUNTER — INFUSION THERAPY VISIT (OUTPATIENT)
Dept: INFUSION THERAPY | Facility: CLINIC | Age: 38
End: 2023-07-14
Attending: STUDENT IN AN ORGANIZED HEALTH CARE EDUCATION/TRAINING PROGRAM
Payer: COMMERCIAL

## 2023-07-14 VITALS
HEART RATE: 84 BPM | SYSTOLIC BLOOD PRESSURE: 116 MMHG | RESPIRATION RATE: 16 BRPM | OXYGEN SATURATION: 98 % | DIASTOLIC BLOOD PRESSURE: 77 MMHG | TEMPERATURE: 98.6 F

## 2023-07-14 DIAGNOSIS — M32.9 SYSTEMIC LUPUS ERYTHEMATOSUS, UNSPECIFIED SLE TYPE, UNSPECIFIED ORGAN INVOLVEMENT STATUS (H): Primary | ICD-10-CM

## 2023-07-14 PROCEDURE — 96413 CHEMO IV INFUSION 1 HR: CPT | Performed by: INTERNAL MEDICINE

## 2023-07-14 PROCEDURE — 96375 TX/PRO/DX INJ NEW DRUG ADDON: CPT | Performed by: INTERNAL MEDICINE

## 2023-07-14 PROCEDURE — 99207 PR NO CHARGE LOS: CPT

## 2023-07-14 RX ORDER — EPINEPHRINE 1 MG/ML
0.3 INJECTION, SOLUTION INTRAMUSCULAR; SUBCUTANEOUS EVERY 5 MIN PRN
Status: CANCELLED | OUTPATIENT
Start: 2023-08-11

## 2023-07-14 RX ORDER — MEPERIDINE HYDROCHLORIDE 25 MG/ML
25 INJECTION INTRAMUSCULAR; INTRAVENOUS; SUBCUTANEOUS EVERY 30 MIN PRN
Status: CANCELLED | OUTPATIENT
Start: 2023-08-11

## 2023-07-14 RX ORDER — ALBUTEROL SULFATE 90 UG/1
1-2 AEROSOL, METERED RESPIRATORY (INHALATION)
Status: CANCELLED
Start: 2023-08-11

## 2023-07-14 RX ORDER — HEPARIN SODIUM (PORCINE) LOCK FLUSH IV SOLN 100 UNIT/ML 100 UNIT/ML
5 SOLUTION INTRAVENOUS
Status: DISCONTINUED | OUTPATIENT
Start: 2023-07-14 | End: 2023-07-14 | Stop reason: HOSPADM

## 2023-07-14 RX ORDER — ALBUTEROL SULFATE 0.83 MG/ML
2.5 SOLUTION RESPIRATORY (INHALATION)
Status: CANCELLED | OUTPATIENT
Start: 2023-08-11

## 2023-07-14 RX ORDER — ACETAMINOPHEN 325 MG/1
650 TABLET ORAL ONCE
Status: CANCELLED
Start: 2023-08-11

## 2023-07-14 RX ORDER — ACETAMINOPHEN 325 MG/1
650 TABLET ORAL ONCE
Status: COMPLETED | OUTPATIENT
Start: 2023-07-14 | End: 2023-07-14

## 2023-07-14 RX ORDER — HEPARIN SODIUM (PORCINE) LOCK FLUSH IV SOLN 100 UNIT/ML 100 UNIT/ML
5 SOLUTION INTRAVENOUS
Status: CANCELLED | OUTPATIENT
Start: 2023-08-11

## 2023-07-14 RX ORDER — DIPHENHYDRAMINE HCL 25 MG
25 CAPSULE ORAL ONCE
Status: CANCELLED
Start: 2023-08-11

## 2023-07-14 RX ORDER — METHYLPREDNISOLONE SODIUM SUCCINATE 125 MG/2ML
125 INJECTION, POWDER, LYOPHILIZED, FOR SOLUTION INTRAMUSCULAR; INTRAVENOUS
Status: CANCELLED
Start: 2023-08-11

## 2023-07-14 RX ORDER — DIPHENHYDRAMINE HYDROCHLORIDE 50 MG/ML
50 INJECTION INTRAMUSCULAR; INTRAVENOUS
Status: CANCELLED
Start: 2023-08-11

## 2023-07-14 RX ORDER — HEPARIN SODIUM,PORCINE 10 UNIT/ML
5 VIAL (ML) INTRAVENOUS
Status: CANCELLED | OUTPATIENT
Start: 2023-08-11

## 2023-07-14 RX ADMIN — ACETAMINOPHEN 650 MG: 325 TABLET ORAL at 08:16

## 2023-07-14 RX ADMIN — Medication 250 ML: at 08:21

## 2023-07-14 RX ADMIN — HEPARIN SODIUM (PORCINE) LOCK FLUSH IV SOLN 100 UNIT/ML 5 ML: 100 SOLUTION at 09:50

## 2023-07-14 NOTE — PROGRESS NOTES
Infusion Nursing Note:  Dasia Nguyen presents today for Bernylysta.    Patient seen by provider today: No   present during visit today: Not Applicable.    Note: N/A.      Intravenous Access:  Implanted Port.    Treatment Conditions:  Biological Infusion Checklist:  ~~~ NOTE: If the patient answers yes to any of the questions below, hold the infusion and contact ordering provider or on-call provider.    1. Have you recently had an elevated temperature, fever, chills, productive cough, coughing for 3 weeks or longer or hemoptysis,  abnormal vital signs, night sweats,  chest pain or have you noticed a decrease in your appetite, unexplained weight loss or fatigue? No  2. Do you have any open wounds or new incisions? No  3. Do you have any upcoming hospitalizations or surgeries? Does not include esophagogastroduodenoscopy, colonoscopy, endoscopic retrograde cholangiopancreatography (ERCP), endoscopic ultrasound (EUS), dental procedures or joint aspiration/steroid injections No  4. Do you currently have any signs of illness or infection or are you on any antibiotics? No  5. Have you had any new, sudden or worsening abdominal pain? NO  6. Have you or anyone in your household received a live vaccination in the past 4 weeks? Please note: No live vaccines while on biologic/chemotherapy until 6 months after the last treatment. Patient can receive the flu vaccine (shot only), pneumovax and the Covid vaccine. It is optimal for the patient to get these vaccines mid cycle, but they can be given at any time as long as it is not on the day of the infusion. No  7. Have you recently been diagnosed with any new nervous system diseases (ie. Multiple sclerosis, Guillain [place], seizures, neurological changes) or cancer diagnosis? Are you on any form of radiation or chemotherapy? No  8. Are you pregnant or breast feeding or do you have plans of pregnancy in the future? No  9. Have you been having any signs of worsening  depression or suicidal ideations?  (benlysta only) No  10. Have there been any other new onset medical symptoms? No  11. Have you had any new blood clots? (IVIG only) N/A        Post Infusion Assessment:  Patient tolerated infusion without incident.  Blood return noted pre and post infusion.  No evidence of extravasations.  Access discontinued per protocol.  Biologic Infusion Post Education: Call the triage nurse at your clinic or seek medical attention if you have chills and/or temperature greater than or equal to 100.5, uncontrolled nausea/vomiting, diarrhea, constipation, dizziness, shortness of breath, chest pain, heart palpitations, weakness or any other new or concerning symptoms, questions or concerns.  You cannot have any live virus vaccines prior to or during treatment or up to 6 months post infusion.  If you have an upcoming surgery, medical procedure or dental procedure during treatment, this should be discussed with your ordering physician and your surgeon/dentist.  If you are having any concerning symptom, if you are unsure if you should get your next infusion or wish to speak to a provider before your next infusion, please call your care coordinator or triage nurse at your clinic to notify them so we can adequately serve you.       Discharge Plan:   Discharge instructions reviewed with: Patient.  Patient and/or family verbalized understanding of discharge instructions and all questions answered.  Patient discharged in stable condition accompanied by: self.  Departure Mode: Ambulatory.      Armida Nguyen RN

## 2023-08-03 ENCOUNTER — VIRTUAL VISIT (OUTPATIENT)
Dept: RHEUMATOLOGY | Facility: CLINIC | Age: 38
End: 2023-08-03
Payer: COMMERCIAL

## 2023-08-03 ENCOUNTER — LAB (OUTPATIENT)
Dept: LAB | Facility: OTHER | Age: 38
End: 2023-08-03
Payer: COMMERCIAL

## 2023-08-03 DIAGNOSIS — M32.9 SYSTEMIC LUPUS ERYTHEMATOSUS, UNSPECIFIED SLE TYPE, UNSPECIFIED ORGAN INVOLVEMENT STATUS (H): ICD-10-CM

## 2023-08-03 DIAGNOSIS — M32.9 SYSTEMIC LUPUS ERYTHEMATOSUS, UNSPECIFIED SLE TYPE, UNSPECIFIED ORGAN INVOLVEMENT STATUS (H): Primary | ICD-10-CM

## 2023-08-03 DIAGNOSIS — R79.89 ELEVATED LFTS: ICD-10-CM

## 2023-08-03 LAB
ALBUMIN MFR UR ELPH: 10.1 MG/DL
ALBUMIN SERPL BCG-MCNC: 4.3 G/DL (ref 3.5–5.2)
ALBUMIN UR-MCNC: NEGATIVE MG/DL
ALT SERPL W P-5'-P-CCNC: 100 U/L (ref 0–50)
APPEARANCE UR: ABNORMAL
AST SERPL W P-5'-P-CCNC: 61 U/L (ref 0–45)
BACTERIA #/AREA URNS HPF: ABNORMAL /HPF
BILIRUB UR QL STRIP: NEGATIVE
CK SERPL-CCNC: 58 U/L (ref 26–192)
COLOR UR AUTO: YELLOW
CREAT SERPL-MCNC: 0.81 MG/DL (ref 0.51–0.95)
CREAT UR-MCNC: 198.8 MG/DL
CRP SERPL-MCNC: 6.46 MG/L
ERYTHROCYTE [DISTWIDTH] IN BLOOD BY AUTOMATED COUNT: 12.4 % (ref 10–15)
ERYTHROCYTE [SEDIMENTATION RATE] IN BLOOD BY WESTERGREN METHOD: 21 MM/HR (ref 0–20)
GFR SERPL CREATININE-BSD FRML MDRD: >90 ML/MIN/1.73M2
GLUCOSE UR STRIP-MCNC: NEGATIVE MG/DL
HCT VFR BLD AUTO: 40.9 % (ref 35–47)
HGB BLD-MCNC: 13.6 G/DL (ref 11.7–15.7)
HGB UR QL STRIP: NEGATIVE
KETONES UR STRIP-MCNC: NEGATIVE MG/DL
LEUKOCYTE ESTERASE UR QL STRIP: ABNORMAL
MCH RBC QN AUTO: 28.5 PG (ref 26.5–33)
MCHC RBC AUTO-ENTMCNC: 33.3 G/DL (ref 31.5–36.5)
MCV RBC AUTO: 86 FL (ref 78–100)
MUCOUS THREADS #/AREA URNS LPF: PRESENT /LPF
NITRATE UR QL: NEGATIVE
PH UR STRIP: 8.5 [PH] (ref 5–7)
PLATELET # BLD AUTO: 363 10E3/UL (ref 150–450)
PROT/CREAT 24H UR: 0.05 MG/MG CR (ref 0–0.2)
RBC # BLD AUTO: 4.78 10E6/UL (ref 3.8–5.2)
RBC #/AREA URNS AUTO: ABNORMAL /HPF
SP GR UR STRIP: 1.02 (ref 1–1.03)
SQUAMOUS #/AREA URNS AUTO: ABNORMAL /LPF
TSH SERPL DL<=0.005 MIU/L-ACNC: 0.87 UIU/ML (ref 0.3–4.2)
UROBILINOGEN UR STRIP-ACNC: 0.2 E.U./DL
WBC # BLD AUTO: 6.6 10E3/UL (ref 4–11)
WBC #/AREA URNS AUTO: ABNORMAL /HPF

## 2023-08-03 PROCEDURE — 86160 COMPLEMENT ANTIGEN: CPT

## 2023-08-03 PROCEDURE — 86160 COMPLEMENT ANTIGEN: CPT | Mod: 59

## 2023-08-03 PROCEDURE — 82550 ASSAY OF CK (CPK): CPT

## 2023-08-03 PROCEDURE — 84443 ASSAY THYROID STIM HORMONE: CPT

## 2023-08-03 PROCEDURE — 84156 ASSAY OF PROTEIN URINE: CPT

## 2023-08-03 PROCEDURE — 84450 TRANSFERASE (AST) (SGOT): CPT

## 2023-08-03 PROCEDURE — 81001 URINALYSIS AUTO W/SCOPE: CPT

## 2023-08-03 PROCEDURE — 86140 C-REACTIVE PROTEIN: CPT

## 2023-08-03 PROCEDURE — 86381 MITOCHONDRIAL ANTIBODY EACH: CPT

## 2023-08-03 PROCEDURE — 82040 ASSAY OF SERUM ALBUMIN: CPT

## 2023-08-03 PROCEDURE — 36415 COLL VENOUS BLD VENIPUNCTURE: CPT

## 2023-08-03 PROCEDURE — 82565 ASSAY OF CREATININE: CPT

## 2023-08-03 PROCEDURE — 99213 OFFICE O/P EST LOW 20 MIN: CPT | Mod: 93 | Performed by: STUDENT IN AN ORGANIZED HEALTH CARE EDUCATION/TRAINING PROGRAM

## 2023-08-03 PROCEDURE — 86225 DNA ANTIBODY NATIVE: CPT

## 2023-08-03 PROCEDURE — 85027 COMPLETE CBC AUTOMATED: CPT

## 2023-08-03 PROCEDURE — 85652 RBC SED RATE AUTOMATED: CPT

## 2023-08-03 PROCEDURE — 84460 ALANINE AMINO (ALT) (SGPT): CPT

## 2023-08-03 NOTE — PATIENT INSTRUCTIONS
Continue Plaquenil 400 mg daily     Continue Benlysta infusions every 4 weeks     Blood tests orders placed     Follow up in 6 months with labs.

## 2023-08-03 NOTE — RESULT ENCOUNTER NOTE
Arlyn Rossi,     Your LFT are elevated. Inflammatory markers are better. Kidney tests and CBC is normal. Urine sample most likely is contaminated. I have added tests to check for autoimmune hepatitis. But for abnormal LFT's I will refer you to Hepatology. I will hold off on increasing Benlysta infusions at present.     Felicita Herrera MD

## 2023-08-03 NOTE — PROGRESS NOTES
Telephone visit     Rheumatology Visit     Dasia Nguyen MRN# [id number]   YOB: 1985 Age: 36 year old     Date of Visit: Aug 3, 2023   Primary care provider: Kaushik Luna          Assessment and Plan:     Assessment     Systemic lupus - ( cutaneous lupus, arthralgia, oral sores, photosensitivity, +dsDNA )  Facial rash - cutaneous lupus - seen dermatologist  Recurrent oral sores - resolved on Benlysta  Chest pain  Fatigue  Headaches  Neg AYAN, RF, ACPA  +dsDNA - 11, Neg SSA/SSB, centromere, Scl-70, Sm.     Ms. Nguyen is 38 year old female seen for management of systemic lupus.    Positive dsDNA : For the last 2 years she has multiple symptoms including fatigue, joint pains, facial rash, photosensitivity, chest pain, recurrent oral sores. Her autoimmune work up showed negative AYAN but had mildly elevated dsDNA.  Her facial rash was diagnosed as acute cutaneous lupus by her dermatologist. On exam she had mild tenderness over MCP, PIP joints, ankles and MTP joints but no synovitis noted.     She was started on Plaquenil in 12/2021 and it helped little. Her repeat autoimmune serologies in 7/2022 showed elevated dsDNA of 18, normal C3/C4, AYAN, CBC, CMP. Due to worsening symptoms she was started on Methotrexate and was up to 6 tab once a week, but developed elevated LFT's and was discontinued.     Benlysta infusions were started in 12/2022. She has noted 70 % improvement after starting Infusions. Facial rash, oral sores, joint pains have reduced. Will continue Benlysta infusions and Plaquenil.  Her updated labs done today have shown elevated LFTs, normal creatinine, CBC.  CRP has improved.  Sed rate is stable.  I will put in hepatology referral.  F-actin, antimitochondrial antibody, CK levels ordered.    She thinks that benlysta effect wears off in 3 weeks and was wondering if getting infusions more often like every 3 weeks will be helpful.  I will hold off on increasing Benlysta infusion frequency  due to abnormal LFTs.  Will get dsDNA, complement levels to evaluate for lupus activity.      Seizure like episodes : Seen by Neurology, and EEG is normal. Her seizures are not related to Epilepsy.  She uses as needed Benadryl for seizure-like episodes and it helps.      Plan    Continue Plaquenil 400 mg daily     Continue Benlysta infusions every 4 weeks     Blood tests orders placed     Follow up in 6 months with labs.     -- Orders placed this encounter  Orders Placed This Encounter   Procedures    TSH with free T4 reflex    Protein  random urine       TT 30 min was spent on date of the encounter doing chart review, history and exam, documentation and further activities as noted above. Any prior notes, outside records, laboratory results, and imaging studies were reviewed if relevant.    Patient verbalized agreement with and understanding of the rationale for the diagnosis and treatment plan.  All questions were answered to best of my ability and the patient's satisfaction.      Chart documentation done in part with Dragon Voice recognition Software. Although reviewed after completion, some word and grammatical error may remain.                  Active Problem List:     Patient Active Problem List    Diagnosis Date Noted    Systemic lupus erythematosus (H) 08/06/2022     Priority: Medium    Chest pain on breathing 11/24/2021     Priority: Medium    Fatigue 11/24/2021     Priority: Medium    Multiple joint pain 11/24/2021     Priority: Medium    Photosensitivity 11/24/2021     Priority: Medium            History of Present Illness:   Dasia Nguyen is a 38 year old female with PMH of facial rash, joint pains, chest pain, +dsdna seen in the clinic for management of systemic lupus.     History from initial visit  : She was in her usual state of health up until 3 years ago when she started noticing fatigue, joint pains, on and off chest pains. Her symptoms came as flare ups when all her symptoms would increase many  folds. Her last severe flare was summer 2021 She had severe body aches, joint pains and could not get out of bed for couple weeks. In addition she was extremely fatigued and had headaches, dizziness, chest pains and oral sores. She also noticed being very photosensitive and developed facial rash. She saw dermatologist at [facility] who diagnosed her with acute cutaneous lupus.     Beginning May 2021, she started having seizures like episodes at night. She describes it as hot tingling sensation and her body will shake for 15 - 20 sec. She has fallen out of bed during those episodes. She was seen by Neurologist and had EEG which did not show epilepsy. MRI head, C-spine, T-spine did not show demyelinated lesions. She was referred to cardiology to make sure that her symptoms are not related to Arrhythmias. Echocardiogram was normal. CT chest showed some calcification in the region of LAD.     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 Her ankles will hurt. In the morning she does not have strength to grasp things. She gets pain in her wrists, elbows. She has fatigue and feel she has flu every day. She has headaches every day. She takes naps during the day. Her chest hurt, feel as if someone is stepping on it. She always take shallow breaths. She was given medrol dos russ and responded well to it.     She has herniated discs in her back and always had chronic low back pain.    She gets recurrent UTI and possibly had kidney stones.     Autoimmune work up showed Neg AYAN, KIANNA, RF, ACPA but positive dsDNA. She had mildly elevated ESR of 26 in 6/2021.   Denies any raynauds, sicca symptoms, recurrent sinusitis/rhinitis, swallowing difficulty, hearing or visual changes recently. No h/o arterial/venous thrombosis in the past. Hx of one 5 week pregnancy loss.    July 6, 2022 - Plaquenil has helped with her facial rash. 2 months ago she started having seizures. When she is on flare up she gets  seizures 5 hours straight. Whole body is so tired, she is sleeping 13 hours a day. She has had EEG which was normal. She was told to take Benadryl when it will happen.  She is shaking, she does not have control, hitting herself. She has overall feeling of being sick, can not get out of bed.  She has 5 kids. She has feeling of being sick all the time. She has pain in her joints. She gets pain in her thumb joints, pinky, big toes. She has noticed that when she is very tired she gets flutters in her chest. She gets oral sores, big ones on the inner lip, right nostril. She has tried prednisone and felt that it helped with inflammation, joint aches. Every couple months she gets very sick. When she is walking a lot she gets swelling and puffiness in her ankles. She takes Ibuprofen as needed.   September 8, 2022 - She has not had facial rash and oral sores in a while. She feel that her fingers are not hurting. She feel that her chest is very tight. She is on Methotrexate 6 tab once a week for 7 weeks. She is on Plaquenil 200 mg twice daily. She is off of prednisone. She still get seizure like symptoms and use Benadryl as needed for these symptoms. She gets numbness in her arms when she lie down.   March 24, 2023 - She was throwing up all night last night. If she is laying on her back then she gets Abdominal pain and nausea. She is on Benlysta infusions monthly since 12/2022 and Plaquenil 400 mg daily for SLE. She has overall noticed improvement on Benlysta. She has more good days than bad days. Facial rash has resolved. A month ago she had flare up and had facial rash as well. Oral sores have reduced in frequency. She has scheduled appointment for eye exam in April 2023.  August 3, 2023 - She feel very fatigued and gets flu like illness.  Joints in her toes ache. Benlysta has helped with her joint pains. After her last infusion she was very sick and had couple days of seizures like episode. She was very tired, nauseated  and sick. She does not get mouth ulcers now on Benlysta. Malar rash has resolved on her face. When she is sun, photosensitivity has reduced on Benlysta. She gets hot flashes and palpitations sometimes. She is on Plaquenil 400 mg daily and is due for HC[facility] eye exam. She takes Benadryl as needed for seizures.          Review of Systems:     Review Of Systems  Constitutional: denies fever, chills, night sweats and weight loss.  Skin: + skin rash.  Eyes: No dryness or irritation in eyes. No episode of eye inflammation or redness.   Ears/Nose/Throat: no recurrent sinus infections.  Respiratory: No shortness of breath, dyspnea on exertion, cough, or hemoptysis  Cardiovascular: + chest pain or palpitations.  Gastrointestinal: no nausea, vomiting, abdominal pain.  Normal bowel movements.  Genitourinary: no dysuria, frequency  or hematuria.  Musculoskeletal: as in HPI  Neurologic: no numbness, tingling.  Psychiatric: no mood disorders.  Hematologic/Lymphatic/Immunologic: no history of easy bruising, petechia or purpura.  No abnormal bleeding.   Endocrine: no h/o thyroid disease or Diabetes.                  Past Medical History:     Past Medical History:   Diagnosis Date    Chest pain on breathing     Fatigue     Multiple joint pain     Photosensitivity      Past Surgical History:   Procedure Laterality Date    IR CHEST PORT PLACEMENT > 5 YRS OF AGE  3/31/2023    NO HISTORY OF SURGERY              Social History:     Social History     Occupational History    Not on file   Tobacco Use    Smoking status: Never    Smokeless tobacco: Never   Substance and Sexual Activity    Alcohol use: Not Currently    Drug use: Never    Sexual activity: Not on file            Family History:     Family History   Problem Relation Age of Onset    Lupus Maternal Uncle     Sarcoidosis Maternal Aunt             Allergies:     Allergies   Allergen Reactions    Other Environmental Allergy Swelling     Cat & long haired dog dander - eyes swell, hives,  airway gets tight - has never needed Epi Pen            Medications:     Current Outpatient Medications   Medication Sig Dispense Refill    Belimumab (BENLYSTA IV)       hydroxychloroquine (PLAQUENIL) 200 MG tablet Take 1 tablet (200 mg) by mouth 2 times daily 180 tablet 1    Multiple Vitamin (MULTIVITAMINS PO)       diphenhydrAMINE (BENADRYL) 25 MG tablet Take 2-4 tablets by mouth As needed for seizure      lidocaine-prilocaine (EMLA) 2.5-2.5 % external cream Apply 1 hour prior to port access (Patient not taking: Reported on 8/3/2023) 30 g 1    magic mouthwash (ENTER INGREDIENTS IN COMMENTS) suspension Guafenesin - 70cc  2% viscous lidocaine - 30cc  Diphenhydramine (12.5/5cc) - 200cc  Nystatin - 30 cc  Sucralfate - 100cc  Maalox - 50cc  Disp: 480 cc(16 oz)  Sig: Use 3 tsp.(15cc), t.i.d. Swish for 3 mins, gargle and expectorate for 2 weeks. Then use prn for maintenance. 480 mL 1            Physical Exam:   There were no vitals taken for this visit.  Wt Readings from Last 4 Encounters:   06/16/23 94.3 kg (207 lb 14.4 oz)   05/19/23 94.1 kg (207 lb 6.4 oz)   03/31/23 77.1 kg (170 lb)   03/10/23 93.9 kg (207 lb)     Not done on telephone visit            Data:     Results for orders placed or performed in visit on 08/03/23   CBC with platelets     Status: Normal   Result Value Ref Range    WBC Count 6.6 4.0 - 11.0 10e3/uL    RBC Count 4.78 3.80 - 5.20 10e6/uL    Hemoglobin 13.6 11.7 - 15.7 g/dL    Hematocrit 40.9 35.0 - 47.0 %    MCV 86 78 - 100 fL    MCH 28.5 26.5 - 33.0 pg    MCHC 33.3 31.5 - 36.5 g/dL    RDW 12.4 10.0 - 15.0 %    Platelet Count 363 150 - 450 10e3/uL   AST     Status: Abnormal   Result Value Ref Range    AST 61 (H) 0 - 45 U/L   ALT     Status: Abnormal   Result Value Ref Range     (H) 0 - 50 U/L   Albumin level     Status: Normal   Result Value Ref Range    Albumin 4.3 3.5 - 5.2 g/dL   Creatinine     Status: Normal   Result Value Ref Range    Creatinine 0.81 0.51 - 0.95 mg/dL    GFR Estimate  >90 >60 mL/min/1.73m2   CRP inflammation     Status: Abnormal   Result Value Ref Range    CRP Inflammation 6.46 (H) <5.00 mg/L   Erythrocyte sedimentation rate auto     Status: Abnormal   Result Value Ref Range    Erythrocyte Sedimentation Rate 21 (H) 0 - 20 mm/hr   UA with Microscopic     Status: Abnormal   Result Value Ref Range    Color Urine Yellow Colorless, Straw, Light Yellow, Yellow    Appearance Urine Slightly Cloudy (A) Clear    Glucose Urine Negative Negative mg/dL    Bilirubin Urine Negative Negative    Ketones Urine Negative Negative mg/dL    Specific Gravity Urine 1.020 1.003 - 1.035    Blood Urine Negative Negative    pH Urine 8.5 (H) 5.0 - 7.0    Protein Albumin Urine Negative Negative mg/dL    Urobilinogen Urine 0.2 0.2, 1.0 E.U./dL    Nitrite Urine Negative Negative    Leukocyte Esterase Urine Trace (A) Negative   TSH with free T4 reflex     Status: Normal   Result Value Ref Range    TSH 0.87 0.30 - 4.20 uIU/mL   Protein  random urine     Status: None   Result Value Ref Range    Total Protein Urine mg/dL 10.1   mg/dL    Total Protein UR MG/MG CR 0.05 0.00 - 0.20 mg/mg Cr    Creatinine Urine mg/dL 198.8 mg/dL   Urine Microscopic Exam     Status: Abnormal   Result Value Ref Range    Bacteria Urine Moderate (A) None Seen /HPF    RBC Urine 0-2 0-2 /HPF /HPF    WBC Urine 0-5 0-5 /HPF /HPF    Squamous Epithelials Urine Moderate (A) None Seen /LPF    Mucus Urine Present (A) None Seen /LPF       Hemoglobin   Date Value Ref Range Status   08/03/2023 13.6 11.7 - 15.7 g/dL Final   03/24/2023 13.8 11.7 - 15.7 g/dL Final   09/08/2022 13.2 11.7 - 15.7 g/dL Final     Urea Nitrogen   Date Value Ref Range Status   07/06/2022 12 7 - 30 mg/dL Final   11/24/2021 13 7 - 30 mg/dL Final     Erythrocyte Sedimentation Rate   Date Value Ref Range Status   08/03/2023 21 (H) 0 - 20 mm/hr Final   03/24/2023 18 0 - 20 mm/hr Final   09/08/2022 18 0 - 20 mm/hr Final     CRP Inflammation   Date Value Ref Range Status   11/24/2021  3.1 0.0 - 8.0 mg/L Final     AST   Date Value Ref Range Status   08/03/2023 61 (H) 0 - 45 U/L Final     Comment:     Reference intervals for this test were updated on 6/12/2023 to more accurately reflect our healthy population. There may be differences in the flagging of prior results with similar values performed with this method. Interpretation of those prior results can be made in the context of the updated reference intervals.   03/24/2023 33 10 - 35 U/L Final   02/10/2023 26 0 - 45 U/L Final     Albumin   Date Value Ref Range Status   08/03/2023 4.3 3.5 - 5.2 g/dL Final   03/24/2023 4.0 3.5 - 5.2 g/dL Final   02/10/2023 3.8 3.4 - 5.0 g/dL Final   09/08/2022 4.1 3.4 - 5.0 g/dL Final   07/06/2022 3.8 3.4 - 5.0 g/dL Final     Alkaline Phosphatase   Date Value Ref Range Status   04/04/2023 107 (H) 35 - 104 U/L Final   02/10/2023 112 40 - 150 U/L Final   07/06/2022 89 40 - 150 U/L Final     ALT   Date Value Ref Range Status   08/03/2023 100 (H) 0 - 50 U/L Final     Comment:     Reference intervals for this test were updated on 6/12/2023 to more accurately reflect our healthy population. There may be differences in the flagging of prior results with similar values performed with this method. Interpretation of those prior results can be made in the context of the updated reference intervals.     03/24/2023 59 (H) 10 - 35 U/L Final   02/10/2023 57 (H) 0 - 50 U/L Final     Rheumatoid Factor   Date Value Ref Range Status   11/24/2021 <7 <12 IU/mL Final     Recent Labs   Lab Test 08/03/23  1035 04/04/23  1645 03/24/23  1246 02/10/23  0800 09/08/22  1111 07/06/22  1153 11/24/21  1239   WBC 6.6  --  5.7  --  6.0 6.2 7.1   HGB 13.6  --  13.8  --  13.2 13.6 14.1   HCT 40.9  --  40.8  --  39.9 40.4 42.3   MCV 86  --  85  --  88 85 87     --  318  --  359 314 395   BUN  --   --   --   --   --  12 13   TSH 0.87  --   --   --   --   --   --    AST 61*  --  33 26 20 10 18   *  --  59* 57* 61* 22 33   ALKPHOS  --   107*  --  112  --  89 106        Outside studies reviewed: Results from [facility] reviewed     Reviewed Rheumatology lab flowsheet    Felicita Herrera MD  [facility]  Department of Rheumatology & Autoimmune Disorders  [employer] [place]: [phone]   Pager - [phone]      Telephone visit : 25 mins.

## 2023-08-04 LAB
C3 SERPL-MCNC: 167 MG/DL (ref 81–157)
C4 SERPL-MCNC: 43 MG/DL (ref 13–39)
DSDNA AB SER-ACNC: 7.2 IU/ML
MITOCHONDRIA M2 IGG SER-ACNC: 1.1 U/ML

## 2023-08-09 ENCOUNTER — TELEPHONE (OUTPATIENT)
Dept: RHEUMATOLOGY | Facility: CLINIC | Age: 38
End: 2023-08-09

## 2023-08-09 ENCOUNTER — LAB (OUTPATIENT)
Dept: LAB | Facility: CLINIC | Age: 38
End: 2023-08-09
Payer: COMMERCIAL

## 2023-08-09 DIAGNOSIS — M32.9 SYSTEMIC LUPUS ERYTHEMATOSUS, UNSPECIFIED SLE TYPE, UNSPECIFIED ORGAN INVOLVEMENT STATUS (H): ICD-10-CM

## 2023-08-09 PROCEDURE — 99000 SPECIMEN HANDLING OFFICE-LAB: CPT

## 2023-08-09 PROCEDURE — 83516 IMMUNOASSAY NONANTIBODY: CPT | Mod: 90

## 2023-08-09 PROCEDURE — 36415 COLL VENOUS BLD VENIPUNCTURE: CPT

## 2023-08-09 NOTE — TELEPHONE ENCOUNTER
Additional lab test for autoimmune hepatitis was not added on and cannot be added.  Patient will have to be redrawn.  Patient called and aware.  She isn't able to see GI until 2/5/24.  Is it ok that she wait this long?  Saw her PCP for various s/s as discussed at last appt (extreme fatigue, N/V, feeling  flu like, heavy chest).  Reports mouth sores and joint pain is better. Feels great for 2 weeks after her Benlysta, but next 2 weeks are a struggle.  Based on additional liver test result, patient is wondering if you would increase Benylsta interval or call GI to get her in sooner?      Dang Lopez RN

## 2023-08-09 NOTE — TELEPHONE ENCOUNTER
----- Message from Felicita Herrera MD sent at 8/3/2023  4:10 PM CDT -----  Arlyn Rossi,     Your LFT are elevated. Inflammatory markers are better. Kidney tests and CBC is normal. Urine sample most likely is contaminated. I have added tests to check for autoimmune hepatitis. But for abnormal LFT's I will refer you to Hepatology. I will hold off on increasing Benlysta infusions at present.     Felicita Herrera MD

## 2023-08-11 ENCOUNTER — INFUSION THERAPY VISIT (OUTPATIENT)
Dept: INFUSION THERAPY | Facility: CLINIC | Age: 38
End: 2023-08-11
Payer: COMMERCIAL

## 2023-08-11 VITALS
HEART RATE: 79 BPM | SYSTOLIC BLOOD PRESSURE: 110 MMHG | TEMPERATURE: 98 F | DIASTOLIC BLOOD PRESSURE: 76 MMHG | BODY MASS INDEX: 32.84 KG/M2 | WEIGHT: 209.7 LBS | RESPIRATION RATE: 14 BRPM | OXYGEN SATURATION: 97 %

## 2023-08-11 DIAGNOSIS — M32.9 SYSTEMIC LUPUS ERYTHEMATOSUS, UNSPECIFIED SLE TYPE, UNSPECIFIED ORGAN INVOLVEMENT STATUS (H): Primary | ICD-10-CM

## 2023-08-11 LAB — SMA IGG SER-ACNC: 6 UNITS

## 2023-08-11 PROCEDURE — 96367 TX/PROPH/DG ADDL SEQ IV INF: CPT | Performed by: INTERNAL MEDICINE

## 2023-08-11 PROCEDURE — 96365 THER/PROPH/DIAG IV INF INIT: CPT | Performed by: INTERNAL MEDICINE

## 2023-08-11 RX ORDER — HEPARIN SODIUM (PORCINE) LOCK FLUSH IV SOLN 100 UNIT/ML 100 UNIT/ML
5 SOLUTION INTRAVENOUS
Status: CANCELLED | OUTPATIENT
Start: 2023-09-08

## 2023-08-11 RX ORDER — ALBUTEROL SULFATE 0.83 MG/ML
2.5 SOLUTION RESPIRATORY (INHALATION)
Status: CANCELLED | OUTPATIENT
Start: 2023-09-08

## 2023-08-11 RX ORDER — ALBUTEROL SULFATE 90 UG/1
1-2 AEROSOL, METERED RESPIRATORY (INHALATION)
Status: CANCELLED
Start: 2023-09-08

## 2023-08-11 RX ORDER — DIPHENHYDRAMINE HCL 25 MG
25 CAPSULE ORAL ONCE
Status: CANCELLED
Start: 2023-09-08

## 2023-08-11 RX ORDER — MEPERIDINE HYDROCHLORIDE 25 MG/ML
25 INJECTION INTRAMUSCULAR; INTRAVENOUS; SUBCUTANEOUS EVERY 30 MIN PRN
Status: CANCELLED | OUTPATIENT
Start: 2023-09-08

## 2023-08-11 RX ORDER — HEPARIN SODIUM (PORCINE) LOCK FLUSH IV SOLN 100 UNIT/ML 100 UNIT/ML
5 SOLUTION INTRAVENOUS
Status: DISCONTINUED | OUTPATIENT
Start: 2023-08-11 | End: 2023-08-11 | Stop reason: HOSPADM

## 2023-08-11 RX ORDER — ACETAMINOPHEN 325 MG/1
650 TABLET ORAL ONCE
Status: CANCELLED
Start: 2023-09-08

## 2023-08-11 RX ORDER — METHYLPREDNISOLONE SODIUM SUCCINATE 125 MG/2ML
125 INJECTION, POWDER, LYOPHILIZED, FOR SOLUTION INTRAMUSCULAR; INTRAVENOUS
Status: CANCELLED
Start: 2023-09-08

## 2023-08-11 RX ORDER — ACETAMINOPHEN 325 MG/1
650 TABLET ORAL ONCE
Status: COMPLETED | OUTPATIENT
Start: 2023-08-11 | End: 2023-08-11

## 2023-08-11 RX ORDER — EPINEPHRINE 1 MG/ML
0.3 INJECTION, SOLUTION INTRAMUSCULAR; SUBCUTANEOUS EVERY 5 MIN PRN
Status: CANCELLED | OUTPATIENT
Start: 2023-09-08

## 2023-08-11 RX ORDER — DIPHENHYDRAMINE HYDROCHLORIDE 50 MG/ML
50 INJECTION INTRAMUSCULAR; INTRAVENOUS
Status: CANCELLED
Start: 2023-09-08

## 2023-08-11 RX ORDER — HEPARIN SODIUM,PORCINE 10 UNIT/ML
5 VIAL (ML) INTRAVENOUS
Status: CANCELLED | OUTPATIENT
Start: 2023-09-08

## 2023-08-11 RX ADMIN — HEPARIN SODIUM (PORCINE) LOCK FLUSH IV SOLN 100 UNIT/ML 5 ML: 100 SOLUTION at 09:53

## 2023-08-11 RX ADMIN — ACETAMINOPHEN 650 MG: 325 TABLET ORAL at 08:31

## 2023-08-11 RX ADMIN — Medication 250 ML: at 08:29

## 2023-08-11 NOTE — PROGRESS NOTES
Infusion Nursing Note:  Dasia Nguyen presents today for Benlysta infusion.    Patient seen by provider today: No   present during visit today: Not Applicable.    Note:   Patient states her abdomen is distended/uncomfortable due to retaining water from elevated liver enzymes. Patient had labs drawn 8/9/23 to assess if she has autoimmune hepatitis.  Results are pending.    Intravenous Access:  Implanted Port.    Treatment Conditions:  Biological Infusion Checklist:  ~~~ NOTE: If the patient answers yes to any of the questions below, hold the infusion and contact ordering provider or on-call provider.    Have you recently had an elevated temperature, fever, chills, productive cough, coughing for 3 weeks or longer or hemoptysis,  abnormal vital signs, night sweats,  chest pain or have you noticed a decrease in your appetite, unexplained weight loss or fatigue? No  Do you have any open wounds or new incisions? No  Do you have any upcoming hospitalizations or surgeries? Does not include esophagogastroduodenoscopy, colonoscopy, endoscopic retrograde cholangiopancreatography (ERCP), endoscopic ultrasound (EUS), dental procedures or joint aspiration/steroid injections No  Do you currently have any signs of illness or infection or are you on any antibiotics? No  Have you had any new, sudden or worsening abdominal pain? No  Have you or anyone in your household received a live vaccination in the past 4 weeks? Please note: No live vaccines while on biologic/chemotherapy until 6 months after the last treatment. Patient can receive the flu vaccine (shot only), pneumovax and the Covid vaccine. It is optimal for the patient to get these vaccines mid cycle, but they can be given at any time as long as it is not on the day of the infusion. No  Have you recently been diagnosed with any new nervous system diseases (ie. Multiple sclerosis, Guillain [place], seizures, neurological changes) or cancer diagnosis? Are you on any  form of radiation or chemotherapy? No  Are you pregnant or breast feeding or do you have plans of pregnancy in the future? No  Have you been having any signs of worsening depression or suicidal ideations?  (benlysta only) No  Have there been any other new onset medical symptoms? No    Post Infusion Assessment:  Patient tolerated infusion without incident.  Blood return noted pre and post infusion.  Site patent and intact, free from redness, edema or discomfort.  No evidence of extravasations.  Access discontinued per protocol.       Discharge Plan:   AVS to patient via MYCHART.  Patient will return 9/8/23 for next appointment.   Patient discharged in stable condition accompanied by: self.  Departure Mode: Ambulatory.      Cynthia Beltran RN

## 2023-09-08 ENCOUNTER — INFUSION THERAPY VISIT (OUTPATIENT)
Dept: INFUSION THERAPY | Facility: CLINIC | Age: 38
End: 2023-09-08
Payer: COMMERCIAL

## 2023-09-08 VITALS
RESPIRATION RATE: 16 BRPM | DIASTOLIC BLOOD PRESSURE: 72 MMHG | SYSTOLIC BLOOD PRESSURE: 105 MMHG | TEMPERATURE: 98.3 F | OXYGEN SATURATION: 97 % | HEART RATE: 81 BPM

## 2023-09-08 DIAGNOSIS — M32.9 SYSTEMIC LUPUS ERYTHEMATOSUS, UNSPECIFIED SLE TYPE, UNSPECIFIED ORGAN INVOLVEMENT STATUS (H): Primary | ICD-10-CM

## 2023-09-08 PROCEDURE — 96365 THER/PROPH/DIAG IV INF INIT: CPT | Performed by: INTERNAL MEDICINE

## 2023-09-08 PROCEDURE — 96367 TX/PROPH/DG ADDL SEQ IV INF: CPT | Performed by: INTERNAL MEDICINE

## 2023-09-08 RX ORDER — DIPHENHYDRAMINE HCL 25 MG
25 CAPSULE ORAL ONCE
Status: CANCELLED
Start: 2023-10-06

## 2023-09-08 RX ORDER — DIPHENHYDRAMINE HYDROCHLORIDE 50 MG/ML
50 INJECTION INTRAMUSCULAR; INTRAVENOUS
Status: CANCELLED
Start: 2023-10-06

## 2023-09-08 RX ORDER — ACETAMINOPHEN 325 MG/1
650 TABLET ORAL ONCE
Status: COMPLETED | OUTPATIENT
Start: 2023-09-08 | End: 2023-09-08

## 2023-09-08 RX ORDER — MEPERIDINE HYDROCHLORIDE 25 MG/ML
25 INJECTION INTRAMUSCULAR; INTRAVENOUS; SUBCUTANEOUS EVERY 30 MIN PRN
Status: CANCELLED | OUTPATIENT
Start: 2023-10-06

## 2023-09-08 RX ORDER — HEPARIN SODIUM (PORCINE) LOCK FLUSH IV SOLN 100 UNIT/ML 100 UNIT/ML
5 SOLUTION INTRAVENOUS
Status: CANCELLED | OUTPATIENT
Start: 2023-10-06

## 2023-09-08 RX ORDER — EPINEPHRINE 1 MG/ML
0.3 INJECTION, SOLUTION INTRAMUSCULAR; SUBCUTANEOUS EVERY 5 MIN PRN
Status: CANCELLED | OUTPATIENT
Start: 2023-10-06

## 2023-09-08 RX ORDER — ACETAMINOPHEN 325 MG/1
650 TABLET ORAL ONCE
Status: CANCELLED
Start: 2023-10-06

## 2023-09-08 RX ORDER — ALBUTEROL SULFATE 0.83 MG/ML
2.5 SOLUTION RESPIRATORY (INHALATION)
Status: CANCELLED | OUTPATIENT
Start: 2023-10-06

## 2023-09-08 RX ORDER — HEPARIN SODIUM (PORCINE) LOCK FLUSH IV SOLN 100 UNIT/ML 100 UNIT/ML
5 SOLUTION INTRAVENOUS
Status: DISCONTINUED | OUTPATIENT
Start: 2023-09-08 | End: 2023-09-08 | Stop reason: HOSPADM

## 2023-09-08 RX ORDER — HEPARIN SODIUM,PORCINE 10 UNIT/ML
5 VIAL (ML) INTRAVENOUS
Status: CANCELLED | OUTPATIENT
Start: 2023-10-06

## 2023-09-08 RX ORDER — METHYLPREDNISOLONE SODIUM SUCCINATE 125 MG/2ML
125 INJECTION, POWDER, LYOPHILIZED, FOR SOLUTION INTRAMUSCULAR; INTRAVENOUS
Status: CANCELLED
Start: 2023-10-06

## 2023-09-08 RX ORDER — ALBUTEROL SULFATE 90 UG/1
1-2 AEROSOL, METERED RESPIRATORY (INHALATION)
Status: CANCELLED
Start: 2023-10-06

## 2023-09-08 RX ADMIN — Medication 250 ML: at 08:22

## 2023-09-08 RX ADMIN — ACETAMINOPHEN 650 MG: 325 TABLET ORAL at 08:24

## 2023-09-08 RX ADMIN — HEPARIN SODIUM (PORCINE) LOCK FLUSH IV SOLN 100 UNIT/ML 5 ML: 100 SOLUTION at 09:49

## 2023-09-08 ASSESSMENT — PAIN SCALES - GENERAL: PAINLEVEL: MODERATE PAIN (5)

## 2023-09-08 NOTE — PROGRESS NOTES
Infusion Nursing Note:  Dasia Nguyen presents today for Benlysta.    Patient seen by provider today: No   present during visit today: Not Applicable.    Note: The patient reports feeling at her baseline and has ongoing fatigue and joint pain at a 5/10.     Intravenous Access:  Implanted Port.    Treatment Conditions:  Biological Infusion Checklist:  ~~~ NOTE: If the patient answers yes to any of the questions below, hold the infusion and contact ordering provider or on-call provider.    Have you recently had an elevated temperature, fever, chills, productive cough, coughing for 3 weeks or longer or hemoptysis,  abnormal vital signs, night sweats,  chest pain or have you noticed a decrease in your appetite, unexplained weight loss or fatigue? No  Do you have any open wounds or new incisions? No  Do you have any upcoming hospitalizations or surgeries? Does not include esophagogastroduodenoscopy, colonoscopy, endoscopic retrograde cholangiopancreatography (ERCP), endoscopic ultrasound (EUS), dental procedures or joint aspiration/steroid injections No  Do you currently have any signs of illness or infection or are you on any antibiotics? No  Have you had any new, sudden or worsening abdominal pain? No  Have you or anyone in your household received a live vaccination in the past 4 weeks? Please note: No live vaccines while on biologic/chemotherapy until 6 months after the last treatment. Patient can receive the flu vaccine (shot only), pneumovax and the Covid vaccine. It is optimal for the patient to get these vaccines mid cycle, but they can be given at any time as long as it is not on the day of the infusion. No  Have you recently been diagnosed with any new nervous system diseases (ie. Multiple sclerosis, Guillain [place], seizures, neurological changes) or cancer diagnosis? Are you on any form of radiation or chemotherapy? No  Are you pregnant or breast feeding or do you have plans of pregnancy in the  future? No  Have you been having any signs of worsening depression or suicidal ideations?  (benlysta only) No  Have there been any other new onset medical symptoms? No  Have you had any new blood clots? (IVIG only) No      Post Infusion Assessment:  Patient tolerated infusion without incident.  Blood return noted pre and post infusion.  Site patent and intact, free from redness, edema or discomfort.  No evidence of extravasations.  Access discontinued per protocol.  Biologic Infusion Post Education: Call the triage nurse at your clinic or seek medical attention if you have chills and/or temperature greater than or equal to 100.5, uncontrolled nausea/vomiting, diarrhea, constipation, dizziness, shortness of breath, chest pain, heart palpitations, weakness or any other new or concerning symptoms, questions or concerns.  You cannot have any live virus vaccines prior to or during treatment or up to 6 months post infusion.  If you have an upcoming surgery, medical procedure or dental procedure during treatment, this should be discussed with your ordering physician and your surgeon/dentist.  If you are having any concerning symptom, if you are unsure if you should get your next infusion or wish to speak to a provider before your next infusion, please call your care coordinator or triage nurse at your clinic to notify them so we can adequately serve you.       Discharge Plan:   AVS to patient via TapitHART.  Patient will return 10/6/23 for next appointment.   Patient discharged in stable condition accompanied by: self.  Departure Mode: Ambulatory.      Davina Glasgow RN

## 2023-09-11 NOTE — TELEPHONE ENCOUNTER
RECORDS RECEIVED FROM: Felicita Herrera MD //   Systemic lupus erythematosus, unspecified SLE type, unspecified organ involvement status (H)   Elevated LFTs   Fatty liver      Appt Date: 9/25/2023   NOTES STATUS DETAILS   OFFICE NOTE from referring provider Internal 8/3/2023 VV with RHEUM DB eHrrera   OFFICE NOTES from other specialists N/A    DISCHARGE SUMMARY from hospital N/A    MEDICATION LIST Internal    LIVER BIOSPY (IF APPLICABLE)      PATHOLOGY REPORTS  N/A    IMAGING     ENDOSCOPY (IF AVAILABLE) N/A    COLONOSCOPY (IF AVAILABLE) N/A    ULTRASOUND LIVER N/A    CT OF ABDOMEN Care Everywhere 11/4/2022 [facility]  12/22/2021 - [facility]   MRI OF LIVER N/A    FIBROSCAN, US ELASTOGRAPHY, FIBROSIS SCAN, MR ELASTOGRAPHY N/A    LABS     HEPATIC PANEL (LIVER PANEL) Internal 2/10/2023 - [facility]    BASIC METABOLIC PANEL Care Everywhere 4/24/2022 -    COMPLETE METABOLIC PANEL Care Everywhere 8/8/2023 -    COMPLETE BLOOD COUNT (CBC) Internal 8/3/2023, 3/24/2023 - [facility]    INTERNATIONAL NORMALIZED RATIO (INR) N/A    HEPATITIS C ANTIBODY Internal 7/14/2022 - [facility]    HEPATITIS C VIRAL LOAD/PCR N/A    HEPATITIS C GENOTYPE N/A    HEPATITIS B SURFACE ANTIGEN Internal 7/14/2022 - [facility]    HEPATITIS B SURFACE ANTIBODY Care Everywhere 8/8/2023 -    HEPATITIS B DNA QUANT LEVEL N/A    HEPATITIS B CORE ANTIBODY Care Everywhere/Internal  8/8/2023 -   7/14/2022 [facility]      Request for imaging sent to [facility] fax [phone]  
No

## 2023-09-25 ENCOUNTER — ANCILLARY PROCEDURE (OUTPATIENT)
Dept: ULTRASOUND IMAGING | Facility: CLINIC | Age: 38
End: 2023-09-25
Attending: STUDENT IN AN ORGANIZED HEALTH CARE EDUCATION/TRAINING PROGRAM
Payer: COMMERCIAL

## 2023-09-25 ENCOUNTER — OFFICE VISIT (OUTPATIENT)
Dept: GASTROENTEROLOGY | Facility: CLINIC | Age: 38
End: 2023-09-25
Attending: STUDENT IN AN ORGANIZED HEALTH CARE EDUCATION/TRAINING PROGRAM
Payer: COMMERCIAL

## 2023-09-25 ENCOUNTER — PRE VISIT (OUTPATIENT)
Dept: GASTROENTEROLOGY | Facility: CLINIC | Age: 38
End: 2023-09-25

## 2023-09-25 VITALS
SYSTOLIC BLOOD PRESSURE: 118 MMHG | BODY MASS INDEX: 32.84 KG/M2 | HEART RATE: 87 BPM | OXYGEN SATURATION: 98 % | DIASTOLIC BLOOD PRESSURE: 79 MMHG | HEIGHT: 67 IN

## 2023-09-25 DIAGNOSIS — R79.89 ELEVATED LFTS: ICD-10-CM

## 2023-09-25 DIAGNOSIS — K90.0 CELIAC DISEASE: ICD-10-CM

## 2023-09-25 DIAGNOSIS — M32.9 SYSTEMIC LUPUS ERYTHEMATOSUS, UNSPECIFIED SLE TYPE, UNSPECIFIED ORGAN INVOLVEMENT STATUS (H): ICD-10-CM

## 2023-09-25 DIAGNOSIS — K76.0 HEPATIC STEATOSIS: Primary | ICD-10-CM

## 2023-09-25 DIAGNOSIS — K64.4 EXTERNAL HEMORRHOIDS: ICD-10-CM

## 2023-09-25 LAB
ALBUMIN SERPL BCG-MCNC: 4.2 G/DL (ref 3.5–5.2)
ALP SERPL-CCNC: 111 U/L (ref 35–104)
ALT SERPL W P-5'-P-CCNC: 91 U/L (ref 0–50)
AST SERPL W P-5'-P-CCNC: 46 U/L (ref 0–45)
BILIRUB DIRECT SERPL-MCNC: <0.2 MG/DL (ref 0–0.3)
BILIRUB SERPL-MCNC: 0.3 MG/DL
ERYTHROCYTE [DISTWIDTH] IN BLOOD BY AUTOMATED COUNT: 12.5 % (ref 10–15)
HCT VFR BLD AUTO: 39.2 % (ref 35–47)
HGB BLD-MCNC: 13.2 G/DL (ref 11.7–15.7)
MCH RBC QN AUTO: 29.1 PG (ref 26.5–33)
MCHC RBC AUTO-ENTMCNC: 33.7 G/DL (ref 31.5–36.5)
MCV RBC AUTO: 86 FL (ref 78–100)
PLATELET # BLD AUTO: 358 10E3/UL (ref 150–450)
PROT SERPL-MCNC: 7.2 G/DL (ref 6.4–8.3)
RBC # BLD AUTO: 4.54 10E6/UL (ref 3.8–5.2)
WBC # BLD AUTO: 8.8 10E3/UL (ref 4–11)

## 2023-09-25 PROCEDURE — 86364 TISS TRNSGLTMNASE EA IG CLAS: CPT | Performed by: STUDENT IN AN ORGANIZED HEALTH CARE EDUCATION/TRAINING PROGRAM

## 2023-09-25 PROCEDURE — 80076 HEPATIC FUNCTION PANEL: CPT | Performed by: STUDENT IN AN ORGANIZED HEALTH CARE EDUCATION/TRAINING PROGRAM

## 2023-09-25 PROCEDURE — 99000 SPECIMEN HANDLING OFFICE-LAB: CPT | Performed by: STUDENT IN AN ORGANIZED HEALTH CARE EDUCATION/TRAINING PROGRAM

## 2023-09-25 PROCEDURE — 82784 ASSAY IGA/IGD/IGG/IGM EACH: CPT | Performed by: STUDENT IN AN ORGANIZED HEALTH CARE EDUCATION/TRAINING PROGRAM

## 2023-09-25 PROCEDURE — 85027 COMPLETE CBC AUTOMATED: CPT | Performed by: STUDENT IN AN ORGANIZED HEALTH CARE EDUCATION/TRAINING PROGRAM

## 2023-09-25 PROCEDURE — 36415 COLL VENOUS BLD VENIPUNCTURE: CPT | Performed by: STUDENT IN AN ORGANIZED HEALTH CARE EDUCATION/TRAINING PROGRAM

## 2023-09-25 PROCEDURE — 76705 ECHO EXAM OF ABDOMEN: CPT | Performed by: RADIOLOGY

## 2023-09-25 PROCEDURE — 99204 OFFICE O/P NEW MOD 45 MIN: CPT | Performed by: STUDENT IN AN ORGANIZED HEALTH CARE EDUCATION/TRAINING PROGRAM

## 2023-09-25 PROCEDURE — 83516 IMMUNOASSAY NONANTIBODY: CPT | Mod: 90 | Performed by: STUDENT IN AN ORGANIZED HEALTH CARE EDUCATION/TRAINING PROGRAM

## 2023-09-25 ASSESSMENT — PAIN SCALES - GENERAL: PAINLEVEL: NO PAIN (0)

## 2023-09-25 NOTE — LETTER
9/25/2023         RE: Dasia Nguyen  [address]  [place] [address]        Dear Colleague,    Thank you for referring your patient, Dasia Nguyen, to the [facility]. Please see a copy of my visit note below.    [facility] New Patient Visit    Date of Visit: September 25, 2023    Reason for referral: elevated LFTs    Subjective: Ms. Nguyen is a 38 year old woman with a history of lupus who presents for evaluation of elevated liver enzymes.    Liver enzymes were normal in 2021.  She was on methotrexate for 3 months, her liver enzymes edvin and this this was stopped.  Liver enzymes improved and now has been slightly elevated again.  No history of jaundice.  She did have jaundice as a baby.  She is on belimumab and Plaquenil for her lupus.  Diagnosed with celiac disease based on borderline positive antibody testing in 2021.  Never had an upper endoscopy.  She is gluten-free, feels significant abdominal pain if she eats gluten.  Does not drink alcohol.  Has gained about 10 to 15 pounds since December 2022.    She had hepatitis B and C testing that was negative.  AYAN negative.  Anti-smooth muscle antibody negative.  Antimitochondrial antibody negative.    Has bothersome external hemorrhoids that she talked to her primary care doctor about.    No previous known liver disease, abnormal LFTs, imaging tests. No known history of liver decompensation    ROS: 14 point ROS negative except for positives noted in HPI.    PMHx:  Past Medical History:   Diagnosis Date     Chest pain on breathing      Fatigue      Multiple joint pain      Photosensitivity    Lupus    PSHx:  Past Surgical History:   Procedure Laterality Date     IR CHEST PORT PLACEMENT > 5 YRS OF AGE  3/31/2023     NO HISTORY OF SURGERY         FamHx:  Family History   Problem Relation Age of Onset     Lupus Maternal Uncle      Sarcoidosis Maternal Aunt      No family history of liver disease,  "liver cancer    SocHx:  Social History     Socioeconomic History     Marital status:      Spouse name: Not on file     Number of children: Not on file     Years of education: Not on file     Highest education level: Not on file   Occupational History     Not on file   Tobacco Use     Smoking status: Never     Smokeless tobacco: Never   Vaping Use     Vaping Use: Never used   Substance and Sexual Activity     Alcohol use: Not Currently     Drug use: Never     Sexual activity: Not on file   Other Topics Concern     Not on file   Social History Narrative     Not on file     Social Determinants of Health     Financial Resource Strain: Not on file   Food Insecurity: Not on file   Transportation Needs: Not on file   Physical Activity: Not on file   Stress: Not on file   Social Connections: Not on file   Interpersonal Safety: Not on file   Housing Stability: Not on file       Medications:  Current Outpatient Medications   Medication     Belimumab (BENLYSTA IV)     diphenhydrAMINE (BENADRYL) 25 MG tablet     hydroxychloroquine (PLAQUENIL) 200 MG tablet     magic mouthwash (ENTER INGREDIENTS IN COMMENTS) suspension     Multiple Vitamin (MULTIVITAMINS PO)     No current facility-administered medications for this visit.     No OTCs, herbals    Allergies:  Allergies   Allergen Reactions     Other Environmental Allergy Swelling     Cat & long haired dog dander - eyes swell, hives, airway gets tight - has never needed Epi Pen       Objective:  /79 (BP Location: Left arm, Patient Position: Sitting, Cuff Size: Adult Regular)   Pulse 87   Ht 1.702 m (5' 7\")   SpO2 98%   BMI 32.84 kg/m    Constitutional: pleasant woman in NAD  Eyes: non icteric  Respiratory: Normal respiratory excursion   MSK: normal range of motion of visualized extremities  Abd: Non distended  Skin: No jaundice  Psychiatric: normal mood and orientation    Labs:  Last Comprehensive Metabolic Panel:  Sodium   Date Value Ref Range Status   07/06/2022 " 141 133 - 144 mmol/L Final     Potassium   Date Value Ref Range Status   07/06/2022 4.4 3.4 - 5.3 mmol/L Final     Chloride   Date Value Ref Range Status   07/06/2022 109 94 - 109 mmol/L Final     Carbon Dioxide (CO2)   Date Value Ref Range Status   07/06/2022 29 20 - 32 mmol/L Final     Anion Gap   Date Value Ref Range Status   07/06/2022 3 3 - 14 mmol/L Final     Glucose   Date Value Ref Range Status   02/10/2023 110 (H) 70 - 99 mg/dL Final     Urea Nitrogen   Date Value Ref Range Status   07/06/2022 12 7 - 30 mg/dL Final     Creatinine   Date Value Ref Range Status   08/03/2023 0.81 0.51 - 0.95 mg/dL Final     GFR Estimate   Date Value Ref Range Status   08/03/2023 >90 >60 mL/min/1.73m2 Final     Calcium   Date Value Ref Range Status   07/06/2022 9.7 8.5 - 10.1 mg/dL Final     Bilirubin Total   Date Value Ref Range Status   09/25/2023 0.3 <=1.2 mg/dL Final     Alkaline Phosphatase   Date Value Ref Range Status   09/25/2023 111 (H) 35 - 104 U/L Final     ALT   Date Value Ref Range Status   09/25/2023 91 (H) 0 - 50 U/L Final     Comment:     Reference intervals for this test were updated on 6/12/2023 to more accurately reflect our healthy population. There may be differences in the flagging of prior results with similar values performed with this method. Interpretation of those prior results can be made in the context of the updated reference intervals.       AST   Date Value Ref Range Status   09/25/2023 46 (H) 0 - 45 U/L Final     Comment:     Reference intervals for this test were updated on 6/12/2023 to more accurately reflect our healthy population. There may be differences in the flagging of prior results with similar values performed with this method. Interpretation of those prior results can be made in the context of the updated reference intervals.       Lab Results   Component Value Date    WBC 6.6 08/03/2023     Lab Results   Component Value Date    RBC 4.78 08/03/2023     Lab Results   Component Value  Date    HGB 13.6 08/03/2023     Lab Results   Component Value Date    HCT 40.9 08/03/2023     Lab Results   Component Value Date    MCV 86 08/03/2023     Lab Results   Component Value Date    MCH 28.5 08/03/2023     Lab Results   Component Value Date    MCHC 33.3 08/03/2023     Lab Results   Component Value Date    RDW 12.4 08/03/2023     Lab Results   Component Value Date     08/03/2023       INR   Date Value Ref Range Status   11/24/2021 1.07 0.85 - 1.15 Final        Computed MELD 3.0 unavailable. Necessary lab results were not found in the last year.  Computed MELD-Na unavailable. Necessary lab results were not found in the last year.      Imaging:    CT AP 11/2022    FINDINGS:     Kidneys: No renal or ureteral stone.  No hydronephrosis/hydroureter.  No renal mass, scarring,   or atrophy seen.   Urinary Bladder: Normal.   Liver: Slight diffuse fatty infiltration. No hepatomegaly. No evidence of cirrhosis. No mass   seen.   Gallbladder and Bile Ducts: Normal.   Spleen: Normal.   Pancreas: Normal.   Adrenals: Normal.   Aorta: Normal.   No adenopathy.   No ascites.   No significant  bowel abnormality.  No inflammation associated with large or small bowel.    Normal stomach.   Normal appendix.   No hernia at the umbilicus or elsewhere.   No adnexal lesion.   Negative for fracture or destructive bone lesion.     IMPRESSION:     1. No hernia at the umbilicus, or elsewhere.     2.  Slight diffuse fatty infiltration of the liver without hepatomegaly or hepatic cirrhosis.     3.  No biliary tract abnormality.     4.  No urinary tract abnormality.     5.  No bowel abnormality. Normal stomach. Normal appendix.     6.  No adnexal lesion.       Endoscopy: None    Independently reviewed labs and imaging.     Assessment/Plan: Ms. Nguyen is a 38-year-old woman with a history of lupus who presents for evaluation of elevated liver enzymes.  On review of her imaging she had mild hepatic steatosis fall 2022.  Her liver  enzymes are mild to moderately elevated.  She had serologic work-up that was negative.      She takes Plaquenil, this is not associated with elevated liver enzymes.  She is on belimumab infusions, elevated AST and ALT are uncommon with this.  This can be associated with reactivation of hepatitis B.  Her hepatitis B testing was negative.  I do not think her medications are contributing to her elevated liver enzymes.    Recommend serologic work-up for other causes of elevated liver enzymes.  Her liver enzymes are minimally elevated, would not recommend liver biopsy at this time.  Would continue to monitor and recommend weight loss for steatotic liver disease.    Place referral for colorectal surgery to talk about her external hemorrhoids    Orders Placed This Encounter   Procedures     US Abdomen Limited     Tissue transglutaminase latasha IgA and IgG     Hepatic panel (Albumin, ALT, AST, Bili, Alk Phos, TP)     IgG     F Actin EIA with reflex     CBC with platelets     Adult Comprehensive Weight Management  Referral     Adult Colorectal Surgery  Referral     RTC based on labs    Katherine Villagomez MD MS  Hepatology/Liver Transplant  [facility]        Again, thank you for allowing me to participate in the care of your patient.        Sincerely,        Katherine Villagomez MD

## 2023-09-25 NOTE — PROGRESS NOTES
[facility] Clinic New Patient Visit    Date of Visit: September 25, 2023    Reason for referral: elevated LFTs    Subjective: Ms. Nguyen is a 38 year old woman with a history of lupus who presents for evaluation of elevated liver enzymes.    Liver enzymes were normal in 2021.  She was on methotrexate for 3 months, her liver enzymes edvin and this this was stopped.  Liver enzymes improved and now has been slightly elevated again.  No history of jaundice.  She did have jaundice as a baby.  She is on belimumab and Plaquenil for her lupus.  Diagnosed with celiac disease based on borderline positive antibody testing in 2021.  Never had an upper endoscopy.  She is gluten-free, feels significant abdominal pain if she eats gluten.  Does not drink alcohol.  Has gained about 10 to 15 pounds since December 2022.    She had hepatitis B and C testing that was negative.  AYAN negative.  Anti-smooth muscle antibody negative.  Antimitochondrial antibody negative.    Has bothersome external hemorrhoids that she talked to her primary care doctor about.    No previous known liver disease, abnormal LFTs, imaging tests. No known history of liver decompensation    ROS: 14 point ROS negative except for positives noted in HPI.    PMHx:  Past Medical History:   Diagnosis Date    Chest pain on breathing     Fatigue     Multiple joint pain     Photosensitivity    Lupus    PSHx:  Past Surgical History:   Procedure Laterality Date    IR CHEST PORT PLACEMENT > 5 YRS OF AGE  3/31/2023    NO HISTORY OF SURGERY         FamHx:  Family History   Problem Relation Age of Onset    Lupus Maternal Uncle     Sarcoidosis Maternal Aunt      No family history of liver disease, liver cancer    SocHx:  Social History     Socioeconomic History    Marital status:      Spouse name: Not on file    Number of children: Not on file    Years of education: Not on file    Highest education level: Not on file   Occupational History    Not on file  "  Tobacco Use    Smoking status: Never    Smokeless tobacco: Never   Vaping Use    Vaping Use: Never used   Substance and Sexual Activity    Alcohol use: Not Currently    Drug use: Never    Sexual activity: Not on file   Other Topics Concern    Not on file   Social History Narrative    Not on file     Social Determinants of Health     Financial Resource Strain: Not on file   Food Insecurity: Not on file   Transportation Needs: Not on file   Physical Activity: Not on file   Stress: Not on file   Social Connections: Not on file   Interpersonal Safety: Not on file   Housing Stability: Not on file       Medications:  Current Outpatient Medications   Medication    Belimumab (BENLYSTA IV)    diphenhydrAMINE (BENADRYL) 25 MG tablet    hydroxychloroquine (PLAQUENIL) 200 MG tablet    magic mouthwash (ENTER INGREDIENTS IN COMMENTS) suspension    Multiple Vitamin (MULTIVITAMINS PO)     No current facility-administered medications for this visit.     No OTCs, herbals    Allergies:  Allergies   Allergen Reactions    Other Environmental Allergy Swelling     Cat & long haired dog dander - eyes swell, hives, airway gets tight - has never needed Epi Pen       Objective:  /79 (BP Location: Left arm, Patient Position: Sitting, Cuff Size: Adult Regular)   Pulse 87   Ht 1.702 m (5' 7\")   SpO2 98%   BMI 32.84 kg/m    Constitutional: pleasant woman in NAD  Eyes: non icteric  Respiratory: Normal respiratory excursion   MSK: normal range of motion of visualized extremities  Abd: Non distended  Skin: No jaundice  Psychiatric: normal mood and orientation    Labs:  Last Comprehensive Metabolic Panel:  Sodium   Date Value Ref Range Status   07/06/2022 141 133 - 144 mmol/L Final     Potassium   Date Value Ref Range Status   07/06/2022 4.4 3.4 - 5.3 mmol/L Final     Chloride   Date Value Ref Range Status   07/06/2022 109 94 - 109 mmol/L Final     Carbon Dioxide (CO2)   Date Value Ref Range Status   07/06/2022 29 20 - 32 mmol/L Final "     Anion Gap   Date Value Ref Range Status   07/06/2022 3 3 - 14 mmol/L Final     Glucose   Date Value Ref Range Status   02/10/2023 110 (H) 70 - 99 mg/dL Final     Urea Nitrogen   Date Value Ref Range Status   07/06/2022 12 7 - 30 mg/dL Final     Creatinine   Date Value Ref Range Status   08/03/2023 0.81 0.51 - 0.95 mg/dL Final     GFR Estimate   Date Value Ref Range Status   08/03/2023 >90 >60 mL/min/1.73m2 Final     Calcium   Date Value Ref Range Status   07/06/2022 9.7 8.5 - 10.1 mg/dL Final     Bilirubin Total   Date Value Ref Range Status   09/25/2023 0.3 <=1.2 mg/dL Final     Alkaline Phosphatase   Date Value Ref Range Status   09/25/2023 111 (H) 35 - 104 U/L Final     ALT   Date Value Ref Range Status   09/25/2023 91 (H) 0 - 50 U/L Final     Comment:     Reference intervals for this test were updated on 6/12/2023 to more accurately reflect our healthy population. There may be differences in the flagging of prior results with similar values performed with this method. Interpretation of those prior results can be made in the context of the updated reference intervals.       AST   Date Value Ref Range Status   09/25/2023 46 (H) 0 - 45 U/L Final     Comment:     Reference intervals for this test were updated on 6/12/2023 to more accurately reflect our healthy population. There may be differences in the flagging of prior results with similar values performed with this method. Interpretation of those prior results can be made in the context of the updated reference intervals.       Lab Results   Component Value Date    WBC 6.6 08/03/2023     Lab Results   Component Value Date    RBC 4.78 08/03/2023     Lab Results   Component Value Date    HGB 13.6 08/03/2023     Lab Results   Component Value Date    HCT 40.9 08/03/2023     Lab Results   Component Value Date    MCV 86 08/03/2023     Lab Results   Component Value Date    MCH 28.5 08/03/2023     Lab Results   Component Value Date    MCHC 33.3 08/03/2023     Lab  Results   Component Value Date    RDW 12.4 08/03/2023     Lab Results   Component Value Date     08/03/2023       INR   Date Value Ref Range Status   11/24/2021 1.07 0.85 - 1.15 Final        Computed MELD 3.0 unavailable. Necessary lab results were not found in the last year.  Computed MELD-Na unavailable. Necessary lab results were not found in the last year.      Imaging:    CT AP 11/2022    FINDINGS:     Kidneys: No renal or ureteral stone.  No hydronephrosis/hydroureter.  No renal mass, scarring,   or atrophy seen.   Urinary Bladder: Normal.   Liver: Slight diffuse fatty infiltration. No hepatomegaly. No evidence of cirrhosis. No mass   seen.   Gallbladder and Bile Ducts: Normal.   Spleen: Normal.   Pancreas: Normal.   Adrenals: Normal.   Aorta: Normal.   No adenopathy.   No ascites.   No significant  bowel abnormality.  No inflammation associated with large or small bowel.    Normal stomach.   Normal appendix.   No hernia at the umbilicus or elsewhere.   No adnexal lesion.   Negative for fracture or destructive bone lesion.     IMPRESSION:     1. No hernia at the umbilicus, or elsewhere.     2.  Slight diffuse fatty infiltration of the liver without hepatomegaly or hepatic cirrhosis.     3.  No biliary tract abnormality.     4.  No urinary tract abnormality.     5.  No bowel abnormality. Normal stomach. Normal appendix.     6.  No adnexal lesion.       Endoscopy: None    Independently reviewed labs and imaging.     Assessment/Plan: Ms. Nguyen is a 38-year-old woman with a history of lupus who presents for evaluation of elevated liver enzymes.  On review of her imaging she had mild hepatic steatosis fall 2022.  Her liver enzymes are mild to moderately elevated.  She had serologic work-up that was negative.      She takes Plaquenil, this is not associated with elevated liver enzymes.  She is on belimumab infusions, elevated AST and ALT are uncommon with this.  This can be associated with reactivation of  hepatitis B.  Her hepatitis B testing was negative.  I do not think her medications are contributing to her elevated liver enzymes.    Recommend serologic work-up for other causes of elevated liver enzymes.  Her liver enzymes are minimally elevated, would not recommend liver biopsy at this time.  Would continue to monitor and recommend weight loss for steatotic liver disease.    Place referral for colorectal surgery to talk about her external hemorrhoids    Orders Placed This Encounter   Procedures    US Abdomen Limited    Tissue transglutaminase latasha IgA and IgG    Hepatic panel (Albumin, ALT, AST, Bili, Alk Phos, TP)    IgG    F Actin EIA with reflex    CBC with platelets    Adult Comprehensive Weight Management  Referral    Adult Colorectal Surgery  Referral     RTC based on labs    Katherine Villagomez MD MS  Hepatology/Liver Transplant  [facility]

## 2023-09-25 NOTE — NURSING NOTE
"Chief Complaint   Patient presents with    New Patient     New consult for systemic lupus erythematosus and elevated LFTs.       She requests these members of her care team be copied on today's visit information:  PCP: Kaushik Luna    Referring Provider:  Felicita Herrera MD  [address] [place] [address]    Vitals:    09/25/23 1233   BP: 118/79   BP Location: Left arm   Patient Position: Sitting   Cuff Size: Adult Regular   Pulse: 87   SpO2: 98%   Height: 1.702 m (5' 7\")     Body mass index is 32.84 kg/m .    Medications were reconciled.        Nga Zaidi CMA    "

## 2023-09-26 LAB — IGG SERPL-MCNC: 867 MG/DL (ref 610–1616)

## 2023-09-27 LAB
TTG IGA SER-ACNC: 0.7 U/ML
TTG IGG SER-ACNC: <0.6 U/ML

## 2023-09-28 LAB — SMA IGG SER-ACNC: 5 UNITS

## 2023-09-28 NOTE — TELEPHONE ENCOUNTER
Diagnosis, Referred by & from: External Hemorrhoids   Appt date: 2023   NOTES STATUS DETAILS   OFFICE NOTE from referring provider [facility] - M23 - GI OV with Dr. Villagomez   OFFICE NOTE from other specialist Care Everywhere / [facility] - MG:  (NIEVES) 23 - Infusion    [place] [facility]:  8/3/23 - RHEUM OV with Dr. Sharon Caceres:  23 - PCC OV with Dr. Jaramillo    [facility]:  22 - PCC OV with Madai Grimes NP  22, 1/3/22 - SURG OV with Dr. Frost  19 - SURG OV with Dr. Stark   DISCHARGE SUMMARY from hospital N/A    DISCHARGE REPORT from the ER N/A    OPERATIVE REPORT Care Everywhere [facility]:  22 - OP Note for UMBILICAL HERNIA REPAIR WITH MESH with Dr. Frost   MEDICATION LIST Internal    LABS N/A    DIAGNOSTIC PROCEDURES N/A    IMAGING (DISC & REPORT)      CT Received [facility]:  22 - CT Abd/Pelvis  21 - CT Abd/Pelvis   XRAY Received [facility]:   23 - XR Abdomen   ULTRASOUND  (ENDOANAL/ENDORECTAL) Received / [facility]:  23 - US Abdomen    [facility]:  20 - US Pelvic     Records Requested  23    Facility  [facility]  Fax: [phone]   Outcome * 23 1:58 PM Faxed req to  for images to be pushed to [place] PACs. - Kirti    * 10/19/23 7:37 AM Images received from  and attached to the patient in PACs. Orly Cotto

## 2023-10-06 ENCOUNTER — INFUSION THERAPY VISIT (OUTPATIENT)
Dept: INFUSION THERAPY | Facility: CLINIC | Age: 38
End: 2023-10-06
Payer: COMMERCIAL

## 2023-10-06 VITALS
SYSTOLIC BLOOD PRESSURE: 110 MMHG | HEART RATE: 73 BPM | OXYGEN SATURATION: 98 % | DIASTOLIC BLOOD PRESSURE: 69 MMHG | RESPIRATION RATE: 18 BRPM | TEMPERATURE: 98.4 F

## 2023-10-06 DIAGNOSIS — M32.9 SYSTEMIC LUPUS ERYTHEMATOSUS, UNSPECIFIED SLE TYPE, UNSPECIFIED ORGAN INVOLVEMENT STATUS (H): Primary | ICD-10-CM

## 2023-10-06 PROCEDURE — 96413 CHEMO IV INFUSION 1 HR: CPT | Performed by: STUDENT IN AN ORGANIZED HEALTH CARE EDUCATION/TRAINING PROGRAM

## 2023-10-06 PROCEDURE — 96367 TX/PROPH/DG ADDL SEQ IV INF: CPT | Performed by: STUDENT IN AN ORGANIZED HEALTH CARE EDUCATION/TRAINING PROGRAM

## 2023-10-06 RX ORDER — ACETAMINOPHEN 325 MG/1
650 TABLET ORAL ONCE
Status: CANCELLED
Start: 2023-11-03

## 2023-10-06 RX ORDER — HEPARIN SODIUM,PORCINE 10 UNIT/ML
5 VIAL (ML) INTRAVENOUS
Status: CANCELLED | OUTPATIENT
Start: 2023-11-03

## 2023-10-06 RX ORDER — MEPERIDINE HYDROCHLORIDE 25 MG/ML
25 INJECTION INTRAMUSCULAR; INTRAVENOUS; SUBCUTANEOUS EVERY 30 MIN PRN
Status: CANCELLED | OUTPATIENT
Start: 2023-11-03

## 2023-10-06 RX ORDER — ACETAMINOPHEN 325 MG/1
650 TABLET ORAL ONCE
Status: COMPLETED | OUTPATIENT
Start: 2023-10-06 | End: 2023-10-06

## 2023-10-06 RX ORDER — DIPHENHYDRAMINE HYDROCHLORIDE 50 MG/ML
50 INJECTION INTRAMUSCULAR; INTRAVENOUS
Status: CANCELLED
Start: 2023-11-03

## 2023-10-06 RX ORDER — DIPHENHYDRAMINE HCL 25 MG
25 CAPSULE ORAL ONCE
Status: CANCELLED
Start: 2023-11-03

## 2023-10-06 RX ORDER — ALBUTEROL SULFATE 0.83 MG/ML
2.5 SOLUTION RESPIRATORY (INHALATION)
Status: CANCELLED | OUTPATIENT
Start: 2023-11-03

## 2023-10-06 RX ORDER — HEPARIN SODIUM (PORCINE) LOCK FLUSH IV SOLN 100 UNIT/ML 100 UNIT/ML
5 SOLUTION INTRAVENOUS
Status: CANCELLED | OUTPATIENT
Start: 2023-11-03

## 2023-10-06 RX ORDER — HEPARIN SODIUM (PORCINE) LOCK FLUSH IV SOLN 100 UNIT/ML 100 UNIT/ML
5 SOLUTION INTRAVENOUS
Status: DISCONTINUED | OUTPATIENT
Start: 2023-10-06 | End: 2023-10-06 | Stop reason: HOSPADM

## 2023-10-06 RX ORDER — ALBUTEROL SULFATE 90 UG/1
1-2 AEROSOL, METERED RESPIRATORY (INHALATION)
Status: CANCELLED
Start: 2023-11-03

## 2023-10-06 RX ORDER — EPINEPHRINE 1 MG/ML
0.3 INJECTION, SOLUTION INTRAMUSCULAR; SUBCUTANEOUS EVERY 5 MIN PRN
Status: CANCELLED | OUTPATIENT
Start: 2023-11-03

## 2023-10-06 RX ORDER — METHYLPREDNISOLONE SODIUM SUCCINATE 125 MG/2ML
125 INJECTION, POWDER, LYOPHILIZED, FOR SOLUTION INTRAMUSCULAR; INTRAVENOUS
Status: CANCELLED
Start: 2023-11-03

## 2023-10-06 RX ADMIN — HEPARIN SODIUM (PORCINE) LOCK FLUSH IV SOLN 100 UNIT/ML 5 ML: 100 SOLUTION at 15:51

## 2023-10-06 RX ADMIN — Medication 250 ML: at 14:34

## 2023-10-06 RX ADMIN — ACETAMINOPHEN 650 MG: 325 TABLET ORAL at 14:04

## 2023-10-06 NOTE — PROGRESS NOTES
Infusion Nursing Note:  Dasia Nguyen presents today for Bernylysdory.    Patient seen by provider today: No   present during visit today: Not Applicable.    Note: Patient expressed no new medical concerns. Patient expressed that her port is  which she said it was placed in May. Patient stated if she sleeps on that side or if she works out too much that she notices more pain to the site as well. Encouraged that if it causing issues to talk to provider. Port flushes easily and brisk blood return noted.    Intravenous Access:  Implanted Port.    Treatment Conditions:  Biological Infusion Checklist:  ~~~ NOTE: If the patient answers yes to any of the questions below, hold the infusion and contact ordering provider or on-call provider.    Have you recently had an elevated temperature, fever, chills, productive cough, coughing for 3 weeks or longer or hemoptysis,  abnormal vital signs, night sweats,  chest pain or have you noticed a decrease in your appetite, unexplained weight loss or fatigue? No  Do you have any open wounds or new incisions? No  Do you have any upcoming hospitalizations or surgeries? Does not include esophagogastroduodenoscopy, colonoscopy, endoscopic retrograde cholangiopancreatography (ERCP), endoscopic ultrasound (EUS), dental procedures or joint aspiration/steroid injections No  Do you currently have any signs of illness or infection or are you on any antibiotics? No  Have you had any new, sudden or worsening abdominal pain? No  Have you or anyone in your household received a live vaccination in the past 4 weeks? Please note: No live vaccines while on biologic/chemotherapy until 6 months after the last treatment. Patient can receive the flu vaccine (shot only), pneumovax and the Covid vaccine. It is optimal for the patient to get these vaccines mid cycle, but they can be given at any time as long as it is not on the day of the infusion. No  Have you recently been diagnosed  with any new nervous system diseases (ie. Multiple sclerosis, Guillain Laona, seizures, neurological changes) or cancer diagnosis? Are you on any form of radiation or chemotherapy? No  Are you pregnant or breast feeding or do you have plans of pregnancy in the future? No  Have you been having any signs of worsening depression or suicidal ideations?  (benlysta only) No  Have there been any other new onset medical symptoms? No      Post Infusion Assessment:  Patient tolerated infusion without incident.  Blood return noted pre and post infusion.  Site patent and intact, free from redness, edema or discomfort.  No evidence of extravasations.  Access discontinued per protocol.       Discharge Plan:   AVS to patient via MYCHART.  Patient will return 11/3/23 for next appointment.   Patient discharged in stable condition accompanied by: self.  Departure Mode: Ambulatory.      Jovana Miller RN

## 2023-10-31 ENCOUNTER — TELEPHONE (OUTPATIENT)
Dept: RHEUMATOLOGY | Facility: CLINIC | Age: 38
End: 2023-10-31

## 2023-10-31 DIAGNOSIS — M32.9 SYSTEMIC LUPUS ERYTHEMATOSUS, UNSPECIFIED SLE TYPE, UNSPECIFIED ORGAN INVOLVEMENT STATUS (H): Primary | ICD-10-CM

## 2023-10-31 NOTE — TELEPHONE ENCOUNTER
[facility] Call Center    Phone Message    May a detailed message be left on voicemail: yes     Reason for Call: Other: Pt is calling to see if she can get a bag of electrolytes added for her next infusion appointment on 11/3. Pt states it was recommended to help else the side effects. Pt would like a call back at [phone].      Action Taken: Message routed to:  Other: [facility]    Travel Screening: Not Applicable

## 2023-10-31 NOTE — TELEPHONE ENCOUNTER
Patient asking for additional fluids with her next Benlysta infusion.  States it helped lessen the flu like s/s she usually gets after the infusion.  She had 250cc NS.  She is wondering if she could have 500cc NS instead, as the infusion nurse mentioned a larger amount may work even better.     Dang Lopez RN

## 2023-11-02 NOTE — TELEPHONE ENCOUNTER
I updated the therapy plan to reflect the extra 500cc fluids. Please review and sign.     Dang Lopez RN

## 2023-11-03 ENCOUNTER — INFUSION THERAPY VISIT (OUTPATIENT)
Dept: INFUSION THERAPY | Facility: CLINIC | Age: 38
End: 2023-11-03
Attending: STUDENT IN AN ORGANIZED HEALTH CARE EDUCATION/TRAINING PROGRAM
Payer: COMMERCIAL

## 2023-11-03 VITALS
OXYGEN SATURATION: 99 % | RESPIRATION RATE: 16 BRPM | TEMPERATURE: 98.4 F | HEART RATE: 69 BPM | DIASTOLIC BLOOD PRESSURE: 75 MMHG | SYSTOLIC BLOOD PRESSURE: 114 MMHG

## 2023-11-03 DIAGNOSIS — M32.9 SYSTEMIC LUPUS ERYTHEMATOSUS, UNSPECIFIED SLE TYPE, UNSPECIFIED ORGAN INVOLVEMENT STATUS (H): Primary | ICD-10-CM

## 2023-11-03 PROCEDURE — 96375 TX/PRO/DX INJ NEW DRUG ADDON: CPT | Performed by: STUDENT IN AN ORGANIZED HEALTH CARE EDUCATION/TRAINING PROGRAM

## 2023-11-03 PROCEDURE — 96413 CHEMO IV INFUSION 1 HR: CPT | Performed by: STUDENT IN AN ORGANIZED HEALTH CARE EDUCATION/TRAINING PROGRAM

## 2023-11-03 RX ORDER — DIPHENHYDRAMINE HYDROCHLORIDE 50 MG/ML
50 INJECTION INTRAMUSCULAR; INTRAVENOUS
Status: CANCELLED
Start: 2023-12-01

## 2023-11-03 RX ORDER — EPINEPHRINE 1 MG/ML
0.3 INJECTION, SOLUTION INTRAMUSCULAR; SUBCUTANEOUS EVERY 5 MIN PRN
Status: CANCELLED | OUTPATIENT
Start: 2023-12-01

## 2023-11-03 RX ORDER — ACETAMINOPHEN 325 MG/1
650 TABLET ORAL ONCE
Status: CANCELLED
Start: 2023-12-01

## 2023-11-03 RX ORDER — HEPARIN SODIUM (PORCINE) LOCK FLUSH IV SOLN 100 UNIT/ML 100 UNIT/ML
500 SOLUTION INTRAVENOUS
Status: DISCONTINUED | OUTPATIENT
Start: 2023-11-03 | End: 2023-11-03 | Stop reason: HOSPADM

## 2023-11-03 RX ORDER — ALBUTEROL SULFATE 0.83 MG/ML
2.5 SOLUTION RESPIRATORY (INHALATION)
Status: CANCELLED | OUTPATIENT
Start: 2023-12-01

## 2023-11-03 RX ORDER — ALBUTEROL SULFATE 90 UG/1
1-2 AEROSOL, METERED RESPIRATORY (INHALATION)
Status: CANCELLED
Start: 2023-12-01

## 2023-11-03 RX ORDER — DIPHENHYDRAMINE HCL 25 MG
25 CAPSULE ORAL ONCE
Status: CANCELLED
Start: 2023-12-01

## 2023-11-03 RX ORDER — MEPERIDINE HYDROCHLORIDE 25 MG/ML
25 INJECTION INTRAMUSCULAR; INTRAVENOUS; SUBCUTANEOUS EVERY 30 MIN PRN
Status: CANCELLED | OUTPATIENT
Start: 2023-12-01

## 2023-11-03 RX ORDER — HEPARIN SODIUM,PORCINE 10 UNIT/ML
5 VIAL (ML) INTRAVENOUS
Status: CANCELLED | OUTPATIENT
Start: 2023-12-01

## 2023-11-03 RX ORDER — HEPARIN SODIUM (PORCINE) LOCK FLUSH IV SOLN 100 UNIT/ML 100 UNIT/ML
5 SOLUTION INTRAVENOUS
Status: CANCELLED | OUTPATIENT
Start: 2023-12-01

## 2023-11-03 RX ORDER — METHYLPREDNISOLONE SODIUM SUCCINATE 125 MG/2ML
125 INJECTION, POWDER, LYOPHILIZED, FOR SOLUTION INTRAMUSCULAR; INTRAVENOUS
Status: CANCELLED
Start: 2023-12-01

## 2023-11-03 RX ORDER — ACETAMINOPHEN 325 MG/1
650 TABLET ORAL ONCE
Status: COMPLETED | OUTPATIENT
Start: 2023-11-03 | End: 2023-11-03

## 2023-11-03 RX ADMIN — HEPARIN SODIUM (PORCINE) LOCK FLUSH IV SOLN 100 UNIT/ML 500 UNITS: 100 SOLUTION at 16:19

## 2023-11-03 RX ADMIN — ACETAMINOPHEN 650 MG: 325 TABLET ORAL at 14:14

## 2023-11-03 RX ADMIN — Medication 500 ML: at 14:26

## 2023-11-03 ASSESSMENT — PAIN SCALES - GENERAL: PAINLEVEL: SEVERE PAIN (6)

## 2023-11-03 NOTE — PROGRESS NOTES
Infusion Nursing Note:  Dasia Nguyen presents today for Bernylysta.    Patient seen by provider today: No   present during visit today: Not Applicable.    Note: N/A.    Intravenous Access:  Implanted Port.    Treatment Conditions:  Biological Infusion Checklist:  ~~~ NOTE: If the patient answers yes to any of the questions below, hold the infusion and contact ordering provider or on-call provider.    Have you recently had an elevated temperature, fever, chills, productive cough, coughing for 3 weeks or longer or hemoptysis,  abnormal vital signs, night sweats,  chest pain or have you noticed a decrease in your appetite, unexplained weight loss or fatigue? No  Do you have any open wounds or new incisions? No  Do you have any upcoming hospitalizations or surgeries? Does not include esophagogastroduodenoscopy, colonoscopy, endoscopic retrograde cholangiopancreatography (ERCP), endoscopic ultrasound (EUS), dental procedures or joint aspiration/steroid injections No  Do you currently have any signs of illness or infection or are you on any antibiotics? No  Have you had any new, sudden or worsening abdominal pain? No  Have you or anyone in your household received a live vaccination in the past 4 weeks? Please note: No live vaccines while on biologic/chemotherapy until 6 months after the last treatment. Patient can receive the flu vaccine (shot only), pneumovax and the Covid vaccine. It is optimal for the patient to get these vaccines mid cycle, but they can be given at any time as long as it is not on the day of the infusion. No  Have you recently been diagnosed with any new nervous system diseases (ie. Multiple sclerosis, Guillain Wallace, seizures, neurological changes) or cancer diagnosis? Are you on any form of radiation or chemotherapy? No  Are you pregnant or breast feeding or do you have plans of pregnancy in the future? No  Have you been having any signs of worsening depression or suicidal ideations?   (benlysta only) No  Have there been any other new onset medical symptoms? No  Have you had any new blood clots? (IVIG only) No    Post Infusion Assessment:  Patient tolerated infusion without incident.  Blood return noted pre and post infusion.  Site patent and intact, free from redness, edema or discomfort.  No evidence of extravasations.  Access discontinued per protocol.     Discharge Plan:   Patient will return 12/1/2023 for next appointment.   Future appts have been reviewed and crosschecked with appt note and plan.  Patient discharged in stable condition accompanied by: self.  Departure Mode: Ambulatory.    Radha Howe RN-BSN, PHN, OCN  [facility]

## 2023-12-01 ENCOUNTER — INFUSION THERAPY VISIT (OUTPATIENT)
Dept: INFUSION THERAPY | Facility: CLINIC | Age: 38
End: 2023-12-01
Attending: STUDENT IN AN ORGANIZED HEALTH CARE EDUCATION/TRAINING PROGRAM
Payer: COMMERCIAL

## 2023-12-01 VITALS
RESPIRATION RATE: 16 BRPM | TEMPERATURE: 99 F | DIASTOLIC BLOOD PRESSURE: 86 MMHG | OXYGEN SATURATION: 98 % | WEIGHT: 209.6 LBS | SYSTOLIC BLOOD PRESSURE: 118 MMHG | BODY MASS INDEX: 32.83 KG/M2 | HEART RATE: 92 BPM

## 2023-12-01 DIAGNOSIS — M32.9 SYSTEMIC LUPUS ERYTHEMATOSUS, UNSPECIFIED SLE TYPE, UNSPECIFIED ORGAN INVOLVEMENT STATUS (H): Primary | ICD-10-CM

## 2023-12-01 PROCEDURE — 96367 TX/PROPH/DG ADDL SEQ IV INF: CPT | Performed by: STUDENT IN AN ORGANIZED HEALTH CARE EDUCATION/TRAINING PROGRAM

## 2023-12-01 PROCEDURE — 96413 CHEMO IV INFUSION 1 HR: CPT | Performed by: STUDENT IN AN ORGANIZED HEALTH CARE EDUCATION/TRAINING PROGRAM

## 2023-12-01 RX ORDER — DIPHENHYDRAMINE HYDROCHLORIDE 50 MG/ML
50 INJECTION INTRAMUSCULAR; INTRAVENOUS
Start: 2023-12-01

## 2023-12-01 RX ORDER — ALBUTEROL SULFATE 90 UG/1
1-2 AEROSOL, METERED RESPIRATORY (INHALATION)
Start: 2023-12-01

## 2023-12-01 RX ORDER — MEPERIDINE HYDROCHLORIDE 25 MG/ML
25 INJECTION INTRAMUSCULAR; INTRAVENOUS; SUBCUTANEOUS EVERY 30 MIN PRN
OUTPATIENT
Start: 2023-12-01

## 2023-12-01 RX ORDER — HEPARIN SODIUM,PORCINE 10 UNIT/ML
5 VIAL (ML) INTRAVENOUS
OUTPATIENT
Start: 2023-12-01

## 2023-12-01 RX ORDER — EPINEPHRINE 1 MG/ML
0.3 INJECTION, SOLUTION INTRAMUSCULAR; SUBCUTANEOUS EVERY 5 MIN PRN
OUTPATIENT
Start: 2023-12-01

## 2023-12-01 RX ORDER — HEPARIN SODIUM (PORCINE) LOCK FLUSH IV SOLN 100 UNIT/ML 100 UNIT/ML
5 SOLUTION INTRAVENOUS
OUTPATIENT
Start: 2023-12-01

## 2023-12-01 RX ORDER — ACETAMINOPHEN 325 MG/1
650 TABLET ORAL ONCE
Start: 2023-12-01

## 2023-12-01 RX ORDER — HEPARIN SODIUM (PORCINE) LOCK FLUSH IV SOLN 100 UNIT/ML 100 UNIT/ML
5 SOLUTION INTRAVENOUS
Status: DISCONTINUED | OUTPATIENT
Start: 2023-12-01 | End: 2023-12-01 | Stop reason: HOSPADM

## 2023-12-01 RX ORDER — ALBUTEROL SULFATE 0.83 MG/ML
2.5 SOLUTION RESPIRATORY (INHALATION)
OUTPATIENT
Start: 2023-12-01

## 2023-12-01 RX ORDER — METHYLPREDNISOLONE SODIUM SUCCINATE 125 MG/2ML
125 INJECTION, POWDER, LYOPHILIZED, FOR SOLUTION INTRAMUSCULAR; INTRAVENOUS
Start: 2023-12-01

## 2023-12-01 RX ORDER — DIPHENHYDRAMINE HCL 25 MG
25 CAPSULE ORAL ONCE
Start: 2023-12-01

## 2023-12-01 RX ORDER — ACETAMINOPHEN 325 MG/1
650 TABLET ORAL ONCE
Status: COMPLETED | OUTPATIENT
Start: 2023-12-01 | End: 2023-12-01

## 2023-12-01 RX ADMIN — Medication 500 ML: at 14:21

## 2023-12-01 RX ADMIN — ACETAMINOPHEN 650 MG: 325 TABLET ORAL at 14:30

## 2023-12-01 RX ADMIN — HEPARIN SODIUM (PORCINE) LOCK FLUSH IV SOLN 100 UNIT/ML 5 ML: 100 SOLUTION at 16:11

## 2023-12-01 NOTE — PROGRESS NOTES
Infusion Nursing Note:  Dasia Nguyen presents today for Benlysta.    Patient seen by provider today: No   present during visit today: Not Applicable.    Note: Patient reports she is in a flare today and isn't feeling the greatest, she is hopeful the Benlysta will help as it has in the past.     Intravenous Access:  Implanted Port.    Treatment Conditions:  Biological Infusion Checklist:  ~~~ NOTE: If the patient answers yes to any of the questions below, hold the infusion and contact ordering provider or on-call provider.    Have you recently had an elevated temperature, fever, chills, productive cough, coughing for 3 weeks or longer or hemoptysis,  abnormal vital signs, night sweats,  chest pain or have you noticed a decrease in your appetite, unexplained weight loss or fatigue? No  Do you have any open wounds or new incisions? No  Do you have any upcoming hospitalizations or surgeries? Does not include esophagogastroduodenoscopy, colonoscopy, endoscopic retrograde cholangiopancreatography (ERCP), endoscopic ultrasound (EUS), dental procedures or joint aspiration/steroid injections No  Do you currently have any signs of illness or infection or are you on any antibiotics? No  Have you had any new, sudden or worsening abdominal pain? No  Have you or anyone in your household received a live vaccination in the past 4 weeks? Please note: No live vaccines while on biologic/chemotherapy until 6 months after the last treatment. Patient can receive the flu vaccine (shot only), pneumovax and the Covid vaccine. It is optimal for the patient to get these vaccines mid cycle, but they can be given at any time as long as it is not on the day of the infusion. No  Have you recently been diagnosed with any new nervous system diseases (ie. Multiple sclerosis, Guillain Trent, seizures, neurological changes) or cancer diagnosis? Are you on any form of radiation or chemotherapy? No  Are you pregnant or breast feeding or  do you have plans of pregnancy in the future? No  Have you been having any signs of worsening depression or suicidal ideations?  (benlysta only) No  Have there been any other new onset medical symptoms? No  Have you had any new blood clots? (IVIG only) No    Post Infusion Assessment:  Patient tolerated infusion without incident.  Site patent and intact, free from redness, edema or discomfort.  No evidence of extravasations.  Access discontinued per protocol.  Biologic Infusion Post Education: Call the triage nurse at your clinic or seek medical attention if you have chills and/or temperature greater than or equal to 100.5, uncontrolled nausea/vomiting, diarrhea, constipation, dizziness, shortness of breath, chest pain, heart palpitations, weakness or any other new or concerning symptoms, questions or concerns.  You cannot have any live virus vaccines prior to or during treatment or up to 6 months post infusion.  If you have an upcoming surgery, medical procedure or dental procedure during treatment, this should be discussed with your ordering physician and your surgeon/dentist.  If you are having any concerning symptom, if you are unsure if you should get your next infusion or wish to speak to a provider before your next infusion, please call your care coordinator or triage nurse at your clinic to notify them so we can adequately serve you.     Discharge Plan:   Future appts have been reviewed and crosschecked with appt note and plan.  AVS to patient via [employer].  Patient will return 12/29/2023 for next appointment.   Patient discharged in stable condition accompanied by: self.  Departure Mode: Ambulatory.      Alyson Berkowitz RN BSN OCN

## 2023-12-07 ENCOUNTER — TELEPHONE (OUTPATIENT)
Dept: RHEUMATOLOGY | Facility: CLINIC | Age: 38
End: 2023-12-07
Payer: COMMERCIAL

## 2023-12-07 ENCOUNTER — VIRTUAL VISIT (OUTPATIENT)
Dept: RHEUMATOLOGY | Facility: CLINIC | Age: 38
End: 2023-12-07
Attending: STUDENT IN AN ORGANIZED HEALTH CARE EDUCATION/TRAINING PROGRAM
Payer: COMMERCIAL

## 2023-12-07 DIAGNOSIS — M32.9 SYSTEMIC LUPUS ERYTHEMATOSUS (H): Primary | ICD-10-CM

## 2023-12-07 DIAGNOSIS — R76.8 POSITIVE DOUBLE STRANDED DNA ANTIBODY TEST: ICD-10-CM

## 2023-12-07 DIAGNOSIS — R56.9 SEIZURE-LIKE ACTIVITY (H): ICD-10-CM

## 2023-12-07 DIAGNOSIS — M32.9 SYSTEMIC LUPUS ERYTHEMATOSUS, UNSPECIFIED SLE TYPE, UNSPECIFIED ORGAN INVOLVEMENT STATUS (H): Primary | ICD-10-CM

## 2023-12-07 DIAGNOSIS — L93.2 CUTANEOUS LUPUS ERYTHEMATOSUS: ICD-10-CM

## 2023-12-07 DIAGNOSIS — Z78.9 TAKES DIETARY SUPPLEMENTS: ICD-10-CM

## 2023-12-07 NOTE — Clinical Note
12/7/2023       RE: Dasia Nguyen  [address]  [place] [facility] [address]     Dear Colleague,    Thank you for referring your patient, Dasia Nguyen, to the [facility] [place] [facility]. Please see a copy of my visit note below.    Medication Therapy Management (MTM) Encounter    ASSESSMENT:                            Medication Adherence/Access: No issues identified    ***:  ***      PLAN:                                Follow-up: ***    SUBJECTIVE/OBJECTIVE:                          Enid Nguyen is a 38 year old female called for an initial visit. She was referred to me from Felicita Herrera MD.      Reason for visit: Transition to home infusion.    Allergies/ADRs: Reviewed in chart  Past Medical History: Reviewed in chart  Tobacco: She reports that she has never smoked. She has never used smokeless tobacco.  Alcohol: none    Medication Adherence/Access: no issues reported    Systemic Lupus Erythematosus:   Benlysta infusion every 4 weeks   Hydroxychloroquine 200 mg twice daily       Would prefer home infusion - does not like driving home after the infusions    Benlysta going well - notices adverse effects for few days after   Not as many flares while on Benlysta and butterfly rash gone. Lessens/avoids flares altogether and no mouth sores since starting.     Does not like hydroxychloroquine - feels worse while on. Reported hair loss, nausea with vomiting, and fatigue.   Wants to see if she can decrease to 200 mg daily (CHECK WEIGHT DOSING)  Had las eye exam in May 2023    Seizures:  Benadryl as needed    Works well. Can feel them coming on.  Neurologist told her system overloaded with everytyhing   Most of the time can catch.           Today's Vitals: There were no vitals taken for this visit.  ----------------    I spent 30 minutes with this patient today. All changes were made via collaborative practice agreement with  Felicita Herrera MD. A copy of the visit note was provided to the patient's provider(s).    A summary of these recommendations was sent via [employer].    Evelina HowellD  Medication Therapy Management Pharmacist  [facility]  Phone: [phone]     Telemedicine Visit Details  Type of service:  Telephone visit  Start Time:  3:00 PM  End Time:  3:30 PM     Medication Therapy Recommendations  No medication therapy recommendations to display         Medication Therapy Management (MTM) Encounter    ASSESSMENT:                            Medication Adherence/Access: No issues identified    Systemic Lupus Erythematosus: Patient no longer able to go to the infusion center due to insurance coverage. Switching to home infusion and assisting with the transition. Tolerating Benlysta well and would benefit from continuing infusions every 4 weeks. Due for next infusion on 12/29/23. Will reduce extra fluid volume to 250 mL per patient request and will discuss hydroxychloroquine dose with provider.     Seizure-like activity: Stable. Patient managing with neurologist.     Takes Dietary Supplements: Stable.     PLAN:                            Continue Benlysta infusions every 4 weeks (Next infusion due 12/29/23)  I forwarded this information along to the home infusion team to get you scheduled.    I adjusted the extra fluid volume to 250 mL.    I will discuss the hydroxychloroquine dose with Dr. Herrera.     Follow-up: Patient to reach out with questions. I will reach out with updates.     SUBJECTIVE/OBJECTIVE:                          Enid Nguyen is a 38 year old female called for an initial visit. She was referred to me from Felicita Herrera MD.      Reason for visit: Transition to home infusion.    Allergies/ADRs: Reviewed in chart  Past Medical History: Reviewed in chart  Tobacco: She reports that she has never smoked. She has never used smokeless tobacco.  Alcohol: none    Medication  Adherence/Access: no issues reported    Systemic Lupus Erythematosus:   Benlysta infusion every 4 weeks   Hydroxychloroquine 200 mg twice daily   Magic Mouthwash as needed   Patient aware of the infusion change and prefers home infusion over another site. Typically notices side effects immediately after the infusion and is concerned about driving. Reported Benlysta infusions have been going well. Noticed many fewer and less severe flares since starting. Butterfly rash and mouth sores have completely resolved. Experiences side effects for a few days after the infusion. Received 250 mL of extra fluid at a previous infusion which seemed to help. Increased to 500 mL at following infusion and does not feel that added any benefit. Requests going back to the 250 mL of extra fluid. Also mentioned that her surgeon strongly recommended getting the infusion on time, every 4 weeks. Concerned of complications with access port if not checked regularly. Reported feeling worse with hydroxychloroquine. Reported medication is causing hair loss, nausea with vomiting, and fatigue. Is hoping to reduce dose and see if that helps.     Seizure-like activity:  Benadryl  mg as needed for seizure  Is able to feel the seizure coming on. Takes Benadryl right away which works well. Able to catch them before they occur most of the time. Reported seizures occur due to Lupus and her system overloading. Works closely with neurologist to manage.     Takes Dietary Supplements:  Multivitamin daily   Takes for general health. No concerns at this time.     Today's Vitals: There were no vitals taken for this visit.  ----------------    I spent 30 minutes with this patient today. All changes were made via collaborative practice agreement with Felicita Herrera MD. A copy of the visit note was provided to the patient's provider(s).    A summary of these recommendations was sent via [employer].    Juan Erazo, Elenita  Medication Therapy Management  [facility]  Phone: [phone]     Telemedicine Visit Details  Type of service:  Telephone visit  Start Time:  3:00 PM  End Time:  3:30 PM     Medication Therapy Recommendations  No medication therapy recommendations to display           Again, thank you for allowing me to participate in the care of your patient.      Sincerely,    Juan Erazo RPH

## 2023-12-07 NOTE — PROGRESS NOTES
Medication Therapy Management (MTM) Encounter    ASSESSMENT:                            Medication Adherence/Access: No issues identified    Systemic Lupus Erythematosus: Patient no longer able to go to the infusion center due to insurance coverage. Switching to home infusion and assisting with the transition. Tolerating Benlysta well and would benefit from continuing infusions every 4 weeks. Due for next infusion on 12/29/23. Will reduce extra IV hydration volume to 250 mL per patient request. Discussed hydroxychloroquine dose with provider and appropriate to decrease dose to 200 mg daily to help with adverse effects.      Seizure-like activity: Stable. Patient managing with neurologist.     Takes Dietary Supplements: Stable.     PLAN:                            Continue Benlysta infusions every 4 weeks (Next infusion due 12/29/23)  I forwarded this information along to the home infusion team to get you scheduled.    I adjusted the extra fluid volume to 250 mL.    Okay to reduce hydroxychloroquine to 200 mg daily.     Follow-up: Patient to reach out with questions. I will reach out with updates.     SUBJECTIVE/OBJECTIVE:                          Enid Nguyen is a 38 year old female called for an initial visit. She was referred to me from Felicita Herrera MD.     Reason for visit: Transition to home infusion.    Allergies/ADRs: Reviewed in chart  Past Medical History: Reviewed in chart  Tobacco: She reports that she has never smoked. She has never used smokeless tobacco.  Alcohol: none    Medication Adherence/Access: no issues reported    Systemic Lupus Erythematosus:   Benlysta infusion every 4 weeks   Hydroxychloroquine 200 mg twice daily   Magic Mouthwash as needed   Patient aware of the infusion change and prefers home infusion over another site. Typically notices side effects immediately after the infusion and is concerned about driving. Reported Benlysta infusions have been going well. Noticed many fewer and  less severe flares since starting. Butterfly rash and mouth sores have completely resolved. Experiences side effects for a few days after the infusion. Received 250 mL of extra fluid at a previous infusion which seemed to help. Increased to 500 mL at following infusion and does not feel that added any benefit. Requests going back to the 250 mL of extra fluid. Also mentioned that her surgeon strongly recommended getting the infusion on time, every 4 weeks. Concerned of complications with access port if not checked regularly. Reported feeling worse with hydroxychloroquine. Reported medication is causing hair loss, nausea with vomiting, and fatigue. Is hoping to reduce dose and see if that helps.     Seizure-like activity:  Benadryl  mg as needed for seizure  Is able to feel the seizure coming on. Takes Benadryl right away which works well. Able to catch them before they occur most of the time. Reported seizures occur due to Lupus and her system overloading. Works closely with neurologist to manage.     Takes Dietary Supplements:  Multivitamin daily   Takes for general health. No concerns at this time.     Today's Vitals: There were no vitals taken for this visit.  ----------------    I spent 30 minutes with this patient today. All changes were made via collaborative practice agreement with Felicita Herrera MD. A copy of the visit note was provided to the patient's provider(s).    A summary of these recommendations was sent via [employer]ati[employer].    Juan Erazo, Elenita  Medication Therapy Management Pharmacist  [facility]  Phone: [phone]     Telemedicine Visit Details  Type of service:  Telephone visit  Start Time:  3:00 PM  End Time:  3:30 PM     Medication Therapy Recommendations  Systemic lupus erythematosus (H)    Current Medication: Belimumab (BENLYSTA IV)   Rationale: Cannot swallow/administer medication - Adherence - Adherence   Recommendation: Provide Adherence Intervention -  Switching from infusion center to home infusion   Status: Patient Agreed - Adherence/Education          Current Medication: hydroxychloroquine (PLAQUENIL) 200 MG tablet   Rationale: Dose too high - Dosage too high - Safety   Recommendation: Decrease Dose - Decrease to 200 mg daily   Status: Accepted per CPA

## 2023-12-08 ENCOUNTER — CARE COORDINATION (OUTPATIENT)
Dept: PHARMACY | Facility: CLINIC | Age: 38
End: 2023-12-08
Payer: COMMERCIAL

## 2023-12-08 NOTE — PROGRESS NOTES
Updated Benlysta infusion orders with 250 mL of IV hydration per patient request. Continuing everything else as before. Notified Home Infusion team who will work with patient to get scheduled.     Juan Erazo, PharmD  Medication Therapy Management Pharmacist  [facility]  Phone: [phone]

## 2023-12-08 NOTE — PATIENT INSTRUCTIONS
"Recommendations from today's MTM visit:                                                    MTM (medication therapy management) is a service provided by a clinical pharmacist designed to help you get the most of out of your medicines.      Continue Benlysta infusions every 4 weeks (Next infusion due 12/29/23)  I forwarded this information along to the home infusion team to get you scheduled.    I adjusted the extra fluid volume to 250 mL.    Okay to reduce hydroxychloroquine to 200 mg daily.     Follow-up: Patient to reach out with questions. I will reach out with updates.     It was great speaking with you today.  I value your experience and would be very thankful for your time in providing feedback in our clinic survey. In the next few days, you may receive an email or text message from [employer] with a link to a survey related to your  clinical pharmacist.\"     To schedule another MTM appointment, please call the clinic directly or you may call the MTM scheduling line at [phone] or toll-free at [phone].     My Clinical Pharmacist's contact information:                                                      Please feel free to contact me with any questions or concerns you have.      Juan Erazo, PharmD  Medication Therapy Management Pharmacist  [facility]  Phone: [phone]    "

## 2023-12-11 ENCOUNTER — PRE VISIT (OUTPATIENT)
Dept: SURGERY | Facility: CLINIC | Age: 38
End: 2023-12-11

## 2023-12-11 ENCOUNTER — OFFICE VISIT (OUTPATIENT)
Dept: SURGERY | Facility: CLINIC | Age: 38
End: 2023-12-11
Payer: COMMERCIAL

## 2023-12-11 VITALS
SYSTOLIC BLOOD PRESSURE: 116 MMHG | OXYGEN SATURATION: 98 % | HEIGHT: 67 IN | DIASTOLIC BLOOD PRESSURE: 81 MMHG | BODY MASS INDEX: 32.83 KG/M2 | HEART RATE: 99 BPM

## 2023-12-11 DIAGNOSIS — K64.4 EXTERNAL HEMORRHOIDS: ICD-10-CM

## 2023-12-11 DIAGNOSIS — K64.8 INTERNAL HEMORRHOID: Primary | ICD-10-CM

## 2023-12-11 DIAGNOSIS — K64.4 ANAL SKIN TAG: ICD-10-CM

## 2023-12-11 PROCEDURE — 46600 DIAGNOSTIC ANOSCOPY SPX: CPT | Performed by: NURSE PRACTITIONER

## 2023-12-11 PROCEDURE — 99213 OFFICE O/P EST LOW 20 MIN: CPT | Mod: 25 | Performed by: NURSE PRACTITIONER

## 2023-12-11 PROCEDURE — 99207 PR NON-BILLABLE SERV PER CHARTING: CPT | Performed by: NURSE PRACTITIONER

## 2023-12-11 ASSESSMENT — PAIN SCALES - GENERAL: PAINLEVEL: MODERATE PAIN (4)

## 2023-12-11 NOTE — LETTER
"2023       RE: Dasia Nguyen   [address] [facility] [address]     Dear Colleague,    Thank you for referring your patient, Dasia Nguyen, to the [facility]IN[facility]. Please see a copy of my visit note below.    Patient seen today, 23, with RYLIE Delatorre.  I agree with the dictation and have made any necessary changes.     JAYESH Soliz, NP-C  Colon and Rectal Surgery  [facility]     Colon and Rectal Surgery Consult Clinic Note    Date: 2023     Referring provider:  Katherine Villagomez MD  [address] [place] [address]     RE: Dasia Nguyen  : 1985  AUDREY: 2023    Dasia Nguyen is a very pleasant 38 year old female that presents today for external hemorrhoids. Patient reports she notices blood in the toilet bowel or on the toilet paper about once a month. She states occasionally she will have \"zingers\" and \"sharp pain\" in her rectum but that is infrequent and not currently happening. Patient denies any fhx of IBD or colorectal cancer. She states she has 2-3 bowel movements a day and they vary from very soft to hard stools. She does notice she has to strain sometimes with bowel movements. She does not take any fiber. Denies any anal receptive sex.    Physical Examination:  /81 (BP Location: Left arm, Patient Position: Sitting, Cuff Size: Adult Large)   Pulse 99   Ht 5' 7\"   SpO2 98%   BMI 32.83 kg/m    General: alert and oriented sitting comfortably in her chair     Perianal external examination: Exam was chaperoned by Daisha Russell NP-C  Skin tag in anterior portion. No ulcerations noted. Scaring from possible previous fissures - no current fissure appreciated on exam   Digital rectal examination: Was performed.   Sphincter tone: Good.  Palpable lesions: No.  Other: None.  Bimanual " examination: was not performed    Anoscopy: Was performed.   Hemorrhoids: Yes. Grade 1-2 internal hemorrhoid in anterior position and left lateral position not bleeding.   Lesions: No    Assessment/Plan: 38 year old female with grade 1-2 internal hemorrhoids in the anterior position and left lateral position and skin tag in the anterior position with no bleeding. Patient has a PMH of lupus and would like to avoid any surgery. Discussed with patient that due to her skin tag being so small and not having started treatment options for internal hemorrhoids that we would not suggest surgery at this point. Patient is relieved. Encouraged her to start a daily fiber supplement such as Citrucel or Metamucil powder. Start with one tablespoon once a day and slowly increase up to three times a day, if needed, over the next 4-6 weeks. Discussed with the patient that if symptoms persist we can discuss hemorrhoid banding to help with symptom relief. Patient was agreeable to this plan. Patient is agreeable to this plan. Answered all her questions to her satisfaction. Encouraged to reach out with any additional questions or concerns.         Medical history:  Past Medical History:   Diagnosis Date    Chest pain on breathing     Fatigue     Multiple joint pain     Photosensitivity        Surgical history:  Past Surgical History:   Procedure Laterality Date    IR CHEST PORT PLACEMENT > 5 YRS OF AGE  3/31/2023    NO HISTORY OF SURGERY         Problem list:    Patient Active Problem List    Diagnosis Date Noted    Systemic lupus erythematosus (H) 08/06/2022     Priority: Medium    Chest pain on breathing 11/24/2021     Priority: Medium    Fatigue 11/24/2021     Priority: Medium    Multiple joint pain 11/24/2021     Priority: Medium    Photosensitivity 11/24/2021     Priority: Medium       Medications:  Current Outpatient Medications   Medication Sig Dispense Refill    Belimumab (BENLYSTA IV)       diphenhydrAMINE (BENADRYL) 25 MG tablet  "Take 2-4 tablets by mouth As needed for seizure      hydroxychloroquine (PLAQUENIL) 200 MG tablet Take 1 tablet (200 mg) by mouth 2 times daily 180 tablet 1    magic mouthwash (ENTER INGREDIENTS IN COMMENTS) suspension Guafenesin - 70cc  2% viscous lidocaine - 30cc  Diphenhydramine (12.5/5cc) - 200cc  Nystatin - 30 cc  Sucralfate - 100cc  Maalox - 50cc  Disp: 480 cc(16 oz)  Sig: Use 3 tsp.(15cc), t.i.d. Swish for 3 mins, gargle and expectorate for 2 weeks. Then use prn for maintenance. 480 mL 1    Multiple Vitamin (MULTIVITAMINS PO)          Allergies:  Allergies   Allergen Reactions    Other Environmental Allergy Swelling     Cat & long haired dog dander - eyes swell, hives, airway gets tight - has never needed Epi Pen       Family history:  Family History   Problem Relation Age of Onset    Lupus Maternal Uncle     Sarcoidosis Maternal Aunt        Social history:  Social History     Tobacco Use    Smoking status: Never    Smokeless tobacco: Never   Substance Use Topics    Alcohol use: Not Currently    Marital status: .  Occupation: musician.     Nursing Notes:   Fermin De La Cruz, EMT  12/11/2023 12:42 PM  Sign at exiting of workspace  Chief Complaint   Patient presents with    Hemorrhoids    New Patient       Vitals:    12/11/23 1239   BP: 116/81   BP Location: Left arm   Patient Position: Sitting   Cuff Size: Adult Large   Pulse: 99   SpO2: 98%   Height: 5' 7\"       Body mass index is 32.83 kg/m .     Fermin De La Cruz, EMT- P       25 minutes spent on the date of encounter performing chart review, history and exam, documentation and further activities as noted above with an additional 2 min for anoscopy.           Again, thank you for allowing me to participate in the care of your patient.      Sincerely,    Daisha Ramirez, JAYESH CNP    "

## 2023-12-11 NOTE — NURSING NOTE
"Chief Complaint   Patient presents with    Hemorrhoids    New Patient       Vitals:    12/11/23 1239   BP: 116/81   BP Location: Left arm   Patient Position: Sitting   Cuff Size: Adult Large   Pulse: 99   SpO2: 98%   Height: 5' 7\"       Body mass index is 32.83 kg/m .     Fermin De La Cruz, EMT- P    "

## 2023-12-11 NOTE — PATIENT INSTRUCTIONS
Start a daily fiber supplement such as Citrucel or Metamucil powder. Start with once a day and slowly increase up to three times a day, if needed, over the next 4-6 weeks.     Can use MiraLAX as needed.

## 2023-12-11 NOTE — PROGRESS NOTES
"Patient seen today, 23, with RYLIE Delatorre.  I agree with the dictation and have made any necessary changes.     JAYESH Soliz NP-C  Colon and Rectal Surgery  [facility]     Colon and Rectal Surgery Consult Clinic Note    Date: 2023     Referring provider:  Katherine Villagomez MD  [address] [place] [address]     RE: Dasia Nguyen  : 1985  AUDREY: 2023    Dasia Nguyen is a very pleasant 38 year old female that presents today for external hemorrhoids. Patient reports she notices blood in the toilet bowel or on the toilet paper about once a month. She states occasionally she will have \"zingers\" and \"sharp pain\" in her rectum but that is infrequent and not currently happening. Patient denies any fhx of IBD or colorectal cancer. She states she has 2-3 bowel movements a day and they vary from very soft to hard stools. She does notice she has to strain sometimes with bowel movements. She does not take any fiber. Denies any anal receptive sex.    Physical Examination:  /81 (BP Location: Left arm, Patient Position: Sitting, Cuff Size: Adult Large)   Pulse 99   Ht 5' 7\"   SpO2 98%   BMI 32.83 kg/m    General: alert and oriented sitting comfortably in her chair     Perianal external examination: Exam was chaperoned by BG Soliz  Skin tag in anterior portion. No ulcerations noted. Scaring from possible previous fissures - no current fissure appreciated on exam   Digital rectal examination: Was performed.   Sphincter tone: Good.  Palpable lesions: No.  Other: None.  Bimanual examination: was not performed    Anoscopy: Was performed.   Hemorrhoids: Yes. Grade 1-2 internal hemorrhoid in anterior position and left lateral position not bleeding.   Lesions: No    Assessment/Plan: 38 year old female with grade 1-2 internal hemorrhoids in the anterior position and left lateral position and skin tag in the anterior " position with no bleeding. Patient has a PMH of lupus and would like to avoid any surgery. Discussed with patient that due to her skin tag being so small and not having started treatment options for internal hemorrhoids that we would not suggest surgery at this point. Patient is relieved. Encouraged her to start a daily fiber supplement such as Citrucel or Metamucil powder. Start with one tablespoon once a day and slowly increase up to three times a day, if needed, over the next 4-6 weeks. Discussed with the patient that if symptoms persist we can discuss hemorrhoid banding to help with symptom relief. Patient was agreeable to this plan. Patient is agreeable to this plan. Answered all her questions to her satisfaction. Encouraged to reach out with any additional questions or concerns.         Medical history:  Past Medical History:   Diagnosis Date    Chest pain on breathing     Fatigue     Multiple joint pain     Photosensitivity        Surgical history:  Past Surgical History:   Procedure Laterality Date    IR CHEST PORT PLACEMENT > 5 YRS OF AGE  3/31/2023    NO HISTORY OF SURGERY         Problem list:    Patient Active Problem List    Diagnosis Date Noted    Systemic lupus erythematosus (H) 08/06/2022     Priority: Medium    Chest pain on breathing 11/24/2021     Priority: Medium    Fatigue 11/24/2021     Priority: Medium    Multiple joint pain 11/24/2021     Priority: Medium    Photosensitivity 11/24/2021     Priority: Medium       Medications:  Current Outpatient Medications   Medication Sig Dispense Refill    Belimumab (BENLYSTA IV)       diphenhydrAMINE (BENADRYL) 25 MG tablet Take 2-4 tablets by mouth As needed for seizure      hydroxychloroquine (PLAQUENIL) 200 MG tablet Take 1 tablet (200 mg) by mouth 2 times daily 180 tablet 1    magic mouthwash (ENTER INGREDIENTS IN COMMENTS) suspension Guafenesin - 70cc  2% viscous lidocaine - 30cc  Diphenhydramine (12.5/5cc) - 200cc  Nystatin - 30 cc  Sucralfate -  "100cc  Maalox - 50cc  Disp: 480 cc(16 oz)  Sig: Use 3 tsp.(15cc), t.i.d. Swish for 3 mins, gargle and expectorate for 2 weeks. Then use prn for maintenance. 480 mL 1    Multiple Vitamin (MULTIVITAMINS PO)          Allergies:  Allergies   Allergen Reactions    Other Environmental Allergy Swelling     Cat & long haired dog dander - eyes swell, hives, airway gets tight - has never needed Epi Pen       Family history:  Family History   Problem Relation Age of Onset    Lupus Maternal Uncle     Sarcoidosis Maternal Aunt        Social history:  Social History     Tobacco Use    Smoking status: Never    Smokeless tobacco: Never   Substance Use Topics    Alcohol use: Not Currently    Marital status: .  Occupation: musician.     Nursing Notes:   Fermin De La Cruz, EMT  12/11/2023 12:42 PM  Sign at exiting of workspace  Chief Complaint   Patient presents with    Hemorrhoids    New Patient       Vitals:    12/11/23 1239   BP: 116/81   BP Location: Left arm   Patient Position: Sitting   Cuff Size: Adult Large   Pulse: 99   SpO2: 98%   Height: 5' 7\"       Body mass index is 32.83 kg/m .     Fermin De La Cruz, EMT- P       25 minutes spent on the date of encounter performing chart review, history and exam, documentation and further activities as noted above with an additional 2 min for anoscopy.     Dasia [place] PA-C  Colon and Rectal Surgery   [facility]      "

## 2023-12-13 ENCOUNTER — TELEPHONE (OUTPATIENT)
Dept: RHEUMATOLOGY | Facility: CLINIC | Age: 38
End: 2023-12-13
Payer: COMMERCIAL

## 2023-12-13 NOTE — TELEPHONE ENCOUNTER
LVM to reschedule appointment on 2/8/24 with Dr Herrera. Have appointment on hold for patient on 12/13 at 10:30 with Dr Herrera.

## 2023-12-20 NOTE — PROGRESS NOTES
Adjusted therapy plan to include subcutaneous Benlysta due to IV medication shortage. Informed home infusion of updated orders. Change made via [facility] pharmacist CPA with Felicita Herrera MD. Change approved by David Rothman MD, while Dr. Herrera is out of office.       Juan Erazo, PharmD  Medication Therapy Management Pharmacist  [facility]  Phone: [phone]

## 2023-12-21 NOTE — PROGRESS NOTES
As of 12/21/23, there is an adequate supply of Benlysta vials for IV infusion. Adjusting infusion plan back to IV Benlysta at previously prescribed dose.     Juan Erazo, PharmD  Medication Therapy Management Pharmacist  [facility]  Phone: [phone]

## 2023-12-28 ENCOUNTER — MEDICAL CORRESPONDENCE (OUTPATIENT)
Dept: HEALTH INFORMATION MANAGEMENT | Facility: CLINIC | Age: 38
End: 2023-12-28

## 2024-01-01 ASSESSMENT — SLEEP AND FATIGUE QUESTIONNAIRES
HOW LIKELY ARE YOU TO NOD OFF OR FALL ASLEEP WHILE SITTING AND READING: WOULD NEVER DOZE
HOW LIKELY ARE YOU TO NOD OFF OR FALL ASLEEP WHILE LYING DOWN TO REST IN THE AFTERNOON WHEN CIRCUMSTANCES PERMIT: MODERATE CHANCE OF DOZING
HOW LIKELY ARE YOU TO NOD OFF OR FALL ASLEEP WHILE WATCHING TV: WOULD NEVER DOZE
HOW LIKELY ARE YOU TO NOD OFF OR FALL ASLEEP WHEN YOU ARE A PASSENGER IN A CAR FOR AN HOUR WITHOUT A BREAK: SLIGHT CHANCE OF DOZING
HOW LIKELY ARE YOU TO NOD OFF OR FALL ASLEEP WHILE SITTING INACTIVE IN A PUBLIC PLACE: WOULD NEVER DOZE
HOW LIKELY ARE YOU TO NOD OFF OR FALL ASLEEP WHILE SITTING QUIETLY AFTER LUNCH WITHOUT ALCOHOL: WOULD NEVER DOZE
HOW LIKELY ARE YOU TO NOD OFF OR FALL ASLEEP IN A CAR, WHILE STOPPED FOR A FEW MINUTES IN TRAFFIC: WOULD NEVER DOZE
HOW LIKELY ARE YOU TO NOD OFF OR FALL ASLEEP WHILE SITTING AND TALKING TO SOMEONE: WOULD NEVER DOZE

## 2024-01-03 ENCOUNTER — MEDICAL CORRESPONDENCE (OUTPATIENT)
Dept: HEALTH INFORMATION MANAGEMENT | Facility: CLINIC | Age: 39
End: 2024-01-03
Payer: COMMERCIAL
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
## 2024-01-08 ENCOUNTER — OFFICE VISIT (OUTPATIENT)
Dept: SURGERY | Facility: CLINIC | Age: 39
End: 2024-01-08
Attending: STUDENT IN AN ORGANIZED HEALTH CARE EDUCATION/TRAINING PROGRAM
Payer: COMMERCIAL

## 2024-01-08 ENCOUNTER — TELEPHONE (OUTPATIENT)
Dept: SURGERY | Facility: CLINIC | Age: 39
End: 2024-01-08

## 2024-01-08 VITALS
HEIGHT: 67 IN | HEART RATE: 80 BPM | DIASTOLIC BLOOD PRESSURE: 69 MMHG | WEIGHT: 208.7 LBS | SYSTOLIC BLOOD PRESSURE: 105 MMHG | BODY MASS INDEX: 32.75 KG/M2 | OXYGEN SATURATION: 97 %

## 2024-01-08 VITALS — WEIGHT: 208.7 LBS | HEIGHT: 67 IN | BODY MASS INDEX: 32.75 KG/M2

## 2024-01-08 DIAGNOSIS — K76.0 HEPATIC STEATOSIS: ICD-10-CM

## 2024-01-08 DIAGNOSIS — E66.09 CLASS 1 OBESITY DUE TO EXCESS CALORIES WITH SERIOUS COMORBIDITY AND BODY MASS INDEX (BMI) OF 32.0 TO 32.9 IN ADULT: Primary | ICD-10-CM

## 2024-01-08 DIAGNOSIS — E66.811 CLASS 1 OBESITY DUE TO EXCESS CALORIES WITH SERIOUS COMORBIDITY AND BODY MASS INDEX (BMI) OF 32.0 TO 32.9 IN ADULT: Primary | ICD-10-CM

## 2024-01-08 DIAGNOSIS — R79.89 ELEVATED LFTS: ICD-10-CM

## 2024-01-08 PROCEDURE — 97802 MEDICAL NUTRITION INDIV IN: CPT

## 2024-01-08 PROCEDURE — 99205 OFFICE O/P NEW HI 60 MIN: CPT | Performed by: PHYSICIAN ASSISTANT

## 2024-01-08 NOTE — PATIENT INSTRUCTIONS
"To ensure quality you may receive a patient satisfaction survey. The greatest compliment you can give is \"Likely to Recommend.\"    Nice to talk with you today.  Thank you for your trust. Below is the plan discussed.-  Kiara Moreno PA-C     Plan:  Labs ordered.  Please call [phone] to set up a lab appt.   Start Zepbound:  You'll start at 2.5 mg once weekly for 4 weeks.   If you're tolerating it well, increase to 5 mg once weekly thereafter until I see you again.    You can take over the counter famotidine (Pepcid) 20 mg once or twice daily as needed for nausea/heartburn.      Goals:  I recommend eating breakfast within an hour of waking up and eating every 4-6 hours during the day to keep your metabolism up. Prioritizing veggies and proteins for food choices is also recommended as medically appropriate.  Continue to stay well hydrated. Recommend 64oz (2L) water daily as medically appropriate (6-8 glasses)  Great job with exercising - please continue to invest in yourself with regular exercise. Continue to strive for a range for 150-300 minutes of exercise for weight maintenance and incorporating strength training twice a week to help preserve muscle!      FOLLOW-UP:    Please call [phone] to schedule your next visit in 3 months.     Zepbound (Tirzepatide)    Zepbound (Tirzepatide): is an injectable prescription medicine recently FDA approved for adults with obesity or overweight with an additional condition affected by their weight.      It is given as a shot once a week. It activates the body's receptors for GIP (glucose-dependent insulinotropic polypeptide) and GLP-1 (glucagon-like peptide-1) receptor, two naturally occurring hormones that help tell the brain that you are full. It also works is by slowing down how quickly food leaves your stomach.     You will start to feel mcdaniel more quickly, notice portion changes and eat less often between meals.  Patients are advised to eat slowly and purposefully. " Give yourself less portions. You may find after starting this medication you have a new point of fullness. Maintain consistent eating schedule with meals +/- snacks and get in good hydration.    Dosing:  Initial dose: 2.5 mg injected subcutaneously once weekly for 4 weeks  Further dose changes can include: increase to 5 mg once weekly for 4 weeks, then 7.5 mg once weekly for 4 weeks, then 10 mg once weekly for 4 weeks then 12.5 mg once weekly for 4 weeks, then 15 mg (maximum dose) once weekly.    Dose changes are based on how you are tolerating the current dose, what benefits you are seeing at the current dose and if improvement in effectiveness of the drug is needed. The goal is to find the dose that works best for you with the least amount of side effects. You may find you reach this at a lower dose than the maximum dose.     Side effects: Common side effects include nausea, Heartburn,diarrhea, constipation. This is worse when starting the medication and with dose dose escalation.  It does improve with time. Stay adequately hydrated and eat small portions to decrease the risk of side effects.     The risk of pancreatitis (inflammation of the pancreas) has been associated with this type of medication, but is very rare.  If you have had pancreatitis in the past, this medication may not be for you. If you experience persistent severe abdominal pain (sometimes radiating to the back potentially accompanied by vomiting and have a fever), stop the medication and contact your provider immediately for assessment. They will do a blood test to check for pancreatitis.       Caution:   Tirzepatide (Zepbound, Mounjaro) May decrease effectiveness of your oral birth control while starting and with dose adjustments.  Use backup forms of birth control if necessary.      For any questions or concerns please send a [employer] message to our team or call our weight management call center at [phone] during regular business hours.      There is a small chance you may have some low blood sugar after taking the medication.   The signs of low blood sugar are:  Weakness  Shaky   Hungry  Sweating  Confusion      See below for ways to treat low blood sugar without adding in lots of extra calories.      Treating Low Blood Sugar    If you have symptoms of low blood sugar (sweating, shaking, dizzy, confused) eat 15 grams of carbs and wait 15 minutes:    Glucose Tabs are best for sugars under 70 -  Dex4 or BD Glucose tablets are good, you will need to take 3-4 of these to equal 15 grams.     One small box of raisins  4 oz fruit juice box or   cup fruit juice  1 small apple  1 small banana    cup canned fruit in water    English muffin or a slice of bread with jelly   1 low fat frozen waffle with sugar-free syrup    cup cottage cheese with   cup frozen or fresh blueberries  1 cup skim or low-fat milk    cup whole grain cereal  4-6 crackers such as Triscuits      This medication is usually not covered by insurance and can be quite expensive. Sometimes a prior authorization is required, which may take up to 1-2 weeks for an insurance company to make a decision if they will cover the medication. Please be patient, you will be notified after a decision has been made.    Contact the nurse via [employer] or call [phone] if you have any questions or concerns. (Do not stop taking it if you don't think it's working. For some people it works without them knowing it.)     In order to get refills of this or any medication we prescribe you must be seen in the medical weight mgmt clinic every 2-4 months.    How to Inject (for Mounjaro which is the same for Zepbound):   For Video: [email or web address]://www.BugSense/how-to-use-mounjaro    For Instructional Sheet: https://uspl.paty.com/mounjaro/mounjaro.html#ug0

## 2024-01-08 NOTE — ASSESSMENT & PLAN NOTE
Initial MWM. Added a couple baseline labs. Referred to dietitians. Needs to increase eating frequency. Plan to try for Zepbound.

## 2024-01-08 NOTE — TELEPHONE ENCOUNTER
"Central Prior Authorization Team   Phone: [phone]         [facility] Med Adv  (Newest Message First)  View All Conversations on this Encounter  Mita Urbina, RN  [facility]3 minutes ago (2:43 PM)     EVELYN YOUNGBLOOD  tirzepatide-Weight Management (ZEPBOUND) 2.5 MG/0.5ML prefilled pen 2 mL 1 1/8/2024 - --  Sig - Route: Inject 0.5 mLs (2.5 mg) Subcutaneous every 7 days    Disp Refills Start End ANDREW  tirzepatide-Weight Management (ZEPBOUND) 5 MG/0.5ML prefilled pen 2 mL 1 1/8/2024 - --  Sig - Route: Inject 0.5 mLs (5 mg) Subcutaneous every 7 days     Mita Urbina RN3 minutes ago (2:43 PM)     EVELYN  Prior Authorization Retail Medication Request     Medication/Dose:     Disp Refills Start End ANDREW   tirzepatide-Weight Management (ZEPBOUND) 2.5 MG/0.5ML prefilled pen 2 mL 1 1/8/2024 -- --   Sig - Route: Inject 0.5 mLs (2.5 mg) Subcutaneous every 7 days        Disp Refills Start End ANDREW   tirzepatide-Weight Management (ZEPBOUND) 5 MG/0.5ML prefilled pen 2 mL 1 1/8/2024 -- --   Sig - Route: Inject 0.5 mLs (5 mg) Subcutaneous every 7 days      ICD code (if different than what is on RX):  [E66.09, Z68.32]   Previously Tried and Failed:  diet and lifestyle  Rationale:  weight management     Hx of obesity  1/8/24    5'7\"  Body mass index is 32.69 kg/m .   Current Weight:  208.7 lb (94.7 kg)      Insurance Name:  MEDICAID [place]   Insurance ID:  [id number]         Pharmacy Information (if different than what is on RX)  Name:    [id number] - [place], [place] - [address] AT [facility] [address]      Phone:    [phone]                       Note       "

## 2024-01-08 NOTE — PATIENT INSTRUCTIONS
"Gilberto Rossi!        It was great meeting with you today! Here are some links to the handouts I referenced:        Protein Sources:  [email or web address]     Fiber Sources:  [email or web address]     My Plate:  https://www.choosemyplate.gov/        A helpful search term to type into [employer], [employer], etc is \"myplate examples.\" [myplate examples - Google Search]     Key points from today:  Eat 3 meals/day at consistent intervals  Shoot for 60-90 protein (20-30g/meal)  Aim for 25-35g fiber (5-10g/meal)  Eat slowly: 20-30 minutes per meal     Here is a summary of the goals that we discussed:     1. Eat 3 meals/day   - Have your first meal within 1 hour, then eat every 4-6 hours           Let's plan on following up in 1-2 months. This can be scheduled via our call center at . Of course, reach out sooner if you have any questions or concerns. Have a great week and welcome to the program!        Kezia Shaw RD, LD  Clinical Dietitian   "

## 2024-01-08 NOTE — PROGRESS NOTES
MEDICAL WEIGHT LOSS INITIAL EVALUATION  DIAGNOSIS:  Obesity Class I    NUTRITION HISTORY:  Diet and exercise history per pre-visit questionnaire as follows:    Based on your typical week, how often do you do the following?    Generally, my meals include foods like these: bread, pasta, rice, potatoes, corn, crackers, sweet dessert, pop, or juice. Less Than Weekly   Generally, my meals include foods like these: fried meats, brats, burgers, french fries, pizza, cheese, chips, or ice cream. Less Than Weekly   Eat fast food (like [employer], [employer], [employer]). Less Than Weekly   Eat at a buffet or sit-down restaurant. Never   Eat most of my meals in front of the TV or computer. Less Than Weekly   Often skip meals, eat at random times, have no regular eating times. A Few Times a Week   Rarely sit down for a meal but snack or graze throughout. Less Than Weekly   Eat extra snacks between meals. A Few Times a Week   Eat most of my food at the end of the day. Almost Everyday   Eat in the middle of the night or wake up at night to eat. Never   Eat extra snacks to prevent or correct low blood sugar. Never   Eat to prevent acid reflux or stomach pain. Never   Worry about not having enough food to eat. Never   Have you been to the food shelf at least a few times this year? Yes   Please answer the following questions based on your eating patterns during a typical week.    I eat when I am depressed. Less Than Weekly   I eat when I am stressed. Less Than Weekly   I eat when I am bored. Less Than Weekly   I eat when I am anxious. Less Than Weekly   I eat when I am happy or as a reward. Less Than Weekly   I feel hungry all the time even if I just have eaten. Less Than Weekly   Feeling full is important to me. Never   I finish all the food on my plate even if I am already full. Never   I can't resist eating delicious food or walk past the good food/smell. Less Than Weekly   I eat/snack without noticing that I am eating. Less Than  Weekly   I eat when I am preparing the meal. Never   I eat more than usual when I see others eating. Never   I have trouble not eating sweets, ice cream, cookies, or chips if they are around the house. Never   I think about food all day. Never   What foods, if any, do you crave? Sweets/Candy/Chocolate   Please list any other foods you crave?    Please answer the following questions regarding the amount of food you have eaten over the past 6 months.    I feel out of control when eating. Never   I eat a large amount of food, like a loaf of bread, a box of cookies, a pint/quart of ice cream, all at once. Never   I eat a large amount of food even when I am not hungry. Never   I eat rapidly. Never   I eat alone because I feel embarrassed and do not want others to see how much I have eaten. Never   I eat until I am uncomfortably full. Never   I feel bad, disgusted, or guilty after I overeat. Never   I make myself vomit what I have eaten or use laxatives to get rid of food. Never   Please answer the following questions regarding if you usually eat a meal or not.  Please list all foods where appropriate. There are no wrong or right foods.      Do you typically eat breakfast? No   Do you typically eat lunch? No   Do you typically eat supper? Yes   If you do eat supper, what types of food do you typically eat? Cottage cheese, chicken sausage brown rice, black beans,   Do you typically eat snacks? Yes   If you do snack, what types of food do you typically eat? Cheese stick, chips   Do you like vegetables? Yes   Do you drink water? Yes   How many glasses of juice do you drink in a typical day? 0   How many of glasses of milk do you drink in a typical day? 0   How many 8oz glasses of sugar containing drinks such as Rohith-Aid/sweet tea do you drink in a day? 0   How many cans/bottles of sugar pop/soda/tea/sports drinks do you drink in a day? 0   How many cans/bottles of diet pop/soda/tea or sports drink do you drink in a day? 1  "  How often do you have a drink of alcohol? Never   Please answer these questions based on a typical 12 hour day including time at work and home.    How much of a typical 12 hour day do you spend sitting? Less Than Half the Day   How much of a typical 12 hour day do you spend lying down?    How much of a typical day do you spend walking/standing? Half the Day   How many hours (not including work) do you spend on the TV/Video Games/Computer/Tablet/Phone? 2-3 Hours   How many times a week are you active for the purpose of exercise? 2-3 Times a Week   What keeps you from being more active? Pain   How many total minutes do you spend doing some activity for the purpose of exercising when you exercise? More Than 30 Minutes     Additional Information: Pt believes weight gain secondary to autoimmune conditions/meds (Lupus) and several surgeries over the last decade. Pt w/celiac dz; has never met with RD to discuss in-depth. Has been intermittent fasting but not getting the results she expected. NKFA but avoids soy per GI doctor and reports occasional dairy sensitivity. 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     ANTHROPOMETRICS:  Height: 5' 7\"   Weight: 208 lbs 11.2 oz  BMI:  32.69 kg/m2  NUTRITION DIAGNOSIS:   Obese class I related to overeating and poor lifestyle habits as evidence by patient's subjective statements and  BMI of 32.69 kg/m2   NUTRITION INTERVENTIONS  Nutrition Prescription:  Recommend modified energy- nutrient intake  Implementation:  Nutrition Education (Content):  Discussed portion sizes and plate method  Provided: Tips for Weight Loss and Weight Management, Protein Sources for Weight Loss, Fiber Content in Food, Plate Method    Nutrition Education (Application):   Patient to practice goals as stated below  Patient verbalizes understanding of diet by stating she will eat 3 meals/day.  Anticipate good compliance    Goals:  Eat 3 meals/day      FOLLOW UP AND " MONITORING:    Other  - follow up in 4-8 weeks.     TIME SPENT WITH PATIENT:   35 minutes        Kezia Shaw RD, LD  Clinical Dietitian

## 2024-01-09 ENCOUNTER — TELEPHONE (OUTPATIENT)
Dept: SURGERY | Facility: CLINIC | Age: 39
End: 2024-01-09
Payer: COMMERCIAL

## 2024-01-09 DIAGNOSIS — E66.811 CLASS 1 OBESITY DUE TO EXCESS CALORIES WITH SERIOUS COMORBIDITY AND BODY MASS INDEX (BMI) OF 32.0 TO 32.9 IN ADULT: Primary | ICD-10-CM

## 2024-01-09 DIAGNOSIS — E66.09 CLASS 1 OBESITY DUE TO EXCESS CALORIES WITH SERIOUS COMORBIDITY AND BODY MASS INDEX (BMI) OF 32.0 TO 32.9 IN ADULT: Primary | ICD-10-CM

## 2024-01-09 RX ORDER — SEMAGLUTIDE 1 MG/.5ML
1 INJECTION, SOLUTION SUBCUTANEOUS WEEKLY
Qty: 2 ML | Refills: 1 | Status: SHIPPED | OUTPATIENT
Start: 2024-01-09 | End: 2024-02-22

## 2024-01-09 RX ORDER — SEMAGLUTIDE 0.25 MG/.5ML
0.25 INJECTION, SOLUTION SUBCUTANEOUS WEEKLY
Qty: 2 ML | Refills: 0 | Status: SHIPPED | OUTPATIENT
Start: 2024-01-09 | End: 2024-02-22

## 2024-01-09 RX ORDER — SEMAGLUTIDE 0.5 MG/.5ML
0.5 INJECTION, SOLUTION SUBCUTANEOUS WEEKLY
Qty: 2 ML | Refills: 1 | Status: SHIPPED | OUTPATIENT
Start: 2024-01-09 | End: 2024-02-22

## 2024-01-09 NOTE — TELEPHONE ENCOUNTER
Prior Authorization Retail Medication Request    Medication/Dose: PA:  Semaglutide-Weight Management (WEGOVY) 0.25 MG/0.5ML pen 2 mL 0 1/9/2024 - --  Sig - Route: Inject 0.25 mg Subcutaneous once a week For 4 weeks - Subcutaneous  Semaglutide-Weight Management (WEGOVY) 0.5 MG/0.5ML pen 2 mL 1 1/9/2024 - --  Sig - Route: Inject 0.5 mg Subcutaneous once a week - Subcutaneous  Semaglutide-Weight Management (WEGOVY) 1 MG/0.5ML pen 2 mL 1 1/9/2024 - --  Sig - Route: Inject 1 mg Subcutaneous once a week - Subcutaneous    Diagnosis and ICD code (if different than what is on RX): [E66.09, Z68.32]   New/renewal/insurance change PA/secondary ins. PA: NEW  Previously Tried and Failed: Diet/lifestyle, has many comorbid conditions and on medications which are activating for weight gain  Rationale:  Weight management     Insurance   Primary: MEDICAID [place] - pt aware [employer] will not cover  Insurance ID:  [id number]     Pharmacy Information (if different than what is on RX)  Name:  [id number]  [place], [place] - [address] [facility] [address]   Phone:  [phone]   Fax:     [phone]

## 2024-01-09 NOTE — TELEPHONE ENCOUNTER
Received pt call re: Wegovy supply    Pt states it is not currently available at pharmacy in the starter doses.    Is wondering if the pharmacy that [facility] uses would be able to ship medication to patient.    Writer encouraged pt to check with [facility] and let clinic know.    Pt will verify if she would be able to transfer and will notify clinic.    Kalee MCKEON RN

## 2024-01-11 ENCOUNTER — LAB (OUTPATIENT)
Dept: LAB | Facility: OTHER | Age: 39
End: 2024-01-11
Payer: COMMERCIAL

## 2024-01-11 ENCOUNTER — TELEPHONE (OUTPATIENT)
Dept: SURGERY | Facility: CLINIC | Age: 39
End: 2024-01-11

## 2024-01-11 DIAGNOSIS — K76.0 HEPATIC STEATOSIS: ICD-10-CM

## 2024-01-11 DIAGNOSIS — E66.09 CLASS 1 OBESITY DUE TO EXCESS CALORIES WITH SERIOUS COMORBIDITY AND BODY MASS INDEX (BMI) OF 32.0 TO 32.9 IN ADULT: ICD-10-CM

## 2024-01-11 DIAGNOSIS — R79.89 ELEVATED LFTS: ICD-10-CM

## 2024-01-11 DIAGNOSIS — M32.9 SYSTEMIC LUPUS ERYTHEMATOSUS, UNSPECIFIED SLE TYPE, UNSPECIFIED ORGAN INVOLVEMENT STATUS (H): ICD-10-CM

## 2024-01-11 DIAGNOSIS — E66.811 CLASS 1 OBESITY DUE TO EXCESS CALORIES WITH SERIOUS COMORBIDITY AND BODY MASS INDEX (BMI) OF 32.0 TO 32.9 IN ADULT: ICD-10-CM

## 2024-01-11 LAB
ALBUMIN MFR UR ELPH: <6 MG/DL
ALBUMIN SERPL BCG-MCNC: 4.2 G/DL (ref 3.5–5.2)
ALBUMIN UR-MCNC: NEGATIVE MG/DL
ALP SERPL-CCNC: 115 U/L (ref 40–150)
ALT SERPL W P-5'-P-CCNC: 77 U/L (ref 0–50)
APPEARANCE UR: CLEAR
AST SERPL W P-5'-P-CCNC: 40 U/L (ref 0–45)
BASOPHILS # BLD AUTO: 0 10E3/UL (ref 0–0.2)
BASOPHILS NFR BLD AUTO: 1 %
BILIRUB DIRECT SERPL-MCNC: <0.2 MG/DL (ref 0–0.3)
BILIRUB SERPL-MCNC: 0.3 MG/DL
BILIRUB UR QL STRIP: NEGATIVE
COLOR UR AUTO: YELLOW
CREAT SERPL-MCNC: 0.85 MG/DL (ref 0.51–0.95)
CREAT UR-MCNC: 111.4 MG/DL
CRP SERPL-MCNC: 5.92 MG/L
EGFRCR SERPLBLD CKD-EPI 2021: >90 ML/MIN/1.73M2
EOSINOPHIL # BLD AUTO: 0.3 10E3/UL (ref 0–0.7)
EOSINOPHIL NFR BLD AUTO: 4 %
ERYTHROCYTE [DISTWIDTH] IN BLOOD BY AUTOMATED COUNT: 12.4 % (ref 10–15)
ERYTHROCYTE [SEDIMENTATION RATE] IN BLOOD BY WESTERGREN METHOD: 16 MM/HR (ref 0–20)
GLUCOSE UR STRIP-MCNC: NEGATIVE MG/DL
HBA1C MFR BLD: 5.6 % (ref 0–5.6)
HCT VFR BLD AUTO: 40.5 % (ref 35–47)
HGB BLD-MCNC: 13.4 G/DL (ref 11.7–15.7)
HGB UR QL STRIP: ABNORMAL
IMM GRANULOCYTES # BLD: 0 10E3/UL
IMM GRANULOCYTES NFR BLD: 1 %
KETONES UR STRIP-MCNC: NEGATIVE MG/DL
LEUKOCYTE ESTERASE UR QL STRIP: NEGATIVE
LYMPHOCYTES # BLD AUTO: 2.6 10E3/UL (ref 0.8–5.3)
LYMPHOCYTES NFR BLD AUTO: 35 %
MCH RBC QN AUTO: 28.9 PG (ref 26.5–33)
MCHC RBC AUTO-ENTMCNC: 33.1 G/DL (ref 31.5–36.5)
MCV RBC AUTO: 88 FL (ref 78–100)
MONOCYTES # BLD AUTO: 0.5 10E3/UL (ref 0–1.3)
MONOCYTES NFR BLD AUTO: 6 %
NEUTROPHILS # BLD AUTO: 4.1 10E3/UL (ref 1.6–8.3)
NEUTROPHILS NFR BLD AUTO: 55 %
NITRATE UR QL: NEGATIVE
PH UR STRIP: 6.5 [PH] (ref 5–7)
PLATELET # BLD AUTO: 350 10E3/UL (ref 150–450)
PROT SERPL-MCNC: 7.1 G/DL (ref 6.4–8.3)
PROT/CREAT 24H UR: NORMAL MG/G{CREAT}
RBC # BLD AUTO: 4.63 10E6/UL (ref 3.8–5.2)
RBC #/AREA URNS AUTO: ABNORMAL /HPF
SP GR UR STRIP: 1.02 (ref 1–1.03)
SQUAMOUS #/AREA URNS AUTO: ABNORMAL /LPF
UROBILINOGEN UR STRIP-ACNC: 0.2 E.U./DL
VIT D+METAB SERPL-MCNC: 30 NG/ML (ref 20–50)
WBC # BLD AUTO: 7.5 10E3/UL (ref 4–11)
WBC #/AREA URNS AUTO: ABNORMAL /HPF

## 2024-01-11 PROCEDURE — 80076 HEPATIC FUNCTION PANEL: CPT

## 2024-01-11 PROCEDURE — 86140 C-REACTIVE PROTEIN: CPT

## 2024-01-11 PROCEDURE — 83036 HEMOGLOBIN GLYCOSYLATED A1C: CPT

## 2024-01-11 PROCEDURE — 85025 COMPLETE CBC W/AUTO DIFF WBC: CPT

## 2024-01-11 PROCEDURE — 82565 ASSAY OF CREATININE: CPT

## 2024-01-11 PROCEDURE — 86225 DNA ANTIBODY NATIVE: CPT

## 2024-01-11 PROCEDURE — 36415 COLL VENOUS BLD VENIPUNCTURE: CPT

## 2024-01-11 PROCEDURE — 81001 URINALYSIS AUTO W/SCOPE: CPT

## 2024-01-11 PROCEDURE — 82306 VITAMIN D 25 HYDROXY: CPT

## 2024-01-11 PROCEDURE — 84156 ASSAY OF PROTEIN URINE: CPT

## 2024-01-11 PROCEDURE — 85652 RBC SED RATE AUTOMATED: CPT

## 2024-01-11 PROCEDURE — 86160 COMPLEMENT ANTIGEN: CPT

## 2024-01-11 NOTE — TELEPHONE ENCOUNTER
PA Initiation    Medication: ZEPBOUND 2.5 MG/0.5ML SC SOAJ  Insurance Company: [employer] Non-Specialty PA's - Phone [phone] Fax [phone]  Pharmacy Filling the Rx: [facility] #[id number] [place], [place] - [address] [facility] [address]  Filling Pharmacy Phone: [phone]  Filling Pharmacy Fax:    Start Date: 1/11/2024  Retail Pharmacy Prior Authorization Team   Phone: [phone]

## 2024-01-11 NOTE — TELEPHONE ENCOUNTER
PA Initiation    Medication: ZEPBOUND 2.5 MG/0.5ML SC SOAJ  Insurance Company: [place] Medicaid ([facility]) - Phone [phone] Fax [phone]  Pharmacy Filling the Rx: [facility] #[id number] [place], [place] - [address] AT [facility] [address]  Filling Pharmacy Phone: [phone]  Filling Pharmacy Fax:    Start Date: 1/11/2024  Retail Pharmacy Prior Authorization Team   Phone: [phone]

## 2024-01-12 LAB
C3 SERPL-MCNC: 155 MG/DL (ref 81–157)
C4 SERPL-MCNC: 38 MG/DL (ref 13–39)
DSDNA AB SER-ACNC: 4.9 IU/ML

## 2024-01-13 NOTE — TELEPHONE ENCOUNTER
PRIOR AUTHORIZATION DENIED I called the pharmacy to get screenshot of primary payor to submit, and the pharmacist told me fatoumata the prescription was discontinued bu the provider.      Medication: ZEPBOUND 2.5 MG/0.5ML SC SOAJ  Insurance Company: [place] Medicaid ([facility]) - Phone [phone] Fax [phone]  Denial Date: 1/12/2024  Denial Reason(s):     Appeal Information: None available  Patient Notified:This prescription was discontinued by provider per [facility].

## 2024-01-15 NOTE — TELEPHONE ENCOUNTER
PRIOR AUTHORIZATION DENIED    Medication: WEGOVY 0.25 MG/0.5ML SC SOAJ  Insurance Company: [employer] Non-Specialty PA's - Phone [phone] Fax [phone]  Denial Date: 1/15/2024  Denial Reason(s):     Drug is excluded from plan.        Appeal Information: N/A      Patient Notified: No

## 2024-01-16 NOTE — TELEPHONE ENCOUNTER
Received primary paid claim for PA submission to [place] Medicaid. Created new PA encounter for documentation purposes. Please see other encounter for status/outcome.

## 2024-01-17 ENCOUNTER — MEDICAL CORRESPONDENCE (OUTPATIENT)
Dept: HEALTH INFORMATION MANAGEMENT | Facility: CLINIC | Age: 39
End: 2024-01-17
Payer: COMMERCIAL

## 2024-01-19 DIAGNOSIS — E66.811 CLASS 1 OBESITY DUE TO EXCESS CALORIES WITH SERIOUS COMORBIDITY AND BODY MASS INDEX (BMI) OF 32.0 TO 32.9 IN ADULT: Primary | ICD-10-CM

## 2024-01-19 DIAGNOSIS — E66.09 CLASS 1 OBESITY DUE TO EXCESS CALORIES WITH SERIOUS COMORBIDITY AND BODY MASS INDEX (BMI) OF 32.0 TO 32.9 IN ADULT: Primary | ICD-10-CM

## 2024-01-26 ENCOUNTER — OFFICE VISIT (OUTPATIENT)
Dept: RHEUMATOLOGY | Facility: CLINIC | Age: 39
End: 2024-01-26
Payer: COMMERCIAL

## 2024-01-26 VITALS
HEIGHT: 67 IN | WEIGHT: 208 LBS | HEART RATE: 85 BPM | BODY MASS INDEX: 32.65 KG/M2 | DIASTOLIC BLOOD PRESSURE: 74 MMHG | OXYGEN SATURATION: 98 % | SYSTOLIC BLOOD PRESSURE: 108 MMHG

## 2024-01-26 DIAGNOSIS — M32.9 SYSTEMIC LUPUS ERYTHEMATOSUS, UNSPECIFIED SLE TYPE, UNSPECIFIED ORGAN INVOLVEMENT STATUS (H): Primary | ICD-10-CM

## 2024-01-26 DIAGNOSIS — L93.2 CUTANEOUS LUPUS ERYTHEMATOSUS: ICD-10-CM

## 2024-01-26 DIAGNOSIS — R76.8 POSITIVE DOUBLE STRANDED DNA ANTIBODY TEST: ICD-10-CM

## 2024-01-26 PROCEDURE — 99214 OFFICE O/P EST MOD 30 MIN: CPT | Performed by: STUDENT IN AN ORGANIZED HEALTH CARE EDUCATION/TRAINING PROGRAM

## 2024-01-26 RX ORDER — ZOSTER VACCINE RECOMBINANT, ADJUVANTED 50 MCG/0.5
1 KIT INTRAMUSCULAR ONCE
Qty: 0.5 ML | Refills: 0 | Status: SHIPPED | OUTPATIENT
Start: 2024-01-26 | End: 2024-01-26

## 2024-01-26 RX ORDER — PNEUMOCOCCAL 20-VALENT CONJUGATE VACCINE 2.2; 2.2; 2.2; 2.2; 2.2; 2.2; 2.2; 2.2; 2.2; 2.2; 2.2; 2.2; 2.2; 2.2; 2.2; 2.2; 4.4; 2.2; 2.2; 2.2 UG/.5ML; UG/.5ML; UG/.5ML; UG/.5ML; UG/.5ML; UG/.5ML; UG/.5ML; UG/.5ML; UG/.5ML; UG/.5ML; UG/.5ML; UG/.5ML; UG/.5ML; UG/.5ML; UG/.5ML; UG/.5ML; UG/.5ML; UG/.5ML; UG/.5ML; UG/.5ML
0.5 INJECTION, SUSPENSION INTRAMUSCULAR ONCE
Qty: 0.5 ML | Refills: 0 | Status: SHIPPED | OUTPATIENT
Start: 2024-01-26 | End: 2024-01-26

## 2024-01-26 RX ORDER — HYDROXYCHLOROQUINE SULFATE 200 MG/1
200 TABLET, FILM COATED ORAL 2 TIMES DAILY
Qty: 180 TABLET | Refills: 1 | Status: SHIPPED | OUTPATIENT
Start: 2024-01-26 | End: 2024-06-04

## 2024-01-26 ASSESSMENT — PAIN SCALES - GENERAL: PAINLEVEL: MODERATE PAIN (5)

## 2024-01-26 NOTE — PROGRESS NOTES
"    Rheumatology Visit     Dasia Nguyen MRN# [id number]   YOB: 1985 Age: 36 year old     Date of Visit: Jan 26, 2024   Primary care provider: Kaushik Luna          Assessment and Plan:     Assessment     Systemic lupus - ( cutaneous lupus, arthralgia, oral sores, photosensitivity, +dsDNA )  Facial rash - cutaneous lupus - seen dermatologist  Recurrent oral sores - resolved on Benlysta  Chest pain - resolved  Fatigue  Headaches  Transaminitis  Neg AYAN, RF, ACPA  +dsDNA - 11, Neg SSA/SSB, centromere, Scl-70, Sm.     Ms. Nguyen is 38 year old female seen for management of systemic lupus.    Positive dsDNA : Since 2021 she had multiple symptoms including fatigue, joint pains, facial rash, photosensitivity, chest pain, recurrent oral sores. Her autoimmune work up showed negative AYAN but had mildly elevated dsDNA.  She had episodes of malar rash which was diagnosed as acute cutaneous lupus by dermatologist at \"[facility] \". On exam she had mild tenderness over MCP, PIP joints, ankles and MTP joints but no synovitis noted.     She was started on Plaquenil in 12/2021 and it helped little. Her repeat autoimmune serologies in 7/2022 showed elevated dsDNA of 18, normal C3/C4, AYAN, CBC, CMP. Due to worsening symptoms she was started on Methotrexate and was up to 6 tab once a week, but developed elevated LFT's and was discontinued.     Benlysta infusions were started in 12/2022. She has noted 80 % improvement after starting Infusions. Facial rash, oral sores, joint pains have reduced.  Intermittent flares have reduced.  Her last flare was in 11/23 when she had extreme exhaustion, malar rash and oral sores.  She is feeling much better now.  dsDNA levels have normalized after starting Benlysta infusions.      Will continue Benlysta infusions monthly and Plaquenil 400 mg daily.  Her updated labs done on 1/11/2023 have shown normal CBC, CMP, ESR, CRP, dsDNA, C3/C4, urine analysis and protein " random urine.     Transaminitis: She has chronically elevated transaminases.  Follows with Dr. Caruso in hepatology.  Hepatic ultrasound done in 9/23 showed increased diffuse liver echogenicity consistent with fatty liver.  Negative F-actin, hepatitis A, hepatitis B, tissue transglutaminase antibody.     Seizure like episodes : Seen by Neurology, and EEG is normal. Her seizures are not related to Epilepsy or seizure disorder.  She uses as needed Benadryl for seizure-like episodes and it helps.    Vaccinations: Vaccinations reviewed with Ms. Nguyen. Risks and benefits of vaccinations were discussed.  - Influenza: encouraged yearly vaccination  -Prevnar 20 : Recommend getting Prevnar 20 vaccine.  Prescription sent  - Zostavax: She had 2 episodes of shingles at age 9 and at college.  Recommend getting Shingrix vaccine.  Prescription sent  -COVID: Recommend getting it    High risk medication use : Monitoring labs done on 1/11/2024 showed normal CBC, AST, ALT, creatinine, ESR, CRP.   Labs to be done every 8 to 12 weeks.  She had Plaquenil eye exam in summer 2023 which was normal.    Plan    Continue Plaquenil 400 mg daily     Continue Benlysta infusions every 4 weeks     Blood tests orders placed to be done every 3 months    Follow up in 6 - 8 months with labs.  She will establish with provider at [place]    -- Orders placed this encounter  Orders Placed This Encounter   Procedures    CBC with platelets    AST    ALT    Albumin level    Creatinine    DNA ANTIBODY, NATV/2 STRAND    Complement C3    Complement C4    CRP inflammation    Erythrocyte sedimentation rate auto       TT 30 min was spent on date of the encounter doing chart review, history and exam, documentation and further activities as noted above. Any prior notes, outside records, laboratory results, and imaging studies were reviewed if relevant.    Patient verbalized agreement with and understanding of the rationale for the diagnosis and treatment  plan.  All questions were answered to best of my ability and the patient's satisfaction.      Chart documentation done in part with Dragon Voice recognition Software. Although reviewed after completion, some word and grammatical error may remain.                  Active Problem List:     Patient Active Problem List    Diagnosis Date Noted    Class 1 obesity due to excess calories with serious comorbidity and body mass index (BMI) of 32.0 to 32.9 in adult 01/08/2024     Priority: Medium    Hepatic steatosis 01/08/2024     Priority: Medium    Systemic lupus erythematosus (H) 08/06/2022     Priority: Medium    Chest pain on breathing 11/24/2021     Priority: Medium    Fatigue 11/24/2021     Priority: Medium    Multiple joint pain 11/24/2021     Priority: Medium    Photosensitivity 11/24/2021     Priority: Medium            History of Present Illness:   Dasia Nguyen is a 38 year old female with PMH of facial rash, joint pains, chest pain, +dsdna seen in the clinic for management of systemic lupus.     History from initial visit  : She was in her usual state of health up until 3 years ago when she started noticing fatigue, joint pains, on and off chest pains. Her symptoms came as flare ups when all her symptoms would increase many folds. Her last severe flare was summer 2021 She had severe body aches, joint pains and could not get out of bed for couple weeks. In addition she was extremely fatigued and had headaches, dizziness, chest pains and oral sores. She also noticed being very photosensitive and developed facial rash. She saw dermatologist at [facility] who diagnosed her with acute cutaneous lupus.     Beginning May 2021, she started having seizures like episodes at night. She describes it as hot tingling sensation and her body will shake for 15 - 20 sec. She has fallen out of bed during those episodes. She was seen by Neurologist and had EEG which did not show epilepsy. MRI head, C-spine, T-spine  did not show demyelinated lesions. She was referred to cardiology to make sure that her symptoms are not related to Arrhythmias. Echocardiogram was normal. CT chest showed some calcification in the region of LAD.     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 Her ankles will hurt. In the morning she does not have strength to grasp things. She gets pain in her wrists, elbows. She has fatigue and feel she has flu every day. She has headaches every day. She takes naps during the day. Her chest hurt, feel as if someone is stepping on it. She always take shallow breaths. She was given medrol dos russ and responded well to it.     She has herniated discs in her back and always had chronic low back pain.    She gets recurrent UTI and possibly had kidney stones.     Autoimmune work up showed Neg AYAN, KIANNA, RF, ACPA but positive dsDNA. She had mildly elevated ESR of 26 in 6/2021.   Denies any raynauds, sicca symptoms, recurrent sinusitis/rhinitis, swallowing difficulty, hearing or visual changes recently. No h/o arterial/venous thrombosis in the past. Hx of one 5 week pregnancy loss.    July 6, 2022 - Plaquenil has helped with her facial rash. 2 months ago she started having seizures. When she is on flare up she gets seizures 5 hours straight. Whole body is so tired, she is sleeping 13 hours a day. She has had EEG which was normal. She was told to take Benadryl when it will happen.  She is shaking, she does not have control, hitting herself. She has overall feeling of being sick, can not get out of bed.  She has 5 kids. She has feeling of being sick all the time. She has pain in her joints. She gets pain in her thumb joints, pinky, big toes. She has noticed that when she is very tired she gets flutters in her chest. She gets oral sores, big ones on the inner lip, right nostril. She has tried prednisone and felt that it helped with inflammation, joint aches. Every couple months she gets very sick. When  she is walking a lot she gets swelling and puffiness in her ankles. She takes Ibuprofen as needed.   September 8, 2022 - She has not had facial rash and oral sores in a while. She feel that her fingers are not hurting. She feel that her chest is very tight. She is on Methotrexate 6 tab once a week for 7 weeks. She is on Plaquenil 200 mg twice daily. She is off of prednisone. She still get seizure like symptoms and use Benadryl as needed for these symptoms. She gets numbness in her arms when she lie down.   March 24, 2023 - She was throwing up all night last night. If she is laying on her back then she gets Abdominal pain and nausea. She is on Benlysta infusions monthly since 12/2022 and Plaquenil 400 mg daily for SLE. She has overall noticed improvement on Benlysta. She has more good days than bad days. Facial rash has resolved. A month ago she had flare up and had facial rash as well. Oral sores have reduced in frequency. She has scheduled appointment for eye exam in April 2023.  August 3, 2023 - She feel very fatigued and gets flu like illness.  Joints in her toes ache. Benlysta has helped with her joint pains. After her last infusion she was very sick and had couple days of seizures like episode. She was very tired, nauseated and sick. She does not get mouth ulcers now on Benlysta. Malar rash has resolved on her face. When she is sun, photosensitivity has reduced on Benlysta. She gets hot flashes and palpitations sometimes. She is on Plaquenil 400 mg daily and is due for HCQ eye exam. She takes Benadryl as needed for seizures.   January 26, 2024 -She is on Benlysta infusion monthly and is doing very well.  It has helped her more than 80%.  Overall lupus flares have reduced in frequency.  Her last flare was in 11/23 when she had extreme fatigue, exhaustion, arthralgia, malar rash.  Overall oral sores, skin rashes, arthralgia have significantly improved on Benlysta infusions.  Denies any side effects on Benlysta.  She is getting infusions at home. When in flare her fingers hurt. Sometimes she has pain in her wrist and 5th digits. She still gets fatigued easily. She still get seizures like symptoms when she is extremely tired. When she has sun exposure she gets bad rash on her body. She had eye exam in 6/23. She reports hx of Shingles when she was in 7th grade and in college.          Review of Systems:     Review Of Systems  Constitutional: denies fever, chills, night sweats and weight loss.  Skin: no skin rash.  Eyes: No dryness or irritation in eyes. No episode of eye inflammation or redness.   Ears/Nose/Throat: no recurrent sinus infections.  Respiratory: No shortness of breath, dyspnea on exertion, cough, or hemoptysis  Cardiovascular: No chest pain or palpitations.  Gastrointestinal: no nausea, vomiting, abdominal pain.  Normal bowel movements.  Genitourinary: no dysuria, frequency  or hematuria.  Musculoskeletal: as in HPI  Neurologic: no numbness, tingling.  Psychiatric: no mood disorders.  Hematologic/Lymphatic/Immunologic: no history of easy bruising, petechia or purpura.  No abnormal bleeding.   Endocrine: no h/o thyroid disease or Diabetes.                  Past Medical History:     Past Medical History:   Diagnosis Date    Chest pain on breathing     Fatigue     Multiple joint pain     Photosensitivity      Past Surgical History:   Procedure Laterality Date    ABDOMEN SURGERY  8/10/12, 9/28/13, 2/13/15,9/12/16,9/2019    Four C-sections, tummy tuck    BREAST SURGERY  9/2019    Breast reduction    HERNIA REPAIR  01/2021    Umbilical hernia    IR CHEST PORT PLACEMENT > 5 YRS OF AGE  03/31/2023    NO HISTORY OF SURGERY      VASCULAR SURGERY  03/2023    Port placement            Social History:     Social History     Occupational History    Not on file   Tobacco Use    Smoking status: Never    Smokeless tobacco: Never   Vaping Use    Vaping Use: Never used   Substance and Sexual Activity    Alcohol use: Never    Drug  "use: Never    Sexual activity: Yes     Partners: Male     Birth control/protection: Male Surgical            Family History:     Family History   Problem Relation Age of Onset    Lupus Maternal Uncle     Sarcoidosis Maternal Aunt     Hypertension Father     Diabetes Other             Allergies:     Allergies   Allergen Reactions    Other Environmental Allergy Swelling     Cat & long haired dog dander - eyes swell, hives, airway gets tight - has never needed Epi Pen            Medications:     Current Outpatient Medications   Medication Sig Dispense Refill    Belimumab (BENLYSTA IV)       diphenhydrAMINE (BENADRYL) 25 MG tablet Take 2-4 tablets by mouth As needed for seizure      hydroxychloroquine (PLAQUENIL) 200 MG tablet Take 1 tablet (200 mg) by mouth 2 times daily 180 tablet 1    magic mouthwash (ENTER INGREDIENTS IN COMMENTS) suspension Guafenesin - 70cc  2% viscous lidocaine - 30cc  Diphenhydramine (12.5/5cc) - 200cc  Nystatin - 30 cc  Sucralfate - 100cc  Maalox - 50cc  Disp: 480 cc(16 oz)  Sig: Use 3 tsp.(15cc), t.i.d. Swish for 3 mins, gargle and expectorate for 2 weeks. Then use prn for maintenance. 480 mL 1    Multiple Vitamin (MULTIVITAMINS PO)       pneumococcal (PREVNAR 20) 0.5 ML AXEL injection Inject 0.5 mLs into the muscle once for 1 dose 0.5 mL 0    zoster vaccine recombinant adjuvanted (SHINGRIX) injection Inject 0.5 mLs into the muscle once for 1 dose 0.5 mL 0    Semaglutide-Weight Management (WEGOVY) 0.25 MG/0.5ML pen Inject 0.25 mg Subcutaneous once a week For 4 weeks (Patient not taking: Reported on 1/26/2024) 2 mL 0    Semaglutide-Weight Management (WEGOVY) 0.5 MG/0.5ML pen Inject 0.5 mg Subcutaneous once a week (Patient not taking: Reported on 1/26/2024) 2 mL 1    Semaglutide-Weight Management (WEGOVY) 1 MG/0.5ML pen Inject 1 mg Subcutaneous once a week (Patient not taking: Reported on 1/26/2024) 2 mL 1            Physical Exam:   Blood pressure 108/74, pulse 85, height 1.702 m (5' 7\"), " weight 94.3 kg (208 lb), SpO2 98%.  Wt Readings from Last 4 Encounters:   01/26/24 94.3 kg (208 lb)   01/08/24 94.7 kg (208 lb 11.2 oz)   01/08/24 94.7 kg (208 lb 11.2 oz)   12/01/23 95.1 kg (209 lb 9.6 oz)       Constitutional: Moderately built, appears stated age, cooperative    Musculoskeletal: No synovitis, tenderness present over MCP, PIP, DIP joints, bilateral wrists, elbows.  Normal range of motion of shoulders.  No synovitis and tenderness over ankles, MTP joints.  No knee effusions.  Normal muscle strength.      Skin: No active raynaud's, malar rash noted         Data:     No results found for any visits on 01/26/24.      Hemoglobin   Date Value Ref Range Status   01/11/2024 13.4 11.7 - 15.7 g/dL Final   09/25/2023 13.2 11.7 - 15.7 g/dL Final   08/03/2023 13.6 11.7 - 15.7 g/dL Final     Urea Nitrogen   Date Value Ref Range Status   07/06/2022 12 7 - 30 mg/dL Final   11/24/2021 13 7 - 30 mg/dL Final     Erythrocyte Sedimentation Rate   Date Value Ref Range Status   01/11/2024 16 0 - 20 mm/hr Final   08/03/2023 21 (H) 0 - 20 mm/hr Final   03/24/2023 18 0 - 20 mm/hr Final     CRP Inflammation   Date Value Ref Range Status   11/24/2021 3.1 0.0 - 8.0 mg/L Final     AST   Date Value Ref Range Status   01/11/2024 40 0 - 45 U/L Final     Comment:     Reference intervals for this test were updated on 6/12/2023 to more accurately reflect our healthy population. There may be differences in the flagging of prior results with similar values performed with this method. Interpretation of those prior results can be made in the context of the updated reference intervals.   09/25/2023 46 (H) 0 - 45 U/L Final     Comment:     Reference intervals for this test were updated on 6/12/2023 to more accurately reflect our healthy population. There may be differences in the flagging of prior results with similar values performed with this method. Interpretation of those prior results can be made in the context of the updated  reference intervals.   08/03/2023 61 (H) 0 - 45 U/L Final     Comment:     Reference intervals for this test were updated on 6/12/2023 to more accurately reflect our healthy population. There may be differences in the flagging of prior results with similar values performed with this method. Interpretation of those prior results can be made in the context of the updated reference intervals.     Albumin   Date Value Ref Range Status   01/11/2024 4.2 3.5 - 5.2 g/dL Final   09/25/2023 4.2 3.5 - 5.2 g/dL Final   08/03/2023 4.3 3.5 - 5.2 g/dL Final   02/10/2023 3.8 3.4 - 5.0 g/dL Final   09/08/2022 4.1 3.4 - 5.0 g/dL Final   07/06/2022 3.8 3.4 - 5.0 g/dL Final     Alkaline Phosphatase   Date Value Ref Range Status   01/11/2024 115 40 - 150 U/L Final     Comment:     Reference intervals for this test were updated on 11/14/2023 to more accurately reflect our healthy population. There may be differences in the flagging of prior results with similar values performed with this method. Interpretation of those prior results can be made in the context of the updated reference intervals.   09/25/2023 111 (H) 35 - 104 U/L Final   04/04/2023 107 (H) 35 - 104 U/L Final     ALT   Date Value Ref Range Status   01/11/2024 77 (H) 0 - 50 U/L Final     Comment:     Reference intervals for this test were updated on 6/12/2023 to more accurately reflect our healthy population. There may be differences in the flagging of prior results with similar values performed with this method. Interpretation of those prior results can be made in the context of the updated reference intervals.     09/25/2023 91 (H) 0 - 50 U/L Final     Comment:     Reference intervals for this test were updated on 6/12/2023 to more accurately reflect our healthy population. There may be differences in the flagging of prior results with similar values performed with this method. Interpretation of those prior results can be made in the context of the updated reference  intervals.     08/03/2023 100 (H) 0 - 50 U/L Final     Comment:     Reference intervals for this test were updated on 6/12/2023 to more accurately reflect our healthy population. There may be differences in the flagging of prior results with similar values performed with this method. Interpretation of those prior results can be made in the context of the updated reference intervals.       Rheumatoid Factor   Date Value Ref Range Status   11/24/2021 <7 <12 IU/mL Final     Recent Labs   Lab Test 01/11/24  1203 09/25/23  1337 08/03/23  1035 04/04/23  1645 09/08/22  1111 07/06/22  1153 11/24/21  1239   WBC 7.5 8.8 6.6  --    < > 6.2 7.1   HGB 13.4 13.2 13.6  --    < > 13.6 14.1   HCT 40.5 39.2 40.9  --    < > 40.4 42.3   MCV 88 86 86  --    < > 85 87    358 363  --    < > 314 395   BUN  --   --   --   --   --  12 13   TSH  --   --  0.87  --   --   --   --    AST 40 46* 61*  --    < > 10 18   ALT 77* 91* 100*  --    < > 22 33   ALKPHOS 115 111*  --  107*   < > 89 106    < > = values in this interval not displayed.        Outside studies reviewed: Results from [facility] reviewed     Reviewed Rheumatology lab flowsheet    Felicita Herrera MD  [facility]  Department of Rheumatology & Autoimmune Disorders  [employer] [place]: [phone]   Pager - [phone]

## 2024-01-26 NOTE — PATIENT INSTRUCTIONS
Will continue Benlysta infusion monthly     Continue Plaquenil 400 mg daily     Recommend getting Shingrix and Prevnar 20 vaccine.     Follow up in 6- 8 months with labs in [place].

## 2024-02-22 ENCOUNTER — VIRTUAL VISIT (OUTPATIENT)
Dept: CARDIOLOGY | Facility: CLINIC | Age: 39
End: 2024-02-22
Attending: PHYSICIAN ASSISTANT
Payer: COMMERCIAL

## 2024-02-22 DIAGNOSIS — R14.0 ABDOMINAL BLOATING: Primary | ICD-10-CM

## 2024-02-22 DIAGNOSIS — K76.0 HEPATIC STEATOSIS: ICD-10-CM

## 2024-02-22 DIAGNOSIS — M32.9 SYSTEMIC LUPUS ERYTHEMATOSUS, UNSPECIFIED SLE TYPE, UNSPECIFIED ORGAN INVOLVEMENT STATUS (H): ICD-10-CM

## 2024-02-22 DIAGNOSIS — E66.09 CLASS 1 OBESITY DUE TO EXCESS CALORIES WITH SERIOUS COMORBIDITY AND BODY MASS INDEX (BMI) OF 32.0 TO 32.9 IN ADULT: ICD-10-CM

## 2024-02-22 DIAGNOSIS — Z78.9 TAKES DIETARY SUPPLEMENTS: ICD-10-CM

## 2024-02-22 DIAGNOSIS — E66.811 CLASS 1 OBESITY DUE TO EXCESS CALORIES WITH SERIOUS COMORBIDITY AND BODY MASS INDEX (BMI) OF 32.0 TO 32.9 IN ADULT: ICD-10-CM

## 2024-02-22 RX ORDER — FUROSEMIDE 20 MG
20 TABLET ORAL DAILY
COMMUNITY

## 2024-02-22 ASSESSMENT — PAIN SCALES - GENERAL: PAINLEVEL: NO PAIN (0)

## 2024-02-22 NOTE — PROGRESS NOTES
Medication Therapy Management (MTM) Encounter    ASSESSMENT:                            Medication Adherence/Access: No issues identified    Weight Management:   Would not advise use of weight loss medication(s), including GLP1 agonists at this time. Although patient reports would consider paying out of pocket for GLP1 agonist, would not advise given severity of abdominal bloating that is mentioned until further work up ensures. She dose not qualify for other weight loss medication(s) options due to contraindications or less safe options.     Abdominal Bloating/Hepatic Steatosis:   Due to lack of effective response from furosemide, unclear etiology of abdominal bloating. Do wonder if work up with Gastroenterology would be worth exploring more. If patient has diagnosis of Celiacs and has been gluten free then present symptom would not be related to this. Seems reasonable to trial omeprazole 20 mg daily for 1 month to determine if improvement in symptoms as no interaction with current medications. Will ask provider about referral to General Gastroenterology.     Lupus:   Stable, defer to Rheumatology. Unclear if symptoms described above are related to Lupus as other features appear to be stable as of now. Will [place] back with MTM pharmacist regarding if still ongoing Benlysta shortage?, last message from MTM said now available but do not see that she had infusion since 21/1/2023.    Immunizations? Did not discuss today but per [facility] either do not have all adequate records or she may due on multiple immunizations - COVID-19, Pneumococcal, shingrix, influenza, Tdap, etc.     Supplements:   Stable.     PLAN:                            Trial omeprazole 20 mg daily taking 30-60 minutes before first meal, trial for 4 weeks to see if improves abdominal bloating. Approved by Kiara Moreno, PAC  Will see provider thoughts about placing General Gastroenterology referral.  Lauren Bloch MTM in Weight Management will reach out to Juan  "P MTM in Rheumatology regarding Benlysta infusion status    Follow-up: No follow-ups on file.    SUBJECTIVE/OBJECTIVE:                          Enid Nguyen is a 39 year old female called for follow up visit. She was referred to me from ALCIRA Betancourt.  Last seen by MTM in Rheumatology, Juan Chavez MTM pharmacist.     Reason for visit: Potential weight loss medication(s) start.    Allergies/ADRs: Reviewed in chart  Past Medical History: Reviewed in chart  Tobacco: She reports that she has never smoked. She has never used smokeless tobacco.  Alcohol: not currently using    Medication Adherence/Access: no issues reported    Weight Management:   No medications     Followed by Kiara Moreno PA-C, seen for [facility]. She reports she was prescribed Zepbound and Wegovy but found both per not covered by insurance, weight loss medication(s) product exclusion with insurance.   Diet/Eating Habits: Patient reports she still does try to get in enough to eat, but due to uncomfortableness in abdomen, doesn't want to eat. What she gets in on a day to day varies.   Exercise/Activity: Patient reports HIIT three times weekly, has been able to stay consistent with this due to Lupus under control.   Tried/Failed/Contraindicated Medications: Phentermine: history of palpitations so not advisable if ongoing. Topiramate: history of lupus flare induced seizures and kidney stones, therefore not advisable.      Current weight today: 0 lbs 0 oz    Wt Readings from Last 4 Encounters:   01/26/24 94.3 kg (208 lb)   01/08/24 94.7 kg (208 lb 11.2 oz)   01/08/24 94.7 kg (208 lb 11.2 oz)   12/01/23 95.1 kg (209 lb 9.6 oz)     Estimated body mass index is 32.58 kg/m  as calculated from the following:    Height as of 1/26/24: 1.702 m (5' 7\").    Weight as of 1/26/24: 94.3 kg (208 lb).    Abdominal Bloating/Hepatic Steatosis:   Furosemide 20 mg daily in AM for reported water retention, started 2 weeks ago    Has been dealing with " "abdominal bloating for years, prior to starting Benlysta, which is what prompted Celiac testing with her previous provider. Describes after every meal, even small meals, or large amounts of fluid, she is having abdominal bloating. She feels she could \"pop\" her belly at times. Describes that furosemide hasn't helped with this bloating sensation, furosemide was more recently started by primary care provider as thought some of presentation may be fluid retention. She describes no difference in bloating after meals. Reports Celiac Disease, diagnosed from borderline positive antibody testing in 2021. Reports when she went to gluten free 2 years ago , she felt better. She reports violently sick, gastrointestinal issues and vomiting if she does eat or touch gluten. Reports hasn't discussed bloating further before with providers and so many other aspects going on with her diagnosis.   Was Seen by Hepatology, 9/25/2023. There was discussion about further work up due to persistent elevated liver enzymes not found to be related to medications.    Lupus:   Benlysta 950 mg infusion monthly - medication shortage?  Hydroxychloroquine 200 mg twice daily     Benlysta had been on shortage. Per last Rheumatology MTM note 12/21/2023, Benlysta no longer on shortage. Last infusion 12/1/2023. Feels lupus has been under control that she has been able to be physically active. Benlysta started 12/2022. Reports that when she is in a flare will have seizures. She will feel \"fuzziness\" before seizure starts. As soon as she feels this will take  mg diphenhydramine. Hasn't had seizure since [place]. Typically will have a couple seizures a month, but has currently been the longest time without having seizure. Will at times get sores during lupus flare, magic mouthwash helpful. Hasn't had sores since being on Benlysta. Reports joint pain ongoing, but largely improve with Benlysta. No facial rash present for months. Reports ongoing " nausea randomly longstanding since diagnosis of Lupus, even prior to Benlysta. Followed with Rheumatology and Neurology.  Previously was on methotrexate but due to elevated liver enzymes, stopped. History Shingles in 7th grade and in college.     Supplements:   Multivitamin daily     No concerns.     Today's Vitals: There were no vitals taken for this visit.  ----------------  I spent 40 minutes with this patient today. All changes were made via collaborative practice agreement with ALCIRA Betancourt. A copy of the visit note was provided to the patient's provider(s).    A summary of these recommendations was sent via [employer].    Lauren Bloch, PharmD, BCACP   Medication Therapy Management Pharmacist   [facility]    Telemedicine Visit Details  Type of service:  Telephone visit  Start Time:  3:10 PM  End Time:  3:50 PM     Medication Therapy Recommendations  Abdominal bloating    Rationale: Untreated condition - Needs additional medication therapy - Indication   Recommendation: Start Medication - omeprazole 20 MG DR capsule   Status: Accepted with Changes per Provider

## 2024-02-22 NOTE — LETTER
2/22/2024      RE: Dasia Nguyen  [address]  [place] [address]       Dear Colleague,    Thank you for the opportunity to participate in the care of your patient, Dasia Nguyen, at the [facility] [place] [facility]. Please see a copy of my visit note below.    Medication Therapy Management (MTM) Encounter    ASSESSMENT:                            Medication Adherence/Access: No issues identified    Weight Management:   Would not advise use of weight loss medication(s), including GLP1 agonists at this time. Although patient reports would consider paying out of pocket for GLP1 agonist, would not advise given severity of abdominal bloating that is mentioned until further work up ensures. She dose not qualify for other weight loss medication(s) options due to contraindications or less safe options.     Abdominal Bloating/Hepatic Steatosis:   Due to lack of effective response from furosemide, unclear etiology of abdominal bloating. Do wonder if work up with Gastroenterology would be worth exploring more. If patient has diagnosis of Celiacs and has been gluten free then present symptom would not be related to this. Seems reasonable to trial omeprazole 20 mg daily for 1 month to determine if improvement in symptoms as no interaction with current medications. Will ask provider about referral to General Gastroenterology.     Lupus:   Stable, defer to Rheumatology. Unclear if symptoms described above are related to Lupus as other features appear to be stable as of now. Will [place] back with [facility] pharmacist regarding if still ongoing Benlysta shortage?, last message from MT said now available but do not see that she had infusion since 21/1/2023.    Immunizations? Did not discuss today but per [facility] either do not have all adequate records or she may due on multiple immunizations - COVID-19, Pneumococcal, shingrix, influenza, Tdap, etc.      Supplements:   Stable.     PLAN:                            Trial omeprazole 20 mg daily taking 30-60 minutes before first meal, trial for 4 weeks to see if improves abdominal bloating. Approved by ALCIRA Betancourt  Will see provider thoughts about placing General Gastroenterology referral.  Lauren Bloch MTM in Weight Management will reach out to Juan JENNINGS MTM in Rheumatology regarding Benlysta infusion status    Follow-up: No follow-ups on file.    SUBJECTIVE/OBJECTIVE:                          Enid Nguyen is a 39 year old female called for follow up visit. She was referred to me from ALCIRA Betancourt.  Last seen by MTM in Rheumatology, Juan Chavez MTM pharmacist.     Reason for visit: Potential weight loss medication(s) start.    Allergies/ADRs: Reviewed in chart  Past Medical History: Reviewed in chart  Tobacco: She reports that she has never smoked. She has never used smokeless tobacco.  Alcohol: not currently using    Medication Adherence/Access: no issues reported    Weight Management:   No medications     Followed by Kiara Moreno PA-C, seen for [facility]. She reports she was prescribed Zepbound and Wegovy but found both per not covered by insurance, weight loss medication(s) product exclusion with insurance.   Diet/Eating Habits: Patient reports she still does try to get in enough to eat, but due to uncomfortableness in abdomen, doesn't want to eat. What she gets in on a day to day varies.   Exercise/Activity: Patient reports HIIT three times weekly, has been able to stay consistent with this due to Lupus under control.   Tried/Failed/Contraindicated Medications: Phentermine: history of palpitations so not advisable if ongoing. Topiramate: history of lupus flare induced seizures and kidney stones, therefore not advisable.      Current weight today: 0 lbs 0 oz    Wt Readings from Last 4 Encounters:   01/26/24 94.3 kg (208 lb)   01/08/24 94.7 kg (208 lb 11.2 oz)   01/08/24 94.7 kg (208 lb 11.2  "oz)   12/01/23 95.1 kg (209 lb 9.6 oz)     Estimated body mass index is 32.58 kg/m  as calculated from the following:    Height as of 1/26/24: 1.702 m (5' 7\").    Weight as of 1/26/24: 94.3 kg (208 lb).    Abdominal Bloating/Hepatic Steatosis:   Furosemide 20 mg daily in AM for reported water retention, started 2 weeks ago    Has been dealing with abdominal bloating for years, prior to starting Benlysta, which is what prompted Celiac testing with her previous provider. Describes after every meal, even small meals, or large amounts of fluid, she is having abdominal bloating. She feels she could \"pop\" her belly at times. Describes that furosemide hasn't helped with this bloating sensation, furosemide was more recently started by primary care provider as thought some of presentation may be fluid retention. She describes no difference in bloating after meals. Reports Celiac Disease, diagnosed from borderline positive antibody testing in 2021. Reports when she went to gluten free 2 years ago , she felt better. She reports violently sick, gastrointestinal issues and vomiting if she does eat or touch gluten. Reports hasn't discussed bloating further before with providers and so many other aspects going on with her diagnosis.   Was Seen by Hepatology, 9/25/2023. There was discussion about further work up due to persistent elevated liver enzymes not found to be related to medications.    Lupus:   Benlysta 950 mg infusion monthly - medication shortage?  Hydroxychloroquine 200 mg twice daily     Benlysta had been on shortage. Per last Rheumatology MTM note 12/21/2023, Benlysta no longer on shortage. Last infusion 12/1/2023. Feels lupus has been under control that she has been able to be physically active. Benlysta started 12/2022. Reports that when she is in a flare will have seizures. She will feel \"fuzziness\" before seizure starts. As soon as she feels this will take  mg diphenhydramine. Hasn't had seizure since " [place]. Typically will have a couple seizures a month, but has currently been the longest time without having seizure. Will at times get sores during lupus flare, magic mouthwash helpful. Hasn't had sores since being on Benlysta. Reports joint pain ongoing, but largely improve with Benlysta. No facial rash present for months. Reports ongoing nausea randomly longstanding since diagnosis of Lupus, even prior to Benlysta. Followed with Rheumatology and Neurology.  Previously was on methotrexate but due to elevated liver enzymes, stopped. History Shingles in 7th grade and in college.     Supplements:   Multivitamin daily     No concerns.     Today's Vitals: There were no vitals taken for this visit.  ----------------  I spent 40 minutes with this patient today. All changes were made via collaborative practice agreement with ALCIRA Betancourt. A copy of the visit note was provided to the patient's provider(s).    A summary of these recommendations was sent via [employer].     Medication Therapy Recommendations  Abdominal bloating    Rationale: Untreated condition - Needs additional medication therapy - Indication   Recommendation: Start Medication - omeprazole 20 MG DR capsule   Status: Accepted with Changes per Provider                Please do not hesitate to contact me if you have any questions/concerns.     Sincerely,     Lauren T. Bloch, JAYDEN

## 2024-02-22 NOTE — Clinical Note
Also! Sylvia Arrietalysta was on shortage, doesn't appear to be now. Your last message 12/21 said it was no longer on shortage but dont see she had her infusion since 12/1/23. Do you know what the hold up is? Rheum provider's last note is to continue Benlysta?. Maybe I am not seeing all of the infusion orders?

## 2024-02-22 NOTE — Clinical Note
Michaela Martinez,  I had a meeting with Enid. I wanted to see if she had talked to you much ever about abdominal bloating she has been experiencing for an extended period of time. I didn't see anything in your notes but thought id ask. Its curious, she describes these feels for a long time but due to other health factors never brought it up. I am talking to our Weight Management provider and see if worth gastroenterology referral. She saw Hepatology in September but did not vibe well with them. Previous patient reported history of Celiacs, not extensive testing but GF for a while and still issues. Curious your thoughts... We thought a PPI would be worth it to try as its all abdominal bloating related to meal. Was also thinking is this a gastric emptying thing but no greater nausea/vomiting around time of meal.. Curious.

## 2024-02-22 NOTE — NURSING NOTE
Is the patient currently in the state of [place]? YES    Visit mode:TELEPHONE    If the visit is dropped, the patient can be reconnected by: TELEPHONE VISIT: Phone number:   Telephone Information:   Mobile [phone]       Will anyone else be joining the visit? NO  (If patient encounters technical issues they should call [phone] :[phone])    How would you like to obtain your AVS? MyChart    Are changes needed to the allergy or medication list? Yes pt is no longer taking Wegovy    Reason for visit: RECHECK and Consult (MTM- 1/22 [facility] )    Amaya Holliday VVF    Pt is no longer on Wegovy- due to insurance not covering.    PT has chronic pain- so not experiencing any other pain that is out of the ordinary.

## 2024-02-23 DIAGNOSIS — E66.09 CLASS 1 OBESITY DUE TO EXCESS CALORIES WITH SERIOUS COMORBIDITY AND BODY MASS INDEX (BMI) OF 32.0 TO 32.9 IN ADULT: Primary | ICD-10-CM

## 2024-02-23 DIAGNOSIS — R14.0 ABDOMINAL BLOATING: ICD-10-CM

## 2024-02-23 DIAGNOSIS — E66.811 CLASS 1 OBESITY DUE TO EXCESS CALORIES WITH SERIOUS COMORBIDITY AND BODY MASS INDEX (BMI) OF 32.0 TO 32.9 IN ADULT: Primary | ICD-10-CM

## 2024-02-25 ENCOUNTER — HEALTH MAINTENANCE LETTER (OUTPATIENT)
Age: 39
End: 2024-02-25

## 2024-03-06 ENCOUNTER — HOSPITAL ENCOUNTER (OUTPATIENT)
Facility: CLINIC | Age: 39
End: 2024-03-06
Payer: COMMERCIAL

## 2024-03-06 ENCOUNTER — OFFICE VISIT (OUTPATIENT)
Dept: GASTROENTEROLOGY | Facility: CLINIC | Age: 39
End: 2024-03-06
Attending: PHYSICIAN ASSISTANT
Payer: COMMERCIAL

## 2024-03-06 VITALS
SYSTOLIC BLOOD PRESSURE: 101 MMHG | OXYGEN SATURATION: 100 % | HEIGHT: 67 IN | DIASTOLIC BLOOD PRESSURE: 75 MMHG | BODY MASS INDEX: 32.47 KG/M2 | WEIGHT: 206.9 LBS | HEART RATE: 77 BPM

## 2024-03-06 DIAGNOSIS — E66.811 CLASS 1 OBESITY DUE TO EXCESS CALORIES WITH SERIOUS COMORBIDITY AND BODY MASS INDEX (BMI) OF 32.0 TO 32.9 IN ADULT: ICD-10-CM

## 2024-03-06 DIAGNOSIS — E66.09 CLASS 1 OBESITY DUE TO EXCESS CALORIES WITH SERIOUS COMORBIDITY AND BODY MASS INDEX (BMI) OF 32.0 TO 32.9 IN ADULT: ICD-10-CM

## 2024-03-06 DIAGNOSIS — R14.0 ABDOMINAL BLOATING: ICD-10-CM

## 2024-03-06 PROCEDURE — 99214 OFFICE O/P EST MOD 30 MIN: CPT | Performed by: PHYSICIAN ASSISTANT

## 2024-03-06 ASSESSMENT — PAIN SCALES - GENERAL: PAINLEVEL: MODERATE PAIN (4)

## 2024-03-06 NOTE — LETTER
3/6/2024         RE: Dasia Nguyen  [address]  [facility] [address]        Dear Colleague,    Thank you for referring your patient, Dasia Nguyen, to the [facility]. Please see a copy of my visit note below.    NEW PATIENT GI CLINIC VISIT    CC/REFERRING MD:  Kiara Moreno  REASON FOR CONSULTATION:   Kiara Moreno for   Chief Complaint   Patient presents with     New Patient     New consult for abdominal bloating, abdominal pain after meals, dysphagia, nausea and occasional constipation.       ASSESSMENT/PLAN: Patient here for evaluation regarding symptoms of abdominal bloating and distention postprandially, early satiety, abdominal pain.  We spent some time reviewing her symptoms.  Previous abdominal imaging in the last couple of years has not shown an obvious source for her symptoms.  I did recommend further investigation with EGD to assess for reflux esophagitis, gastritis/peptic ulcer disease, gastric outlet obstruction, as well as gastric emptying scan to rule out gastroparesis and MR enterography to assess for evidence of partial small bowel obstruction, given her previous surgeries.  For now, I encouraged her to avoid problematic foods, switch to low fiber/low particle diet, use protein shakes as needed to maintain nutrition, and may discontinue antacid therapy, as it has seemingly not been helpful for her symptoms.  Will follow-up with her after testing is completed.    1. Class 1 obesity due to excess calories with serious comorbidity and body mass index (BMI) of 32.0 to 32.9 in adult  - Adult GI  Referral - Consult Only    2. Abdominal bloating  - Adult GI  Referral - Consult Only  - MR Enterography wo and w Contrast; Future  - NM Gastric Emptying; Future  - Adult GI  Referral - Procedure Only; Future        RTC 2 months    Thank you for this consultation.  It was a pleasure to participate in the care of this patient; please contact us  with any further questions.      30 minutes spent on the date of the encounter doing chart review, patient visit, and documentation    This note was created with voice recognition software, and while reviewed for accuracy, typos may remain.     Mario Gamez PA-C  Division of Gastroenterology, Hepatology and Nutrition  [facility]  Dasia Nguyen is a 39 year old female with a past medical history significant for systemic lupus erythematosus following with rheumatology that is seen as a new patient in the GI clinic today for symptoms of abdominal bloating and pain.  To review, patient describes having ongoing issues with abdominal bloating and pain following meals.  Even if she just drinks some water or liquids, she gets a feeling of abdominal distention and bloating in the upper abdomen.  There has been associated nausea without vomiting.  She occasionally gets a globus sensation at the back of the throat, feeling like something might be stuck there and she has to swallow multiple times to get it to pass.  Is not getting a sense of esophageal dysphagia.  No heartburn, reflux, but is endorsing early satiety and ultimately avoids eating due to the symptoms.  She is gluten-free, started this a couple of years ago after having equivocal deamidated gliadin IgG, but never had follow-up EGD.  This did improve her symptoms of bloating to some degree.  She has generally pretty normal stool output, occasionally has challenges with constipation.  Past surgical history pertinent for  x 4, umbilical hernia repair, and tummy tuck.  No pertinent family medical history of digestive diseases.  Has trialed antacids recently with no obvious effect on symptoms.  Currently working with weight loss clinic, there has been discussion of using GLP-1 agonist but not covered by insurance.  Currently, she has basically just eating 1 meal per day due to her symptoms.    ROS:    10  point ROS neg other than the symptoms noted above in the HPI.    PREVIOUS ENDOSCOPY:  None    PERTINENT RELEVANT IMAGING OR LABS:  Reviewed    ALLERGIES:     Allergies   Allergen Reactions     Other Environmental Allergy Swelling     Cat & long haired dog dander - eyes swell, hives, airway gets tight - has never needed Epi Pen       PERTINENT MEDICATIONS:    Current Outpatient Medications:      Belimumab (BENLYSTA IV), , Disp: , Rfl:      diphenhydrAMINE (BENADRYL) 25 MG tablet, Take 2-4 tablets by mouth every 6 hours as needed, Disp: , Rfl:      furosemide (LASIX) 20 MG tablet, Take 20 mg by mouth daily, Disp: , Rfl:      hydroxychloroquine (PLAQUENIL) 200 MG tablet, Take 1 tablet (200 mg) by mouth 2 times daily, Disp: 180 tablet, Rfl: 1     magic mouthwash (ENTER INGREDIENTS IN COMMENTS) suspension, Guafenesin - 70cc 2% viscous lidocaine - 30cc Diphenhydramine (12.5/5cc) - 200cc Nystatin - 30 cc Sucralfate - 100cc Maalox - 50cc Disp: 480 cc(16 oz) Sig: Use 3 tsp.(15cc), t.i.d. Swish for 3 mins, gargle and expectorate for 2 weeks. Then use prn for maintenance., Disp: 480 mL, Rfl: 1     Multiple Vitamin (MULTIVITAMINS PO), Take 1 tablet by mouth daily, Disp: , Rfl:      omeprazole (PRILOSEC) 20 MG DR capsule, Take 1 capsule (20 mg) by mouth daily, Disp: 30 capsule, Rfl: 0    PROBLEM LIST  Patient Active Problem List    Diagnosis Date Noted     Class 1 obesity due to excess calories with serious comorbidity and body mass index (BMI) of 32.0 to 32.9 in adult 01/08/2024     Priority: Medium     Hepatic steatosis 01/08/2024     Priority: Medium     Systemic lupus erythematosus (H) 08/06/2022     Priority: Medium     Chest pain on breathing 11/24/2021     Priority: Medium     Fatigue 11/24/2021     Priority: Medium     Multiple joint pain 11/24/2021     Priority: Medium     Photosensitivity 11/24/2021     Priority: Medium       PERTINENT PAST MEDICAL HISTORY:  Past Medical History:   Diagnosis Date     Chest pain on  "breathing      Fatigue      Multiple joint pain      Photosensitivity        PREVIOUS SURGERIES:  Past Surgical History:   Procedure Laterality Date     ABDOMEN SURGERY  8/10/12, 9/28/13, 2/13/15,9/12/16,9/2019    Four C-sections, tummy tuck     BREAST SURGERY  9/2019    Breast reduction     diastasis recti       HERNIA REPAIR  01/2021    Umbilical hernia     IR CHEST PORT PLACEMENT > 5 YRS OF AGE  03/31/2023     NO HISTORY OF SURGERY       VASCULAR SURGERY  03/2023    Port placement       SOCIAL HISTORY:  Social History     Socioeconomic History     Marital status:      Spouse name: Not on file     Number of children: Not on file     Years of education: Not on file     Highest education level: Not on file   Occupational History     Not on file   Tobacco Use     Smoking status: Never     Smokeless tobacco: Never   Vaping Use     Vaping Use: Never used   Substance and Sexual Activity     Alcohol use: Never     Drug use: Never     Sexual activity: Yes     Partners: Male     Birth control/protection: Male Surgical   Other Topics Concern     Not on file   Social History Narrative     Not on file     Social Determinants of Health     Financial Resource Strain: Not on file   Food Insecurity: Not on file   Transportation Needs: Not on file   Physical Activity: Not on file   Stress: Not on file   Social Connections: Not on file   Interpersonal Safety: Not on file   Housing Stability: Not on file       FAMILY HISTORY:  Family History   Problem Relation Age of Onset     Lupus Maternal Uncle      Sarcoidosis Maternal Aunt      Hypertension Father      Diabetes Other        Past/family/social history reviewed and no changes    PHYSICAL EXAMINATION:  Constitutional: aaox3, cooperative, pleasant, not dyspneic/diaphoretic, no acute distress  Vitals reviewed: /75 (BP Location: Left arm, Patient Position: Sitting, Cuff Size: Adult Large)   Pulse 77   Ht 1.702 m (5' 7\")   Wt 93.8 kg (206 lb 14.4 oz)   SpO2 100%   " BMI 32.41 kg/m    Wt:   Wt Readings from Last 2 Encounters:   03/06/24 93.8 kg (206 lb 14.4 oz)   01/26/24 94.3 kg (208 lb)      Eyes: Sclera anicteric/injected  CV: No edema  Respiratory: Unlabored breathing  Skin: warm, perfused, no jaundice  Psych: Normal affect  MSK: Normal gait                      Again, thank you for allowing me to participate in the care of your patient.        Sincerely,        Mario Gamez PA-C

## 2024-03-06 NOTE — NURSING NOTE
"Chief Complaint   Patient presents with    New Patient     New consult for abdominal bloating, abdominal pain after meals, dysphagia, nausea and occasional constipation.     She requests these members of her care team be copied on today's visit information:  PCP: Kaushik Luna    Referring Provider:  Kiara Moreno PA-C  [address]  [place] [address]    Vitals:    03/06/24 1307   BP: 101/75   BP Location: Left arm   Patient Position: Sitting   Cuff Size: Adult Large   Pulse: 77   SpO2: 100%   Weight: 93.8 kg (206 lb 14.4 oz)   Height: 1.702 m (5' 7\")     Body mass index is 32.41 kg/m .    Medications were reconciled.        Nga Zaidi CMA    "

## 2024-03-06 NOTE — PROGRESS NOTES
NEW PATIENT GI CLINIC VISIT    CC/REFERRING MD:  Kiara Moreno  REASON FOR CONSULTATION:   Kiara Moreno for   Chief Complaint   Patient presents with    New Patient     New consult for abdominal bloating, abdominal pain after meals, dysphagia, nausea and occasional constipation.       ASSESSMENT/PLAN: Patient here for evaluation regarding symptoms of abdominal bloating and distention postprandially, early satiety, abdominal pain.  We spent some time reviewing her symptoms.  Previous abdominal imaging in the last couple of years has not shown an obvious source for her symptoms.  I did recommend further investigation with EGD to assess for reflux esophagitis, gastritis/peptic ulcer disease, gastric outlet obstruction, as well as gastric emptying scan to rule out gastroparesis and MR enterography to assess for evidence of partial small bowel obstruction, given her previous surgeries.  For now, I encouraged her to avoid problematic foods, switch to low fiber/low particle diet, use protein shakes as needed to maintain nutrition, and may discontinue antacid therapy, as it has seemingly not been helpful for her symptoms.  Will follow-up with her after testing is completed.    1. Class 1 obesity due to excess calories with serious comorbidity and body mass index (BMI) of 32.0 to 32.9 in adult  - Adult GI  Referral - Consult Only    2. Abdominal bloating  - Adult GI  Referral - Consult Only  - MR Enterography wo and w Contrast; Future  - NM Gastric Emptying; Future  - Adult GI  Referral - Procedure Only; Future        RTC 2 months    Thank you for this consultation.  It was a pleasure to participate in the care of this patient; please contact us with any further questions.      30 minutes spent on the date of the encounter doing chart review, patient visit, and documentation    This note was created with voice recognition software, and while reviewed for accuracy, typos may remain.     Mario Gamez  [place]  Division of Gastroenterology, Hepatology and Nutrition  [facility]      HPI  Dasia Nguyen is a 39 year old female with a past medical history significant for systemic lupus erythematosus following with rheumatology that is seen as a new patient in the GI clinic today for symptoms of abdominal bloating and pain.  To review, patient describes having ongoing issues with abdominal bloating and pain following meals.  Even if she just drinks some water or liquids, she gets a feeling of abdominal distention and bloating in the upper abdomen.  There has been associated nausea without vomiting.  She occasionally gets a globus sensation at the back of the throat, feeling like something might be stuck there and she has to swallow multiple times to get it to pass.  Is not getting a sense of esophageal dysphagia.  No heartburn, reflux, but is endorsing early satiety and ultimately avoids eating due to the symptoms.  She is gluten-free, started this a couple of years ago after having equivocal deamidated gliadin IgG, but never had follow-up EGD.  This did improve her symptoms of bloating to some degree.  She has generally pretty normal stool output, occasionally has challenges with constipation.  Past surgical history pertinent for  x 4, umbilical hernia repair, and tummy tuck.  No pertinent family medical history of digestive diseases.  Has trialed antacids recently with no obvious effect on symptoms.  Currently working with weight loss clinic, there has been discussion of using GLP-1 agonist but not covered by insurance.  Currently, she has basically just eating 1 meal per day due to her symptoms.    ROS:    10 point ROS neg other than the symptoms noted above in the HPI.    PREVIOUS ENDOSCOPY:  None    PERTINENT RELEVANT IMAGING OR LABS:  Reviewed    ALLERGIES:     Allergies   Allergen Reactions    Other Environmental Allergy Swelling     Cat & long haired dog  dander - eyes swell, hives, airway gets tight - has never needed Epi Pen       PERTINENT MEDICATIONS:    Current Outpatient Medications:     Belimumab (BENLYSTA IV), , Disp: , Rfl:     diphenhydrAMINE (BENADRYL) 25 MG tablet, Take 2-4 tablets by mouth every 6 hours as needed, Disp: , Rfl:     furosemide (LASIX) 20 MG tablet, Take 20 mg by mouth daily, Disp: , Rfl:     hydroxychloroquine (PLAQUENIL) 200 MG tablet, Take 1 tablet (200 mg) by mouth 2 times daily, Disp: 180 tablet, Rfl: 1    magic mouthwash (ENTER INGREDIENTS IN COMMENTS) suspension, Guafenesin - 70cc 2% viscous lidocaine - 30cc Diphenhydramine (12.5/5cc) - 200cc Nystatin - 30 cc Sucralfate - 100cc Maalox - 50cc Disp: 480 cc(16 oz) Sig: Use 3 tsp.(15cc), t.i.d. Swish for 3 mins, gargle and expectorate for 2 weeks. Then use prn for maintenance., Disp: 480 mL, Rfl: 1    Multiple Vitamin (MULTIVITAMINS PO), Take 1 tablet by mouth daily, Disp: , Rfl:     omeprazole (PRILOSEC) 20 MG DR capsule, Take 1 capsule (20 mg) by mouth daily, Disp: 30 capsule, Rfl: 0    PROBLEM LIST  Patient Active Problem List    Diagnosis Date Noted    Class 1 obesity due to excess calories with serious comorbidity and body mass index (BMI) of 32.0 to 32.9 in adult 01/08/2024     Priority: Medium    Hepatic steatosis 01/08/2024     Priority: Medium    Systemic lupus erythematosus (H) 08/06/2022     Priority: Medium    Chest pain on breathing 11/24/2021     Priority: Medium    Fatigue 11/24/2021     Priority: Medium    Multiple joint pain 11/24/2021     Priority: Medium    Photosensitivity 11/24/2021     Priority: Medium       PERTINENT PAST MEDICAL HISTORY:  Past Medical History:   Diagnosis Date    Chest pain on breathing     Fatigue     Multiple joint pain     Photosensitivity        PREVIOUS SURGERIES:  Past Surgical History:   Procedure Laterality Date    ABDOMEN SURGERY  8/10/12, 9/28/13, 2/13/15,9/12/16,9/2019    Four C-sections, tummy tuck    BREAST SURGERY  9/2019    Breast  "reduction    diastasis recti      HERNIA REPAIR  01/2021    Umbilical hernia    IR CHEST PORT PLACEMENT > 5 YRS OF AGE  03/31/2023    NO HISTORY OF SURGERY      VASCULAR SURGERY  03/2023    Port placement       SOCIAL HISTORY:  Social History     Socioeconomic History    Marital status:      Spouse name: Not on file    Number of children: Not on file    Years of education: Not on file    Highest education level: Not on file   Occupational History    Not on file   Tobacco Use    Smoking status: Never    Smokeless tobacco: Never   Vaping Use    Vaping Use: Never used   Substance and Sexual Activity    Alcohol use: Never    Drug use: Never    Sexual activity: Yes     Partners: Male     Birth control/protection: Male Surgical   Other Topics Concern    Not on file   Social History Narrative    Not on file     Social Determinants of Health     Financial Resource Strain: Not on file   Food Insecurity: Not on file   Transportation Needs: Not on file   Physical Activity: Not on file   Stress: Not on file   Social Connections: Not on file   Interpersonal Safety: Not on file   Housing Stability: Not on file       FAMILY HISTORY:  Family History   Problem Relation Age of Onset    Lupus Maternal Uncle     Sarcoidosis Maternal Aunt     Hypertension Father     Diabetes Other        Past/family/social history reviewed and no changes    PHYSICAL EXAMINATION:  Constitutional: aaox3, cooperative, pleasant, not dyspneic/diaphoretic, no acute distress  Vitals reviewed: /75 (BP Location: Left arm, Patient Position: Sitting, Cuff Size: Adult Large)   Pulse 77   Ht 1.702 m (5' 7\")   Wt 93.8 kg (206 lb 14.4 oz)   SpO2 100%   BMI 32.41 kg/m    Wt:   Wt Readings from Last 2 Encounters:   03/06/24 93.8 kg (206 lb 14.4 oz)   01/26/24 94.3 kg (208 lb)      Eyes: Sclera anicteric/injected  CV: No edema  Respiratory: Unlabored breathing  Skin: warm, perfused, no jaundice  Psych: Normal affect  MSK: Normal gait                    "

## 2024-03-07 ENCOUNTER — TELEPHONE (OUTPATIENT)
Dept: MEDSURG UNIT | Facility: CLINIC | Age: 39
End: 2024-03-07
Payer: COMMERCIAL

## 2024-03-12 ENCOUNTER — TELEPHONE (OUTPATIENT)
Dept: GASTROENTEROLOGY | Facility: CLINIC | Age: 39
End: 2024-03-12
Payer: COMMERCIAL

## 2024-03-12 NOTE — TELEPHONE ENCOUNTER
"Endoscopy Scheduling Screen    Have you had a positive Covid test in the last 14 days?  No    What is your communication preference for Instructions and/or Bowel Prep?   MyChart    What insurance is in the chart?  Other:  [employer]    Ordering/Referring Provider:   NUNU MANDEL        (If ordering provider performs procedure, schedule with ordering provider unless otherwise instructed. )    BMI: Estimated body mass index is 32.41 kg/m  as calculated from the following:    Height as of 3/6/24: 1.702 m (5' 7\").    Weight as of 3/6/24: 93.8 kg (206 lb 14.4 oz).     Sedation Ordered  moderate sedation.   If patient BMI > 50 do not schedule in [facility].    If patient BMI > 45 do not schedule at [facility].    Are you taking methadone or Suboxone?  No    Have you had difficulties, pain, or discomfort during past endoscopy procedures?  No    Are you taking any prescription medications for pain 3 or more times per week?   NO, No RN review required.    Do you have a history of malignant hyperthermia?  No    (Females) Are you currently pregnant?   No     Have you been diagnosed or told you have pulmonary hypertension?   No    Do you have an LVAD?  No    Have you been told you have moderate to severe sleep apnea?  No    Have you been told you have COPD, asthma, or any other lung disease?  No    Do you have any heart conditions?  No     Have you ever had or are you waiting for an organ transplant?  No. Continue scheduling, no site restrictions.    Have you had a stroke or transient ischemic attack (TIA aka \"mini stroke\" in the last 6 months?   No    Have you been diagnosed with or been told you have cirrhosis of the liver?   No    Are you currently on dialysis?   No    Do you need assistance transferring?   No    BMI: Estimated body mass index is 32.41 kg/m  as calculated from the following:    Height as of 3/6/24: 1.702 m (5' 7\").    Weight as of 3/6/24: 93.8 kg (206 lb 14.4 oz).     Is patients BMI > 40 and scheduling location " [facility]?  No    Do you take an injectable medication for weight loss or diabetes (excluding insulin)?  No    Do you take the medication Naltrexone?  No    Do you take blood thinners?  No       Prep   Are you currently on dialysis or do you have chronic kidney disease?  No    Do you have a diagnosis of diabetes?  No    Do you have a diagnosis of cystic fibrosis (CF)?  No    On a regular basis do you go 3 -5 days between bowel movements?  No    BMI > 40?  No    Preferred Pharmacy:    [id number] - [place], [place] - [address] [facility] [address]  [address]  [address] [facility] [address]  Phone: [phone] Fax: [phone]    [facility] - [place], [place] - [address]   [address] [facility] [address]  Phone: [phone] Fax: [phone]      Final Scheduling Details     Procedure scheduled  Upper endoscopy (EGD)    Surgeon:  Chaya     Date of procedure:  3/15     Pre-OP / PAC:   No - Not required for this site.    Location  [facility] - [facility] - Patient preference.    Sedation   Moderate Sedation - Per order.      Patient Reminders:   You will receive a call from a Nurse to review instructions and health history.  This assessment must be completed prior to your procedure.  Failure to complete the Nurse assessment may result in the procedure being cancelled.      On the day of your procedure, please designate an adult(s) who can drive you home stay with you for the next 24 hours. The medicines used in the exam will make you sleepy. You will not be able to drive.      You cannot take public transportation, ride share services, or non-medical taxi service without a responsible caregiver.  Medical transport services are allowed with the requirement that a responsible caregiver will receive you at your destination.  We require that drivers and caregivers are confirmed prior to your procedure.

## 2024-03-12 NOTE — TELEPHONE ENCOUNTER
Pre assessment completed for upcoming procedure.      Procedure details:    Patient scheduled for Upper endoscopy (EGD) on 3/15/24.     Arrival time: 1015. Procedure time 1100    Pre op exam needed? N/A    Facility location: [facility]; [address], [place] [address]    Sedation type: Conscious sedation     Indication for procedure:     abdominal bloating, possible celiac disease       Designated  policy reviewed. Instructed to have someone stay 6 hours post procedure.       Chart review:     Electronic implanted devices? No    Recent diagnosis of diverticulitis within the last 6 weeks?  No    Diabetic? No    Diabetic medication HOLDING recommendations: (if applicable)  Oral diabetic medications: N/A  Diabetic injectables: N/A  Insulin: N/A    Medication review:    Anticoagulants? No    NSAIDS? Yes.  Naproxen (Naprosyn, Aleve).  Holding interval of 4 days.  Patient states she has not started medication.      Other medication HOLDING recommendations:  N/A      Prep for procedure:     Bowel prep recommendation: N/A    Prep instructions sent via [facility]     Reviewed procedure prep instructions.     Patient verbalized understanding and had no questions or concerns at this time.        Corinne Kliber, RN  Endoscopy Procedure Pre Assessment RN  [phone] option 4

## 2024-03-15 ENCOUNTER — HOSPITAL ENCOUNTER (OUTPATIENT)
Facility: AMBULATORY SURGERY CENTER | Age: 39
Discharge: HOME | End: 2024-03-15
Attending: INTERNAL MEDICINE | Admitting: INTERNAL MEDICINE
Payer: COMMERCIAL

## 2024-03-15 VITALS
HEART RATE: 76 BPM | RESPIRATION RATE: 16 BRPM | SYSTOLIC BLOOD PRESSURE: 102 MMHG | TEMPERATURE: 97.8 F | OXYGEN SATURATION: 98 % | DIASTOLIC BLOOD PRESSURE: 70 MMHG

## 2024-03-15 LAB — UPPER GI ENDOSCOPY: NORMAL

## 2024-03-15 PROCEDURE — 88305 TISSUE EXAM BY PATHOLOGIST: CPT | Performed by: PATHOLOGY

## 2024-03-15 PROCEDURE — G8918 PT W/O PREOP ORDER IV AB PRO: HCPCS

## 2024-03-15 PROCEDURE — G8907 PT DOC NO EVENTS ON DISCHARG: HCPCS

## 2024-03-15 PROCEDURE — 43239 EGD BIOPSY SINGLE/MULTIPLE: CPT

## 2024-03-15 RX ORDER — FENTANYL CITRATE 50 UG/ML
INJECTION, SOLUTION INTRAMUSCULAR; INTRAVENOUS PRN
Status: DISCONTINUED | OUTPATIENT
Start: 2024-03-15 | End: 2024-03-15 | Stop reason: HOSPADM

## 2024-03-15 RX ORDER — DIPHENHYDRAMINE HYDROCHLORIDE 50 MG/ML
INJECTION INTRAMUSCULAR; INTRAVENOUS PRN
Status: DISCONTINUED | OUTPATIENT
Start: 2024-03-15 | End: 2024-03-15 | Stop reason: HOSPADM

## 2024-03-15 NOTE — H&P
[facility] Anesthesia Pre-op History and Physical    Dasia Nguyen MRN# [id number]   Age: 39 year old YOB: 1985            Date of Exam 3/15/2024       Primary care provider: Kaushik Luna         Chief Complaint and/or Reason for Procedure:     Bloating, post prandial abdominal       Active problem list:     Patient Active Problem List    Diagnosis Date Noted    Class 1 obesity due to excess calories with serious comorbidity and body mass index (BMI) of 32.0 to 32.9 in adult 01/08/2024     Priority: Medium    Hepatic steatosis 01/08/2024     Priority: Medium    Systemic lupus erythematosus (H) 08/06/2022     Priority: Medium    Chest pain on breathing 11/24/2021     Priority: Medium    Fatigue 11/24/2021     Priority: Medium    Multiple joint pain 11/24/2021     Priority: Medium    Photosensitivity 11/24/2021     Priority: Medium            Medications (include herbals and vitamins):   Any Plavix use in the last 7 days? No     Current Outpatient Medications   Medication Sig    diphenhydrAMINE (BENADRYL) 25 MG tablet Take 2-4 tablets by mouth every 6 hours as needed    furosemide (LASIX) 20 MG tablet Take 20 mg by mouth daily    hydroxychloroquine (PLAQUENIL) 200 MG tablet Take 1 tablet (200 mg) by mouth 2 times daily    omeprazole (PRILOSEC) 20 MG DR capsule Take 1 capsule (20 mg) by mouth daily    Belimumab (BENLYSTA IV)     magic mouthwash (ENTER INGREDIENTS IN COMMENTS) suspension Guafenesin - 70cc  2% viscous lidocaine - 30cc  Diphenhydramine (12.5/5cc) - 200cc  Nystatin - 30 cc  Sucralfate - 100cc  Maalox - 50cc  Disp: 480 cc(16 oz)  Sig: Use 3 tsp.(15cc), t.i.d. Swish for 3 mins, gargle and expectorate for 2 weeks. Then use prn for maintenance.    Multiple Vitamin (MULTIVITAMINS PO) Take 1 tablet by mouth daily     No current facility-administered medications for this encounter.             Allergies:      Allergies   Allergen Reactions    Other Environmental Allergy Swelling      Cat & long haired dog dander - eyes swell, hives, airway gets tight - has never needed Epi Pen     Allergy to Latex? No  Allergy to tape?   No  Intolerances:             Physical Exam:   All vitals have been reviewed  Patient Vitals for the past 8 hrs:   BP Temp Temp src Resp SpO2   03/15/24 1037 107/71 97.8  F (36.6  C) Temporal 16 99 %     No intake/output data recorded.  Lungs:   No increased work of breathing, good air exchange, clear to auscultation bilaterally, no crackles or wheezing     Cardiovascular:   normal S1 and S2             Lab / Radiology Results:            Anesthetic risk and/or ASA classification:     2  Luci Larkin DO    od

## 2024-03-19 LAB
PATH REPORT.COMMENTS IMP SPEC: NORMAL
PATH REPORT.COMMENTS IMP SPEC: NORMAL
PATH REPORT.FINAL DX SPEC: NORMAL
PATH REPORT.GROSS SPEC: NORMAL
PATH REPORT.MICROSCOPIC SPEC OTHER STN: NORMAL
PATH REPORT.RELEVANT HX SPEC: NORMAL
PHOTO IMAGE: NORMAL

## 2024-03-20 NOTE — RESULT ENCOUNTER NOTE
Arlyn Rossi,    The report and subsequent biopsies from your recent upper endoscopy are available for you to review.  This test did show some moderate inflammation in the esophagus related to acid reflux.  The biopsies at the base of the esophagus support this.    Otherwise, the stomach and duodenum looked normal and the biopsies from these areas were essentially normal as well.    We typically treat reflux esophagitis with proton pump inhibitors like omeprazole.  I would suggest taking omeprazole for 2 to 3 months and then discontinuing.    I am not sure if these findings account for all of the symptoms that we talked about at your visit.  I would suggest completing the imaging studies that we discussed, the MRI and gastric emptying scan, for further evaluation.    Please let me know if you have any questions.    Sincerely,  Mario DUNLAP [facility] GI, Hepatology, and Nutrition

## 2024-04-20 ENCOUNTER — DOCUMENTATION ONLY (OUTPATIENT)
Dept: PHARMACY | Facility: CLINIC | Age: 39
End: 2024-04-20
Payer: COMMERCIAL

## 2024-04-20 NOTE — PROGRESS NOTES
Skilled Nurse visit in the Patient Home to administer Benlysta 920mg.  No recent elevated temperature, fever, chills, productive cough, coughing for 3 weeks or longer or hemoptysis, abnormal vital signs, night sweats, chest pain. No  decrease in your appetite, unexplained weight loss or fatigue.  No other new onset medical symptoms.  Port-a-Cath right  chest , 1 attempt.  Pre medicated with acetaminophen 650mg PO, diphenhydramine 25mg IVP.  Administered 250ml sodium chloride 0.9% concomitantly with Benlysta.  Infusion completed without complication or reaction. Pt reports therapy is effective in managing symptoms related to therapy.    Fabiola Driver, RN, BSN  [facility]  [phone]  [email or web address]

## 2024-05-06 ENCOUNTER — HOSPITAL ENCOUNTER (OUTPATIENT)
Facility: CLINIC | Age: 39
End: 2024-05-06
Payer: COMMERCIAL

## 2024-05-13 ENCOUNTER — TELEPHONE (OUTPATIENT)
Dept: MEDSURG UNIT | Facility: CLINIC | Age: 39
End: 2024-05-13
Payer: COMMERCIAL

## 2024-05-18 ENCOUNTER — DOCUMENTATION ONLY (OUTPATIENT)
Dept: PHARMACY | Facility: CLINIC | Age: 39
End: 2024-05-18
Payer: COMMERCIAL

## 2024-05-18 NOTE — PROGRESS NOTES
Skilled Nurse visit in the Patient Home to administer Benlysta 920mg.  No recent elevated temperature, fever, chills, productive cough, coughing for 3 weeks or longer or hemoptysis, abnormal vital signs, night sweats, chest pain. No  decrease in your appetite, unexplained weight loss or fatigue.  No other new onset medical symptoms.  Port-a-Cath right  chest , 1 attempt.  Pre medicated with acetaminophen 650mg PO, diphenhydramine 25mg IVP.  Administered 250ml sodium chloride 0.9% concomitantly with Benlysta.  Infusion completed without complication or reaction. Pt reports therapy is effective in managing symptoms related to therapy.     Fabiola Driver, RN, BSN  [facility]  [phone]  [email or web address]

## 2024-06-04 ENCOUNTER — OFFICE VISIT (OUTPATIENT)
Dept: RHEUMATOLOGY | Facility: CLINIC | Age: 39
End: 2024-06-04
Payer: COMMERCIAL

## 2024-06-04 VITALS — OXYGEN SATURATION: 99 % | DIASTOLIC BLOOD PRESSURE: 81 MMHG | SYSTOLIC BLOOD PRESSURE: 120 MMHG | HEART RATE: 104 BPM

## 2024-06-04 DIAGNOSIS — L93.2 CUTANEOUS LUPUS ERYTHEMATOSUS: ICD-10-CM

## 2024-06-04 DIAGNOSIS — R76.8 POSITIVE DOUBLE STRANDED DNA ANTIBODY TEST: ICD-10-CM

## 2024-06-04 DIAGNOSIS — M32.9 SYSTEMIC LUPUS ERYTHEMATOSUS, UNSPECIFIED SLE TYPE, UNSPECIFIED ORGAN INVOLVEMENT STATUS (H): Primary | ICD-10-CM

## 2024-06-04 LAB
ALBUMIN SERPL BCG-MCNC: 4.3 G/DL (ref 3.5–5.2)
ALBUMIN UR-MCNC: NEGATIVE MG/DL
ALP SERPL-CCNC: 106 U/L (ref 40–150)
ALT SERPL W P-5'-P-CCNC: 58 U/L (ref 0–50)
ANION GAP SERPL CALCULATED.3IONS-SCNC: 11 MMOL/L (ref 7–15)
APPEARANCE UR: ABNORMAL
AST SERPL W P-5'-P-CCNC: 35 U/L (ref 0–45)
BACTERIA #/AREA URNS HPF: ABNORMAL /HPF
BASOPHILS # BLD AUTO: 0.1 10E3/UL (ref 0–0.2)
BASOPHILS NFR BLD AUTO: 1 %
BILIRUB SERPL-MCNC: 0.5 MG/DL
BILIRUB UR QL STRIP: NEGATIVE
BUN SERPL-MCNC: 8.6 MG/DL (ref 6–20)
CALCIUM SERPL-MCNC: 9.3 MG/DL (ref 8.6–10)
CHLORIDE SERPL-SCNC: 106 MMOL/L (ref 98–107)
COLOR UR AUTO: ABNORMAL
CREAT SERPL-MCNC: 0.82 MG/DL (ref 0.51–0.95)
DEPRECATED HCO3 PLAS-SCNC: 23 MMOL/L (ref 22–29)
EGFRCR SERPLBLD CKD-EPI 2021: >90 ML/MIN/1.73M2
EOSINOPHIL # BLD AUTO: 0.3 10E3/UL (ref 0–0.7)
EOSINOPHIL NFR BLD AUTO: 3 %
ERYTHROCYTE [DISTWIDTH] IN BLOOD BY AUTOMATED COUNT: 12.8 % (ref 10–15)
GLUCOSE SERPL-MCNC: 77 MG/DL (ref 70–99)
GLUCOSE UR STRIP-MCNC: NEGATIVE MG/DL
HCT VFR BLD AUTO: 40 % (ref 35–47)
HGB BLD-MCNC: 13.1 G/DL (ref 11.7–15.7)
HGB UR QL STRIP: ABNORMAL
IMM GRANULOCYTES # BLD: 0.1 10E3/UL
IMM GRANULOCYTES NFR BLD: 1 %
KETONES UR STRIP-MCNC: NEGATIVE MG/DL
LEUKOCYTE ESTERASE UR QL STRIP: ABNORMAL
LYMPHOCYTES # BLD AUTO: 2.5 10E3/UL (ref 0.8–5.3)
LYMPHOCYTES NFR BLD AUTO: 29 %
MCH RBC QN AUTO: 28.2 PG (ref 26.5–33)
MCHC RBC AUTO-ENTMCNC: 32.8 G/DL (ref 31.5–36.5)
MCV RBC AUTO: 86 FL (ref 78–100)
MONOCYTES # BLD AUTO: 0.8 10E3/UL (ref 0–1.3)
MONOCYTES NFR BLD AUTO: 10 %
NEUTROPHILS # BLD AUTO: 4.9 10E3/UL (ref 1.6–8.3)
NEUTROPHILS NFR BLD AUTO: 57 %
NITRATE UR QL: NEGATIVE
NRBC # BLD AUTO: 0 10E3/UL
NRBC BLD AUTO-RTO: 0 /100
PH UR STRIP: 5.5 [PH] (ref 5–7)
PLATELET # BLD AUTO: 315 10E3/UL (ref 150–450)
POTASSIUM SERPL-SCNC: 4 MMOL/L (ref 3.4–5.3)
PROT SERPL-MCNC: 7.6 G/DL (ref 6.4–8.3)
RBC # BLD AUTO: 4.65 10E6/UL (ref 3.8–5.2)
RBC #/AREA URNS AUTO: ABNORMAL /HPF
SODIUM SERPL-SCNC: 140 MMOL/L (ref 135–145)
SP GR UR STRIP: 1.01 (ref 1–1.03)
SQUAMOUS #/AREA URNS AUTO: ABNORMAL /LPF
UROBILINOGEN UR STRIP-MCNC: NORMAL MG/DL
WBC # BLD AUTO: 8.6 10E3/UL (ref 4–11)
WBC #/AREA URNS AUTO: ABNORMAL /HPF

## 2024-06-04 PROCEDURE — 99000 SPECIMEN HANDLING OFFICE-LAB: CPT | Performed by: INTERNAL MEDICINE

## 2024-06-04 PROCEDURE — 81001 URINALYSIS AUTO W/SCOPE: CPT | Performed by: INTERNAL MEDICINE

## 2024-06-04 PROCEDURE — 80053 COMPREHEN METABOLIC PANEL: CPT | Performed by: INTERNAL MEDICINE

## 2024-06-04 PROCEDURE — 86225 DNA ANTIBODY NATIVE: CPT | Performed by: INTERNAL MEDICINE

## 2024-06-04 PROCEDURE — 86160 COMPLEMENT ANTIGEN: CPT | Performed by: INTERNAL MEDICINE

## 2024-06-04 PROCEDURE — 84433 ASY THIOPURIN S-MTHYLTRNSFRS: CPT | Mod: 90 | Performed by: INTERNAL MEDICINE

## 2024-06-04 PROCEDURE — G2211 COMPLEX E/M VISIT ADD ON: HCPCS | Performed by: INTERNAL MEDICINE

## 2024-06-04 PROCEDURE — 85025 COMPLETE CBC W/AUTO DIFF WBC: CPT | Performed by: INTERNAL MEDICINE

## 2024-06-04 PROCEDURE — 36415 COLL VENOUS BLD VENIPUNCTURE: CPT | Performed by: INTERNAL MEDICINE

## 2024-06-04 PROCEDURE — 87086 URINE CULTURE/COLONY COUNT: CPT | Performed by: INTERNAL MEDICINE

## 2024-06-04 PROCEDURE — 99215 OFFICE O/P EST HI 40 MIN: CPT | Performed by: INTERNAL MEDICINE

## 2024-06-04 RX ORDER — HYDROXYCHLOROQUINE SULFATE 200 MG/1
200 TABLET, FILM COATED ORAL 2 TIMES DAILY
Qty: 180 TABLET | Refills: 1 | Status: SHIPPED | OUTPATIENT
Start: 2024-06-04

## 2024-06-04 RX ORDER — AZATHIOPRINE 50 MG/1
50 TABLET ORAL DAILY
Qty: 30 TABLET | Refills: 6 | Status: SHIPPED | OUTPATIENT
Start: 2024-06-04

## 2024-06-04 RX ORDER — PREDNISONE 5 MG/1
5 TABLET ORAL DAILY
Qty: 49 TABLET | Refills: 2 | Status: SHIPPED | OUTPATIENT
Start: 2024-06-04

## 2024-06-04 ASSESSMENT — PAIN SCALES - GENERAL: PAINLEVEL: EXTREME PAIN (9)

## 2024-06-04 NOTE — PATIENT INSTRUCTIONS
Diagnosis:  1.  Systemic lupus erythematosus: Fatigue, throat sores, chest tightness/pleurisy, facial rash suggest lupus flare.  I recommend a 2-week course of prednisone, continuing hydroxychloroquine and Benlysta, and adding low-dose azathioprine.    Plan:  1.  Continue hydroxychloroquine 400 mg daily.  Undergo annual ophthalmology evaluation for toxicity while on hydroxychloroquine.  2.  Continue Benlysta monthly infusion IV  3.  To address Benlysta dosing cycle symptoms and current flare, start azathioprine 50 mg once daily.  While on azathioprine, check TPMT; CBC, creatinine, AST, ALT every 4 to 6 months.  4.  Prednisone 20 mg daily for 7 days, then 15 mg daily for 7 days  5.  Magic mouthwash swish and swallow up to 4 times daily for 10 days for oral and throat ulcers  6.  Continue stringent protection measures from the sun with broad brimmed hats longsleeve clothing, and high SPF sunscreen  7.  Lupus activity markers, urinalysis, kidney and liver function, CBC today.  8.  Home test for COVID-19

## 2024-06-04 NOTE — PROGRESS NOTES
Rheumatology Visit     Dasia Nguyen MRN# [id number]   YOB: 1985 Age: 39 year old       Date of Visit: Jun 4, 2024   Primary care provider: Kaushik Luna          Assessment and Plan:         # Undifferentiated autoimmunity/systemic lupus like syndrome (malar rash, arthralgia, oral sores, pleurisy, photosensitivity, +dsDNA Neg AYAN, RF, ACPA Neg SSA/SSB, centromere, Scl-70, Sm )    Data: Monitoring labs done on 1/11/2024 showed normal CBC, AST, ALT, creatinine, ESR, CRP.     Discussion: Autoimmune syndrome featuring pleurisy, oral ulcerations, photosensitive rashes, joint pain, inflammatory marker elevation is flaring.  Although Benlysta has provided significant improvement, current flare and regularly recurring Benlysta dosing cycle dependent symptoms suggest incompletely suppressed systemic inflammatory/autoimmune disease.  Will continue Benlysta infusions monthly and Plaquenil 400 mg daily.  To address flaring lupus that persists despite use of Benlysta, and Benlysta dosing cycle dependent symptoms, will add low-dose azathioprine.  Plan TPMT measurement as azathioprine is started.  Plan 0.5 mg/kg daily to start.      Transaminitis: She has chronically elevated transaminases.  Follows with Dr. Caruso in hepatology.  Hepatic ultrasound done in 9/23 showed increased diffuse liver echogenicity consistent with fatty liver.  Negative F-actin, hepatitis A, hepatitis B, tissue transglutaminase antibody.  Transaminase elevation slowly improving in 2024.    Seizure like episodes : Seen by Neurology, and EEG is normal. Her seizures are not related to Epilepsy or seizure disorder.  She uses as needed Benadryl for seizure-like episodes and it helps.    Vaccinations: Vaccinations reviewed with Ms. Nguyen. Risks and benefits of vaccinations were discussed.  - Influenza: encouraged yearly vaccination  -Prevnar 20 : Recommend getting Prevnar 20 vaccine.  Prescription sent  - Zostavax: She had 2  episodes of shingles at age 9 and at college.  Recommend getting Shingrix vaccine.    High risk medication use :   Labs to be done every 8 to 12 weeks.  She had Plaquenil eye exam in summer 2023 which was normal.    Plan:  1.  Continue hydroxychloroquine 400 mg daily.  Undergo annual ophthalmology evaluation for toxicity while on hydroxychloroquine.  2.  Continue Benlysta monthly infusion IV  3.  To address Benlysta dosing cycle symptoms and current flare, start azathioprine 50 mg once daily.  While on azathioprine, check TPMT; CBC, creatinine, AST, ALT every 4 to 6 months.  4.  Prednisone 20 mg daily for 7 days, then 15 mg daily for 7 days  5.  Magic mouthwash swish and swallow up to 4 times daily for 10 days for oral and throat ulcers  6.  Continue stringent protection measures from the sun with broad brimmed hats longsleeve clothing, and high SPF sunscreen  7.  Lupus activity markers, urinalysis, kidney and liver function, CBC today.  8.  Home test for COVID-19    -- Orders placed this encounter  Orders Placed This Encounter   Procedures    DNA double stranded antibodies    Complement C3    Complement C4    Routine UA with Micro Reflex to Culture    Comprehensive metabolic panel    Thiopurine Methyltransferase RBC     RTC 4-5 mos    Leighton Bush MD  Staff Rheumatologist, [facility]    On the day of the encounter, a total of 50 minutes was spent in chart review, and in counseling and coordination of care, regarding the patient's complex medical problem of undifferentiated connective tissue disease/lupus like syndrome, pleurisy, sore throat    The longitudinal plan of care for the diagnosis(es)/condition(s) as documented were addressed during this visit. Due to the added complexity in care, I will continue to support En[place] in the subsequent management and with ongoing continuity of care.               Active Problem List:     Patient Active Problem List    Diagnosis Date Noted    Class 1 obesity due to  "excess calories with serious comorbidity and body mass index (BMI) of 32.0 to 32.9 in adult 01/08/2024     Priority: Medium    Hepatic steatosis 01/08/2024     Priority: Medium    Systemic lupus erythematosus (H) 08/06/2022     Priority: Medium    Chest pain on breathing 11/24/2021     Priority: Medium    Fatigue 11/24/2021     Priority: Medium    Multiple joint pain 11/24/2021     Priority: Medium    Photosensitivity 11/24/2021     Priority: Medium            History of Present Illness:   Dasia Nguyen has PMH of facial rash, joint pains, chest pain, +dsdna seen in the clinic for management of systemic lupus.  Patient was last seen by Dr. Herrera in January 2024.  At that visit, improved control of oral ulcerations and skin disease had been noted with use of Benlysta since early 2023.    [place]: AYAN-negative SLE: She was started on Plaquenil in 12/2021 and it helped little. Her repeat autoimmune serologies in 7/2022 showed elevated dsDNA of 18, normal C3/C4, AYAN, CBC, CMP. Due to worsening symptoms she was started on Methotrexate and was up to 6 tab once a week, but developed elevated LFT's and was discontinued.     Benlysta infusions were started in 12/2022. She has noted 80 % improvement after starting Infusions. Facial rash, oral sores, joint pains have reduced.  Intermittent flares have reduced.  Her last flare was in 11/23 when she had extreme exhaustion, malar rash and oral sores.  She is feeling much better now.  dsDNA levels have normalized after starting Benlysta infusions.    Interval history June 4, 2024    She reports 3 days of 'razor-sharp\" pain in her throat with difficulty swallowing.   She has had symptoms of lupus flare for a mouth; chest throbbing and tightness, extreme fatigue, cough (mildly productive), tactile fevers, butterfly rash.    Overall, SLE flares are better since starting benlysta in 2023.  She has remained on hydroxychloroquine; she tried methotrexate for 3 months, but liver " toxict happened.  Prednisone bursts and tapers have helped with flares, but steroids cause water weight gain, mood elevatiion, jitters; she had not used steroids for a year.  She gets benlysta every 28 days; it is well tolerated. She notes symptom cycling with benlysta dosing--about 1 week each month.    January 26, 2024 interval history, Dr. Herrera-She is on Benlysta infusion monthly and is doing very well.  It has helped her more than 80%.  Overall lupus flares have reduced in frequency.  Her last flare was in 11/23 when she had extreme fatigue, exhaustion, arthralgia, malar rash.  Overall oral sores, skin rashes, arthralgia have significantly improved on Benlysta infusions.  Denies any side effects on Benlysta. She is getting infusions at home. When in flare her fingers hurt. Sometimes she has pain in her wrist and 5th digits. She still gets fatigued easily. She still get seizures like symptoms when she is extremely tired. When she has sun exposure she gets bad rash on her body. She had eye exam in 6/23. She reports hx of Shingles when she was in 7th grade and in college.     History from initial visit 2022 Dr. Herrera : She was in her usual state of health up until 3 years ago when she started noticing fatigue, joint pains, on and off chest pains. Her symptoms came as flare ups when all her symptoms would increase many folds. Her last severe flare was summer 2021 She had severe body aches, joint pains and could not get out of bed for couple weeks. In addition she was extremely fatigued and had headaches, dizziness, chest pains and oral sores. She also noticed being very photosensitive and developed facial rash. She saw dermatologist at [facility] who diagnosed her with acute cutaneous lupus.     Beginning May 2021, she started having seizures like episodes at night. She describes it as hot tingling sensation and her body will shake for 15 - 20 sec. She has fallen out of bed during those  episodes. She was seen by Neurologist and had EEG which did not show epilepsy. MRI head, C-spine, T-spine did not show demyelinated lesions. She was referred to cardiology to make sure that her symptoms are not related to Arrhythmias. Echocardiogram was normal. CT chest showed some calcification in the region of LAD.     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 Her ankles will hurt. In the morning she does not have strength to grasp things. She gets pain in her wrists, elbows. She has fatigue and feel she has flu every day. She has headaches every day. She takes naps during the day. Her chest hurt, feel as if someone is stepping on it. She always take shallow breaths. She was given medrol dos russ and responded well to it.     She has herniated discs in her back and always had chronic low back pain.    She gets recurrent UTI and possibly had kidney stones.     Autoimmune work up showed Neg AYAN, KIANNA, RF, ACPA but positive dsDNA. She had mildly elevated ESR of 26 in 6/2021.   Denies any raynauds, sicca symptoms, recurrent sinusitis/rhinitis, swallowing difficulty, hearing or visual changes recently. No h/o arterial/venous thrombosis in the past. Hx of one 5 week pregnancy loss.    July 6, 2022 - Plaquenil has helped with her facial rash. 2 months ago she started having seizures. When she is on flare up she gets seizures 5 hours straight. Whole body is so tired, she is sleeping 13 hours a day. She has had EEG which was normal. She was told to take Benadryl when it will happen.  She is shaking, she does not have control, hitting herself. She has overall feeling of being sick, can not get out of bed.  She has 5 kids. She has feeling of being sick all the time. She has pain in her joints. She gets pain in her thumb joints, pinky, big toes. She has noticed that when she is very tired she gets flutters in her chest. She gets oral sores, big ones on the inner lip, right nostril. She has tried  prednisone and felt that it helped with inflammation, joint aches. Every couple months she gets very sick. When she is walking a lot she gets swelling and puffiness in her ankles. She takes Ibuprofen as needed.   September 8, 2022 - She has not had facial rash and oral sores in a while. She feel that her fingers are not hurting. She feel that her chest is very tight. She is on Methotrexate 6 tab once a week for 7 weeks. She is on Plaquenil 200 mg twice daily. She is off of prednisone. She still get seizure like symptoms and use Benadryl as needed for these symptoms. She gets numbness in her arms when she lie down.   March 24, 2023 - She was throwing up all night last night. If she is laying on her back then she gets Abdominal pain and nausea. She is on Benlysta infusions monthly since 12/2022 and Plaquenil 400 mg daily for SLE. She has overall noticed improvement on Benlysta. She has more good days than bad days. Facial rash has resolved. A month ago she had flare up and had facial rash as well. Oral sores have reduced in frequency. She has scheduled appointment for eye exam in April 2023.  August 3, 2023 - She feel very fatigued and gets flu like illness.  Joints in her toes ache. Benlysta has helped with her joint pains. After her last infusion she was very sick and had couple days of seizures like episode. She was very tired, nauseated and sick. She does not get mouth ulcers now on Benlysta. Malar rash has resolved on her face. When she is sun, photosensitivity has reduced on Benlysta. She gets hot flashes and palpitations sometimes. She is on Plaquenil 400 mg daily and is due for HCQ eye exam. She takes Benadryl as needed for seizures.            Review of Systems:     Review Of Systems  Constitutional: denies fever, chills, night sweats and weight loss.  Skin: no skin rash.  Eyes: No dryness or irritation in eyes. No episode of eye inflammation or redness.   Ears/Nose/Throat: no recurrent sinus  infections.  Respiratory: No shortness of breath, dyspnea on exertion, cough, or hemoptysis  Cardiovascular: No chest pain or palpitations.  Gastrointestinal: no nausea, vomiting, abdominal pain.  Normal bowel movements.  Genitourinary: no dysuria, frequency  or hematuria.  Musculoskeletal: as in HPI  Neurologic: no numbness, tingling.  Psychiatric: no mood disorders.  Hematologic/Lymphatic/Immunologic: no history of easy bruising, petechia or purpura.  No abnormal bleeding.   Endocrine: no h/o thyroid disease or Diabetes.                  Past Medical History:     Past Medical History:   Diagnosis Date    Chest pain on breathing     Fatigue     Multiple joint pain     Photosensitivity      Past Surgical History:   Procedure Laterality Date    ABDOMEN SURGERY  8/10/12, 9/28/13, 2/13/15,9/12/16,9/2019    Four C-sections, tummy tuck    BREAST SURGERY  9/2019    Breast reduction    COMBINED ESOPHAGOSCOPY, GASTROSCOPY, DUODENOSCOPY (EGD) WITH CO2 INSUFFLATION N/A 3/15/2024    Procedure: Combined Esophagoscopy, Gastroscopy, Duodenoscopy (Egd) With Co2 Insufflation;  Surgeon: Luci Larkin DO;  Location: [facility] OR    diastasis recti      ESOPHAGOSCOPY, GASTROSCOPY, DUODENOSCOPY (EGD), COMBINED N/A 3/15/2024    Procedure: ESOPHAGOGASTRODUODENOSCOPY, WITH BIOPSY;  Surgeon: Luci Larkin DO;  Location: [facility] OR    HERNIA REPAIR  01/2021    Umbilical hernia    IR CHEST PORT PLACEMENT > 5 YRS OF AGE  03/31/2023    NO HISTORY OF SURGERY      VASCULAR SURGERY  03/2023    Port placement            Social History:     Social History     Occupational History    Not on file   Tobacco Use    Smoking status: Never    Smokeless tobacco: Never   Vaping Use    Vaping status: Never Used   Substance and Sexual Activity    Alcohol use: Never    Drug use: Never    Sexual activity: Yes     Partners: Male     Birth control/protection: Male Surgical            Family History:     Family History   Problem Relation Age of Onset    Lupus Maternal  Uncle     Sarcoidosis Maternal Aunt     Hypertension Father     Diabetes Other             Allergies:     Allergies   Allergen Reactions    Other Environmental Allergy Swelling     Cat & long haired dog dander - eyes swell, hives, airway gets tight - has never needed Epi Pen            Medications:     Current Outpatient Medications   Medication Sig Dispense Refill    azaTHIOprine (IMURAN) 50 MG tablet Take 1 tablet (50 mg) by mouth daily 30 tablet 6    Belimumab (BENLYSTA IV)       diphenhydrAMINE (BENADRYL) 25 MG tablet Take 2-4 tablets by mouth every 6 hours as needed      furosemide (LASIX) 20 MG tablet Take 20 mg by mouth daily      hydroxychloroquine (PLAQUENIL) 200 MG tablet Take 1 tablet (200 mg) by mouth 2 times daily 180 tablet 1    magic mouthwash (ENTER INGREDIENTS IN COMMENTS) suspension Guafenesin - 70cc  2% viscous lidocaine - 30cc  Diphenhydramine (12.5/5cc) - 200cc  Nystatin - 30 cc  Sucralfate - 100cc  Maalox - 50cc  Disp: 480 cc(16 oz)  Sig: Use 3 tsp.(15cc), t.i.d. Swish for 3 mins, gargle and expectorate for 2 weeks. Then use prn for maintenance. 480 mL 1    Multiple Vitamin (MULTIVITAMINS PO) Take 1 tablet by mouth daily      omeprazole (PRILOSEC) 20 MG DR capsule Take 1 capsule (20 mg) by mouth daily 30 capsule 0    predniSONE (DELTASONE) 5 MG tablet Take 1 tablet (5 mg) by mouth daily Take 4 tabs daily for 1 week, then take 3 tabs daily for 1 week 49 tablet 2            Physical Exam:   Blood pressure 120/81, pulse 104, SpO2 99%.  Wt Readings from Last 4 Encounters:   03/06/24 93.8 kg (206 lb 14.4 oz)   01/26/24 94.3 kg (208 lb)   01/08/24 94.7 kg (208 lb 11.2 oz)   01/08/24 94.7 kg (208 lb 11.2 oz)       Constitutional: Moderately built, appears stated age, cooperative, chronically ill-appearing, hoarse voice.  ENT: No oral ulcerations no scleral disease no cervical lymphadenopathy    Musculoskeletal: No synovitis, tenderness present over MCP, PIP, DIP joints, bilateral wrists, elbows.   Normal range of motion of shoulders.  No synovitis and tenderness over ankles, MTP joints.  No knee effusions.  Normal muscle strength.      Skin: No active raynaud's, malar blush with blanching, non scaly,poorly marginated erythema noted over face and initial distribution and forearms.         Data:     No results found for any visits on 06/04/24.      Hemoglobin   Date Value Ref Range Status   01/11/2024 13.4 11.7 - 15.7 g/dL Final   09/25/2023 13.2 11.7 - 15.7 g/dL Final   08/03/2023 13.6 11.7 - 15.7 g/dL Final     Urea Nitrogen   Date Value Ref Range Status   07/06/2022 12 7 - 30 mg/dL Final   11/24/2021 13 7 - 30 mg/dL Final     Erythrocyte Sedimentation Rate   Date Value Ref Range Status   01/11/2024 16 0 - 20 mm/hr Final   08/03/2023 21 (H) 0 - 20 mm/hr Final   03/24/2023 18 0 - 20 mm/hr Final     CRP Inflammation   Date Value Ref Range Status   11/24/2021 3.1 0.0 - 8.0 mg/L Final     AST   Date Value Ref Range Status   01/11/2024 40 0 - 45 U/L Final     Comment:     Reference intervals for this test were updated on 6/12/2023 to more accurately reflect our healthy population. There may be differences in the flagging of prior results with similar values performed with this method. Interpretation of those prior results can be made in the context of the updated reference intervals.   09/25/2023 46 (H) 0 - 45 U/L Final     Comment:     Reference intervals for this test were updated on 6/12/2023 to more accurately reflect our healthy population. There may be differences in the flagging of prior results with similar values performed with this method. Interpretation of those prior results can be made in the context of the updated reference intervals.   08/03/2023 61 (H) 0 - 45 U/L Final     Comment:     Reference intervals for this test were updated on 6/12/2023 to more accurately reflect our healthy population. There may be differences in the flagging of prior results with similar values performed with this method.  Interpretation of those prior results can be made in the context of the updated reference intervals.     Albumin   Date Value Ref Range Status   01/11/2024 4.2 3.5 - 5.2 g/dL Final   09/25/2023 4.2 3.5 - 5.2 g/dL Final   08/03/2023 4.3 3.5 - 5.2 g/dL Final   02/10/2023 3.8 3.4 - 5.0 g/dL Final   09/08/2022 4.1 3.4 - 5.0 g/dL Final   07/06/2022 3.8 3.4 - 5.0 g/dL Final     Alkaline Phosphatase   Date Value Ref Range Status   01/11/2024 115 40 - 150 U/L Final     Comment:     Reference intervals for this test were updated on 11/14/2023 to more accurately reflect our healthy population. There may be differences in the flagging of prior results with similar values performed with this method. Interpretation of those prior results can be made in the context of the updated reference intervals.   09/25/2023 111 (H) 35 - 104 U/L Final   04/04/2023 107 (H) 35 - 104 U/L Final     ALT   Date Value Ref Range Status   01/11/2024 77 (H) 0 - 50 U/L Final     Comment:     Reference intervals for this test were updated on 6/12/2023 to more accurately reflect our healthy population. There may be differences in the flagging of prior results with similar values performed with this method. Interpretation of those prior results can be made in the context of the updated reference intervals.     09/25/2023 91 (H) 0 - 50 U/L Final     Comment:     Reference intervals for this test were updated on 6/12/2023 to more accurately reflect our healthy population. There may be differences in the flagging of prior results with similar values performed with this method. Interpretation of those prior results can be made in the context of the updated reference intervals.     08/03/2023 100 (H) 0 - 50 U/L Final     Comment:     Reference intervals for this test were updated on 6/12/2023 to more accurately reflect our healthy population. There may be differences in the flagging of prior results with similar values performed with this method.  Interpretation of those prior results can be made in the context of the updated reference intervals.       Rheumatoid Factor   Date Value Ref Range Status   11/24/2021 <7 <12 IU/mL Final     Recent Labs   Lab Test 01/11/24  1203 09/25/23  1337 08/03/23  1035 04/04/23  1645 09/08/22  1111 07/06/22  1153 11/24/21  1239   WBC 7.5 8.8 6.6  --    < > 6.2 7.1   HGB 13.4 13.2 13.6  --    < > 13.6 14.1   HCT 40.5 39.2 40.9  --    < > 40.4 42.3   MCV 88 86 86  --    < > 85 87    358 363  --    < > 314 395   BUN  --   --   --   --   --  12 13   TSH  --   --  0.87  --   --   --   --    AST 40 46* 61*  --    < > 10 18   ALT 77* 91* 100*  --    < > 22 33   ALKPHOS 115 111*  --  107*   < > 89 106    < > = values in this interval not displayed.        Outside studies reviewed: Results from [facility] reviewed

## 2024-06-05 LAB
C3 SERPL-MCNC: 168 MG/DL (ref 81–157)
C4 SERPL-MCNC: 48 MG/DL (ref 13–39)
DSDNA AB SER-ACNC: 3.7 IU/ML

## 2024-06-06 LAB — BACTERIA UR CULT: NO GROWTH

## 2024-06-08 LAB — TPMT BLD-CCNC: 23.7 U/ML

## 2024-06-13 ENCOUNTER — TELEPHONE (OUTPATIENT)
Dept: RHEUMATOLOGY | Facility: CLINIC | Age: 39
End: 2024-06-13
Payer: COMMERCIAL

## 2024-06-13 NOTE — TELEPHONE ENCOUNTER
Received fax from [facility] with the following message:     predniSONE (DELTASONE) 5 MG tablet 49 tablet 2 6/4/2024 -- No   Sig - Route: Take 1 tablet (5 mg) by mouth daily Take 4 tabs daily for 1 week, then take 3 tabs daily for 1 week - Oral   Sent to pharmacy as: predniSONE 5 MG Oral Tablet (DELTASONE)   Class: E-Prescribe   Order: [id number]   E-Prescribing Status: Receipt confirmed by pharmacy (6/4/2024  3:48 PM CDT)     Please clarify directions. There are two directions.     STEFANIA Cortés [facility]   Medical Specialties: Rheumatology & Infectious Disease  Phone: [phone]  Fax: [phone]

## 2024-06-14 NOTE — TELEPHONE ENCOUNTER
"  predniSONE (DELTASONE) 5 MG tablet      Last Written Prescription Date:  6/4/24  Last Fill Quantity: 49,   # refills: 2  Last Office Visit : 6/4/24  Future Office visit:  12/3/24    Routing  request to provider team because:  Pharmacy requesting clarification: Current sig\" Take 1 tablet (5 mg) by mouth daily Take 4 tabs daily for 1 week, then take 3 tabs daily for 1 week \"  [id number] - [employer] [id number] AT [facility] [address]    [phone]     "

## 2024-06-24 NOTE — TELEPHONE ENCOUNTER
I am sorry about the confusion.  I agree with the orders for prednisone 20 mg daily for 7 days, then 15 mg daily for 7 days.   decreased stride length/decreased step length/decreased weight-shifting ability/decreased marleny

## 2024-06-24 NOTE — TELEPHONE ENCOUNTER
Sig for prednisone: Take 1 tablet (5 mg) by mouth daily Take 4 tabs daily for 1 week, then take 3 tabs daily for 1 week     Called [facility]'s pharmacy and gave verbal orders for the prednisone as written in the visit with Dr. Bush:    Per provider visit 6/4/24.  Plan:  Prednisone 20 mg daily for 7 days, then 15 mg daily for 7 days       Shelly Arechiga RN

## 2024-07-12 ENCOUNTER — TELEPHONE (OUTPATIENT)
Dept: RHEUMATOLOGY | Facility: CLINIC | Age: 39
End: 2024-07-12

## 2024-07-12 ENCOUNTER — DOCUMENTATION ONLY (OUTPATIENT)
Dept: PHARMACY | Facility: CLINIC | Age: 39
End: 2024-07-12
Payer: COMMERCIAL

## 2024-07-12 NOTE — TELEPHONE ENCOUNTER
Spoke to patient.  Patient is leaving town to a camp on Sunday 7/14/24 to Saturday 7/20/24 next week.   Patient got a notification that a GI bug is going around the camp.   Patient had her Benlysta infusion yesterday, she has a spare Saline bag at home and has IV access equipment/tubing.   Patient is worried that she will become very ill and need to have IV hydration right away.    She states she would like to have an order for [facility] come to her house to to give her a Peripheral IV access to have just in case the patient needs fluids next week.  She would bring the saline bag from home and have the [facility] medical staff give her the IV saline.    Called [facility], they states this is not doable as the patient would need training on how to keep the area clean, they do not leave IV access in place for just in case.   There would also need to be orders for the hydration. There is no confirmation that the [facility] medical staff would give her hydration     Called the patient, advised that if she does get ill, she should be evaluated at a nearby clinic, Urgent Care or ER right away and if she needed fluids, she could get them there.    Also discussed bringing Pedialyte on her trip.   Patient agreed to plan.    Shelly Arechiga RN

## 2024-07-12 NOTE — TELEPHONE ENCOUNTER
Agree with recommendations to seek evaluation if symptoms occur at camp.   Benlysta will not cause significant increase in susceptibility to viral/bacterial infection. Enjoy camp!

## 2024-07-12 NOTE — TELEPHONE ENCOUNTER
[facility] Call Center    Phone Message    May a detailed message be left on voicemail: yes     Reason for Call: Other: Pt is calling to speak to a nurse about upcoming vacation this weekend to [place]. Pt is wondering if she can get a bag of saline and self access or a nurse to help her with access today or tomorrow. This is just a precautionary for incase if she gets sick on vacation. Pt would like a call back this morning/afternoon if possible. Please call pt back at [phone].     Action Taken: Message routed to:  Adult Clinics: Rheumatology p [id number]    Travel Screening: Not Applicable     Date of Service: 7/12

## 2024-07-13 NOTE — PROGRESS NOTES
Skilled Nurse visit in the Patient Home to administer Benlysta (Belimumab) with concominant Sodium Chloride 0.9% 250 mL IV.  No recent elevated temperature, fever, chills, productive cough, coughing for 3 weeks or longer or hemoptysis, abnormal vital signs, night sweats, chest pain. No  decrease in your appetite, unexplained weight loss or fatigue.  No other new onset medical symptoms.  Current weight 205 lbs.  Implanted port right  chest , 1 attempt Pre medicated with Tylenol PO and Benadryl IVP. Labs drawn n/a. Infusion completed without complication or reaction. Pt reports therapy iseffective in managing symptoms related to therapy.

## 2024-07-15 ENCOUNTER — TELEPHONE (OUTPATIENT)
Dept: RHEUMATOLOGY | Facility: CLINIC | Age: 39
End: 2024-07-15
Payer: COMMERCIAL

## 2024-07-15 NOTE — TELEPHONE ENCOUNTER
Patient and her aunt were in previous communication to get her established with Dr. Damon. In the meantime, she was seen and established with Dr. Bush. Patient was called to clarify if she is still requesting to have care through Dr. Damon. Writer explained this would need to be approved.  Left call back number.     Dana Courtney RN  Adult Rheumatology Clinic

## 2024-08-06 ENCOUNTER — MEDICAL CORRESPONDENCE (OUTPATIENT)
Dept: HEALTH INFORMATION MANAGEMENT | Facility: CLINIC | Age: 39
End: 2024-08-06

## 2024-09-05 ENCOUNTER — ENROLLMENT (OUTPATIENT)
Dept: HOME HEALTH SERVICES | Facility: HOME HEALTH | Age: 39
End: 2024-09-05
Payer: COMMERCIAL

## 2024-09-09 ENCOUNTER — DOCUMENTATION ONLY (OUTPATIENT)
Dept: PHARMACY | Facility: CLINIC | Age: 39
End: 2024-09-09
Payer: COMMERCIAL

## 2024-09-09 NOTE — PROGRESS NOTES
Skilled Nurse visit in the Patient Home to administer 920mg IV Benlysta.  No recent elevated temperature, fever, chills, productive cough, coughing for 3 weeks or longer or hemoptysis, abnormal vital signs, night sweats, chest pain. No  decrease in your appetite, unexplained weight loss or fatigue.  No other new onset medical symptoms.  Current weight approx 205lb.  [id number]  chest , [id number] Pre medicated with 25mg IV diphenhydramine and 650mg oral acetaminophen 30minutes prior to infusion. Labs drawn NONE. Infusion completed without complication or reaction. Pt reports therapy iseffective in managing symptoms related to therapy.        ABEL Acosta RN  [facility]   [phone]  [email or web address]

## 2024-10-04 ENCOUNTER — HOME INFUSION (OUTPATIENT)
Dept: HOME HEALTH SERVICES | Facility: HOME HEALTH | Age: 39
End: 2024-10-04
Payer: COMMERCIAL

## 2024-10-04 ENCOUNTER — TELEPHONE (OUTPATIENT)
Dept: RHEUMATOLOGY | Facility: CLINIC | Age: 39
End: 2024-10-04
Payer: COMMERCIAL

## 2024-10-04 ENCOUNTER — DOCUMENTATION ONLY (OUTPATIENT)
Dept: HOME HEALTH SERVICES | Facility: HOME HEALTH | Age: 39
End: 2024-10-04

## 2024-10-04 DIAGNOSIS — M32.9 SYSTEMIC LUPUS ERYTHEMATOSUS, UNSPECIFIED SLE TYPE, UNSPECIFIED ORGAN INVOLVEMENT STATUS (H): Primary | ICD-10-CM

## 2024-10-04 RX ORDER — SODIUM CHLORIDE 9 MG/ML
INJECTION, SOLUTION INTRAVENOUS CONTINUOUS
Start: 2024-10-04

## 2024-10-04 NOTE — TELEPHONE ENCOUNTER
----- Message from Shelly JENNINGS sent at 10/3/2024  3:26 PM CDT -----  Regarding: RE: RPN Hydration Order  Gilberto Leong, this patient sees Dr. Bush.  Thanks!  ----- Message -----  From: Kelsie Duncan RN  Sent: 10/3/2024   3:07 PM CDT  To: Shelly Arechiga RN; Felicita Herrera MD; #  Subject: RPN Hydration Order                              Gilberto Bravo,     This patient called and requested an order for Hydration PRN after her infusions as she states she feels very fatigued 48 hours after infusion. Would this be okay? The other option would be to increase the amount of fluids she receives concomitant with her Benlysta. Currently she is receiving 250ml of NACL 0.9% with her Benlysta infusion.       Thanks,  LUZ Iglesias  Specialty Infusion Care Coordinator  [facility]

## 2024-10-04 NOTE — TELEPHONE ENCOUNTER
500cc normal saline to be given prn post infusion added to the patients therapy plan.  Please review and sign.     Dang Lopez RN

## 2024-10-08 VITALS — BODY MASS INDEX: 32.11 KG/M2 | WEIGHT: 205 LBS

## 2024-10-08 RX ORDER — ACETAMINOPHEN 325 MG/1
650 TABLET ORAL
Qty: 24 TABLET | Refills: 0 | Status: DISPENSED | OUTPATIENT
Start: 2024-10-23 | End: 2025-10-04

## 2024-10-08 RX ORDER — EPINEPHRINE 0.3 MG/.3ML
0.3 INJECTION SUBCUTANEOUS PRN
Qty: 999999 ML | Refills: 0 | Status: ACTIVE | OUTPATIENT
Start: 2024-10-23 | End: 2025-10-04

## 2024-10-08 RX ORDER — DIPHENHYDRAMINE HYDROCHLORIDE 50 MG/ML
50 INJECTION INTRAMUSCULAR; INTRAVENOUS PRN
Qty: 99999 ML | Refills: 0 | Status: ACTIVE | OUTPATIENT
Start: 2024-10-23 | End: 2025-10-04

## 2024-10-08 RX ORDER — SODIUM CHLORIDE 9 MG/ML
500 INJECTION, SOLUTION INTRAVENOUS PRN
Qty: 999999 ML | Refills: 0 | Status: ACTIVE | OUTPATIENT
Start: 2024-10-23 | End: 2025-10-04

## 2024-10-08 RX ORDER — WATER 10 ML/10ML
11.1 INJECTION INTRAMUSCULAR; INTRAVENOUS; SUBCUTANEOUS
Qty: 133.2 ML | Refills: 0 | Status: DISPENSED | OUTPATIENT
Start: 2024-10-23 | End: 2025-10-04

## 2024-10-09 ENCOUNTER — TRANSFERRED RECORDS (OUTPATIENT)
Dept: HEALTH INFORMATION MANAGEMENT | Facility: CLINIC | Age: 39
End: 2024-10-09

## 2024-10-28 ENCOUNTER — HOME INFUSION (OUTPATIENT)
Dept: HOME HEALTH SERVICES | Facility: HOME HEALTH | Age: 39
End: 2024-10-28
Payer: COMMERCIAL

## 2024-10-31 ENCOUNTER — HOME INFUSION BILLING (OUTPATIENT)
Dept: HOME HEALTH SERVICES | Facility: HOME HEALTH | Age: 39
End: 2024-10-31
Payer: COMMERCIAL

## 2024-10-31 PROCEDURE — A4213 20+ CC SYRINGE ONLY: HCPCS

## 2024-10-31 PROCEDURE — A4217 STERILE WATER/SALINE, 500 ML: HCPCS

## 2024-10-31 PROCEDURE — S1015 IV TUBING EXTENSION SET: HCPCS

## 2024-10-31 PROCEDURE — A4930 STERILE, GLOVES PER PAIR: HCPCS

## 2024-10-31 PROCEDURE — A4215 STERILE NEEDLE: HCPCS

## 2024-10-31 PROCEDURE — E1399 DURABLE MEDICAL EQUIPMENT MI: HCPCS

## 2024-11-01 ENCOUNTER — HOME CARE VISIT (OUTPATIENT)
Dept: HOME HEALTH SERVICES | Facility: HOME HEALTH | Age: 39
End: 2024-11-01
Payer: COMMERCIAL

## 2024-11-01 VITALS
TEMPERATURE: 98 F | HEART RATE: 94 BPM | DIASTOLIC BLOOD PRESSURE: 72 MMHG | RESPIRATION RATE: 18 BRPM | OXYGEN SATURATION: 99 % | SYSTOLIC BLOOD PRESSURE: 108 MMHG

## 2024-11-01 PROCEDURE — S9338 HIT IMMUNOTHERAPY DIEM: HCPCS

## 2024-11-02 NOTE — PROGRESS NOTES
"Infusion Nursing Note:  Skilled nurse visit today for benlysta infusio  for Dasia Nguyen.   present during visit today: Not Applicable.    Pre-infusion Checklist:   Pre-infusion Checklist    Have you had any delayed reaction since last infusion?   No    Have you recently had an elevated temperature, fever, chills productive cough, coughing for 3 weeks or longer or hemoptysis, abnormal vital signs, night sweats, chest pain, or have you noticed a decrease in your appetite, or noted unexplained weight loss or fatigue?   No    Do you have any open wounds or new incisions?  No    Do you have any recent or upcoming hospitalizations, surgeries, or dental procedures?   No    Do you currently have or recently have had any signs of illness or infection or are you on any antibiotics?   No    Have you had any new, sudden , or worsening abdominal pain?   No    Have you or anyone in your household received a live vaccination in the past 4 weeks?   No    Have you recently been diagnosed with any new nervous system diseases or cancer diagnosis? (i.e., Multiple Sclerosis, Guillain [place], sezures, neurological changes) Are you receiving any form of radiation or chemotherapy?   No    Are you pregnant or breastfeeding, or do you have plans of pregnancy in the future?   No    Have you been having any signs of worsening depression or suicidal ideation?  No    Have there been any other new onset medical symptoms?  No    Did the patient answer \"YES\" to any of the questions above?  No    Will the patient receive a medication that has an order for infusion reaction management?  Yes, and all drugs and supplies are available and none have .    If ordered, has the patient taken pre-medications?  Yes    Plan:   Therapy is appropriate, will proceed with treatment.     Chemotherapy Treatment Conditions:   Not Applicable    Intravenous Access:  Implanted Port.    Post Infusion Assessment:  Patient tolerated infusion without " incident.     Note: Patient alert oriented for today s visit port Cath access with one attempt. Infusion completed without difficulty. Patient requested to remain accessed. Patient had no questions or concerns prior to my departure.    Next Visit Plan:   11/29/2024       Sanjuana Harding RN 11/1/2024

## 2024-11-04 ENCOUNTER — MEDICAL CORRESPONDENCE (OUTPATIENT)
Dept: HEALTH INFORMATION MANAGEMENT | Facility: CLINIC | Age: 39
End: 2024-11-04
Payer: COMMERCIAL

## 2024-11-18 ENCOUNTER — HOME INFUSION (OUTPATIENT)
Dept: HOME HEALTH SERVICES | Facility: HOME HEALTH | Age: 39
End: 2024-11-18
Payer: COMMERCIAL

## 2024-11-18 DIAGNOSIS — M32.9 SYSTEMIC LUPUS ERYTHEMATOSUS, UNSPECIFIED SLE TYPE, UNSPECIFIED ORGAN INVOLVEMENT STATUS (H): Primary | ICD-10-CM

## 2024-11-27 ENCOUNTER — HOME INFUSION BILLING (OUTPATIENT)
Dept: HOME HEALTH SERVICES | Facility: HOME HEALTH | Age: 39
End: 2024-11-27
Payer: COMMERCIAL

## 2024-11-27 PROCEDURE — A4217 STERILE WATER/SALINE, 500 ML: HCPCS

## 2024-11-27 PROCEDURE — S1015 IV TUBING EXTENSION SET: HCPCS

## 2024-11-27 PROCEDURE — A4930 STERILE, GLOVES PER PAIR: HCPCS

## 2024-11-27 PROCEDURE — E1399 DURABLE MEDICAL EQUIPMENT MI: HCPCS

## 2024-11-27 PROCEDURE — A4215 STERILE NEEDLE: HCPCS

## 2024-11-27 PROCEDURE — A4213 20+ CC SYRINGE ONLY: HCPCS

## 2024-11-29 DIAGNOSIS — R76.8 POSITIVE DOUBLE STRANDED DNA ANTIBODY TEST: ICD-10-CM

## 2024-11-29 DIAGNOSIS — L93.2 CUTANEOUS LUPUS ERYTHEMATOSUS: ICD-10-CM

## 2024-11-29 PROCEDURE — S9338 HIT IMMUNOTHERAPY DIEM: HCPCS

## 2024-11-29 PROCEDURE — E0781 EXTERNAL AMBULATORY INFUS PU: HCPCS | Mod: RR

## 2024-12-02 ENCOUNTER — HOME CARE VISIT (OUTPATIENT)
Dept: HOME HEALTH SERVICES | Facility: HOME HEALTH | Age: 39
End: 2024-12-02
Payer: COMMERCIAL

## 2024-12-02 ENCOUNTER — ENROLLMENT (OUTPATIENT)
Dept: HOME HEALTH SERVICES | Facility: HOME HEALTH | Age: 39
End: 2024-12-02
Payer: COMMERCIAL

## 2024-12-02 ENCOUNTER — HOME INFUSION BILLING (OUTPATIENT)
Dept: HOME HEALTH SERVICES | Facility: HOME HEALTH | Age: 39
End: 2024-12-02
Payer: COMMERCIAL

## 2024-12-02 PROCEDURE — S5517 HIT DECLOTTING KIT: HCPCS

## 2024-12-02 RX ORDER — WATER 10 ML/10ML
2.2 INJECTION INTRAMUSCULAR; INTRAVENOUS; SUBCUTANEOUS PRN
Qty: 999999 ML | Refills: 0 | Status: DISPENSED | OUTPATIENT
Start: 2024-12-02 | End: 2025-12-02

## 2024-12-03 NOTE — PROGRESS NOTES
Nursing Visit Note:  Nurse visit today for Cathflo administration  for Dasia Nguyen.     present during visit today: Not Applicable.    Cathflo dwelled in port for 40 minutes, blood return verified.      Radha Silva RN 12/2/2024

## 2024-12-05 RX ORDER — HYDROXYCHLOROQUINE SULFATE 200 MG/1
200 TABLET, FILM COATED ORAL 2 TIMES DAILY
Qty: 180 TABLET | Refills: 0 | Status: SHIPPED | OUTPATIENT
Start: 2024-12-05

## 2024-12-05 NOTE — TELEPHONE ENCOUNTER
PLAQUENIL 200 MG   Last Written Prescription Date:  6/4/24  Last Fill Quantity: 180,   # refills: 1  Last Office Visit: 12/3/24  Future Office visit: 7/2/25    Last Eye Exam: UNK  Refill Team has reviewed Health Maint., CareEveryWhere and Media.     Route to Clinic Team for further review  Over due Eye exam   PLAQUENIL LETTER  11/27/24

## 2024-12-19 ENCOUNTER — EPISODE UPDATE (OUTPATIENT)
Dept: HOME HEALTH SERVICES | Facility: HOME HEALTH | Age: 39
End: 2024-12-19
Payer: COMMERCIAL

## 2024-12-24 ENCOUNTER — HOME INFUSION (OUTPATIENT)
Dept: HOME HEALTH SERVICES | Facility: HOME HEALTH | Age: 39
End: 2024-12-24
Payer: COMMERCIAL

## 2024-12-24 DIAGNOSIS — M32.9 SYSTEMIC LUPUS ERYTHEMATOSUS, UNSPECIFIED SLE TYPE, UNSPECIFIED ORGAN INVOLVEMENT STATUS (H): ICD-10-CM

## 2024-12-26 ENCOUNTER — HOME INFUSION BILLING (OUTPATIENT)
Dept: HOME HEALTH SERVICES | Facility: HOME HEALTH | Age: 39
End: 2024-12-26
Payer: COMMERCIAL

## 2024-12-26 PROCEDURE — A4930 STERILE, GLOVES PER PAIR: HCPCS

## 2024-12-26 PROCEDURE — S1015 IV TUBING EXTENSION SET: HCPCS

## 2024-12-26 PROCEDURE — A4215 STERILE NEEDLE: HCPCS

## 2024-12-26 PROCEDURE — A4217 STERILE WATER/SALINE, 500 ML: HCPCS | Mod: JZ

## 2024-12-26 PROCEDURE — E1399 DURABLE MEDICAL EQUIPMENT MI: HCPCS

## 2024-12-26 PROCEDURE — A4213 20+ CC SYRINGE ONLY: HCPCS

## 2024-12-27 PROCEDURE — S9338 HIT IMMUNOTHERAPY DIEM: HCPCS

## 2024-12-29 PROCEDURE — E0781 EXTERNAL AMBULATORY INFUS PU: HCPCS | Mod: RR

## 2024-12-30 ENCOUNTER — TELEPHONE (OUTPATIENT)
Dept: RHEUMATOLOGY | Facility: CLINIC | Age: 39
End: 2024-12-30
Payer: COMMERCIAL

## 2024-12-30 DIAGNOSIS — M32.9 SYSTEMIC LUPUS ERYTHEMATOSUS (H): ICD-10-CM

## 2024-12-30 DIAGNOSIS — L93.2 CUTANEOUS LUPUS ERYTHEMATOSUS: ICD-10-CM

## 2024-12-30 DIAGNOSIS — M32.9 SYSTEMIC LUPUS ERYTHEMATOSUS, UNSPECIFIED SLE TYPE, UNSPECIFIED ORGAN INVOLVEMENT STATUS (H): Primary | ICD-10-CM

## 2024-12-30 DIAGNOSIS — R79.89 ELEVATED LIVER FUNCTION TESTS: ICD-10-CM

## 2024-12-30 DIAGNOSIS — R79.89 ELEVATED LFTS: ICD-10-CM

## 2024-12-30 NOTE — TELEPHONE ENCOUNTER
azaTHIOprine (IMURAN) 50 MG tablet       Last Written Prescription Date:  6/4/24  Last Fill Quantity: 30,   # refills: 6  Last Office Visit: 6/4/24 Bush MG  Future Office visit:  7/7/25    CBC RESULTS:   Recent Labs   Lab Test 06/04/24  1612   WBC 8.6   RBC 4.65   HGB 13.1   HCT 40.0   MCV 86   MCH 28.2   MCHC 32.8   RDW 12.8          Creatinine   Date Value Ref Range Status   06/04/2024 0.82 0.51 - 0.95 mg/dL Final   ]    Liver Function Studies -   Recent Labs   Lab Test 06/04/24  1612   PROTTOTAL 7.6   ALBUMIN 4.3   BILITOTAL 0.5   ALKPHOS 106   AST 35   ALT 58*       Routing refill request to provider for review/approval because:  DMARD not on refill protocol/Labs overdue, was due 8-10/2024, Overdue office visit, due 10-12/2024, cancelled 12/3/24 visit/Provider to authorize.     Janiya HERNADEZ RN  [facility] Nursing/Red Flag Triage & Med Refill Team

## 2024-12-30 NOTE — TELEPHONE ENCOUNTER
12/30/2024 2:26PM  Patient Contacted for the patient to call back and schedule the following:    Appointment type: Labs  Provider: Per Dr. Bush  Return date: Next available  Specialty phone number: [phone]  Additional appointment(s) needed: NA  Additonal Notes: 12/30 Called to schedule lab per Dr. Bush to refill prescription. Appts offered today did not work for pt- provided [place] lab scheduling number so pt can check if she can walk-in. [facility]   Rheumatology, Gastroenterology, Nephrology, Infectious Disease, Pulmonology  [facility]

## 2024-12-30 NOTE — TELEPHONE ENCOUNTER
Patient wants to get labs done ASAP today, but orders not present. Patient states if done tomorrow, her insurance will change and she will have to pay completely out of pocket. Patient requesting a call back from the care team as soon as the orders are placed please, so she may call the lab right away.

## 2024-12-31 ENCOUNTER — MYC MEDICAL ADVICE (OUTPATIENT)
Dept: RHEUMATOLOGY | Facility: CLINIC | Age: 39
End: 2024-12-31
Payer: COMMERCIAL

## 2024-12-31 ENCOUNTER — LAB (OUTPATIENT)
Dept: LAB | Facility: OTHER | Age: 39
End: 2024-12-31
Attending: INTERNAL MEDICINE
Payer: COMMERCIAL

## 2024-12-31 DIAGNOSIS — M32.9 SYSTEMIC LUPUS ERYTHEMATOSUS (H): ICD-10-CM

## 2024-12-31 LAB
ALBUMIN MFR UR ELPH: 6.8 MG/DL
ALBUMIN SERPL BCG-MCNC: 4.3 G/DL (ref 3.5–5.2)
ALBUMIN UR-MCNC: NEGATIVE MG/DL
ALT SERPL W P-5'-P-CCNC: 92 U/L (ref 0–50)
APPEARANCE UR: CLEAR
AST SERPL W P-5'-P-CCNC: 46 U/L (ref 0–45)
BACTERIA #/AREA URNS HPF: ABNORMAL /HPF
BASOPHILS # BLD AUTO: 0 10E3/UL (ref 0–0.2)
BASOPHILS NFR BLD AUTO: 1 %
BILIRUB UR QL STRIP: NEGATIVE
COLOR UR AUTO: YELLOW
CREAT SERPL-MCNC: 0.79 MG/DL (ref 0.51–0.95)
CREAT UR-MCNC: 133.9 MG/DL
CREAT UR-MCNC: 134.7 MG/DL
CRP SERPL-MCNC: 26.57 MG/L
EGFRCR SERPLBLD CKD-EPI 2021: >90 ML/MIN/1.73M2
EOSINOPHIL # BLD AUTO: 0.5 10E3/UL (ref 0–0.7)
EOSINOPHIL NFR BLD AUTO: 8 %
ERYTHROCYTE [DISTWIDTH] IN BLOOD BY AUTOMATED COUNT: 12.3 % (ref 10–15)
ERYTHROCYTE [SEDIMENTATION RATE] IN BLOOD BY WESTERGREN METHOD: 19 MM/HR (ref 0–20)
GLUCOSE UR STRIP-MCNC: NEGATIVE MG/DL
HCT VFR BLD AUTO: 43.2 % (ref 35–47)
HGB BLD-MCNC: 14.5 G/DL (ref 11.7–15.7)
HGB UR QL STRIP: ABNORMAL
IMM GRANULOCYTES # BLD: 0 10E3/UL
IMM GRANULOCYTES NFR BLD: 0 %
KETONES UR STRIP-MCNC: NEGATIVE MG/DL
LEUKOCYTE ESTERASE UR QL STRIP: NEGATIVE
LYMPHOCYTES # BLD AUTO: 2 10E3/UL (ref 0.8–5.3)
LYMPHOCYTES NFR BLD AUTO: 32 %
MCH RBC QN AUTO: 29.2 PG (ref 26.5–33)
MCHC RBC AUTO-ENTMCNC: 33.6 G/DL (ref 31.5–36.5)
MCV RBC AUTO: 87 FL (ref 78–100)
MONOCYTES # BLD AUTO: 0.5 10E3/UL (ref 0–1.3)
MONOCYTES NFR BLD AUTO: 8 %
NEUTROPHILS # BLD AUTO: 3.2 10E3/UL (ref 1.6–8.3)
NEUTROPHILS NFR BLD AUTO: 51 %
NITRATE UR QL: NEGATIVE
PH UR STRIP: 5.5 [PH] (ref 5–7)
PLATELET # BLD AUTO: 318 10E3/UL (ref 150–450)
PROT/CREAT 24H UR: 0.05 MG/MG CR (ref 0–0.2)
RBC # BLD AUTO: 4.96 10E6/UL (ref 3.8–5.2)
RBC #/AREA URNS AUTO: ABNORMAL /HPF
SP GR UR STRIP: 1.02 (ref 1–1.03)
SQUAMOUS #/AREA URNS AUTO: ABNORMAL /LPF
UROBILINOGEN UR STRIP-ACNC: 0.2 E.U./DL
WBC # BLD AUTO: 6.3 10E3/UL (ref 4–11)
WBC #/AREA URNS AUTO: ABNORMAL /HPF

## 2024-12-31 PROCEDURE — 86225 DNA ANTIBODY NATIVE: CPT

## 2024-12-31 PROCEDURE — 86140 C-REACTIVE PROTEIN: CPT

## 2024-12-31 PROCEDURE — 36415 COLL VENOUS BLD VENIPUNCTURE: CPT

## 2024-12-31 PROCEDURE — 85652 RBC SED RATE AUTOMATED: CPT

## 2024-12-31 PROCEDURE — 85025 COMPLETE CBC W/AUTO DIFF WBC: CPT

## 2024-12-31 PROCEDURE — 82565 ASSAY OF CREATININE: CPT

## 2024-12-31 PROCEDURE — 81001 URINALYSIS AUTO W/SCOPE: CPT

## 2024-12-31 PROCEDURE — 84450 TRANSFERASE (AST) (SGOT): CPT

## 2024-12-31 PROCEDURE — 82040 ASSAY OF SERUM ALBUMIN: CPT

## 2024-12-31 PROCEDURE — 84460 ALANINE AMINO (ALT) (SGPT): CPT

## 2024-12-31 PROCEDURE — 86160 COMPLEMENT ANTIGEN: CPT

## 2024-12-31 PROCEDURE — 84156 ASSAY OF PROTEIN URINE: CPT

## 2024-12-31 PROCEDURE — 82570 ASSAY OF URINE CREATININE: CPT

## 2024-12-31 NOTE — TELEPHONE ENCOUNTER
Patient has been informed lab orders have been placed.      STEFANIA Hester  Rheumatology/Infectious disease  [facility]   Rheumatology ph:[phone]  Infectious Disease ph:[phone]

## 2024-12-31 NOTE — TELEPHONE ENCOUNTER
Patient has been informed via telephone.      STEFANIA Hester  Rheumatology/Infectious disease  [facility]   Rheumatology ph:[phone]  Infectious Disease ph:[phone]

## 2024-12-31 NOTE — TELEPHONE ENCOUNTER
Forward Talent message sent to patient informing her lab orders have been placed.      STEFANIA Hester  Rheumatology/Infectious disease  [facility]   Rheumatology ph:[phone]  Infectious Disease ph:[phone]

## 2025-01-01 RX ORDER — AZATHIOPRINE 50 MG/1
50 TABLET ORAL DAILY
Qty: 90 TABLET | Refills: 0 | Status: SHIPPED | OUTPATIENT
Start: 2025-01-01

## 2025-01-02 LAB
C3 SERPL-MCNC: 173 MG/DL (ref 81–157)
C4 SERPL-MCNC: 45 MG/DL (ref 13–39)
DSDNA AB SER-ACNC: 3.8 IU/ML

## 2025-01-23 ENCOUNTER — HOME INFUSION BILLING (OUTPATIENT)
Dept: HOME HEALTH SERVICES | Facility: HOME HEALTH | Age: 40
End: 2025-01-23
Payer: COMMERCIAL

## 2025-01-23 PROCEDURE — A4213 20+ CC SYRINGE ONLY: HCPCS

## 2025-01-23 PROCEDURE — E1399 DURABLE MEDICAL EQUIPMENT MI: HCPCS

## 2025-01-23 PROCEDURE — A4930 STERILE, GLOVES PER PAIR: HCPCS

## 2025-01-23 PROCEDURE — S1015 IV TUBING EXTENSION SET: HCPCS

## 2025-01-23 PROCEDURE — A4217 STERILE WATER/SALINE, 500 ML: HCPCS | Mod: JZ

## 2025-01-23 PROCEDURE — A4215 STERILE NEEDLE: HCPCS

## 2025-01-24 PROCEDURE — S9338 HIT IMMUNOTHERAPY DIEM: HCPCS

## 2025-01-29 PROCEDURE — E0781 EXTERNAL AMBULATORY INFUS PU: HCPCS | Mod: RR

## 2025-02-10 ENCOUNTER — TELEPHONE (OUTPATIENT)
Dept: HOME HEALTH SERVICES | Facility: HOME HEALTH | Age: 40
End: 2025-02-10
Payer: COMMERCIAL

## 2025-02-10 NOTE — TELEPHONE ENCOUNTER
TC from [place] asking if her 3/21/25 infusion can be 3/20/25 due to family vacation. Email sent to [facility] pharmacy team and billing team.

## 2025-02-20 ENCOUNTER — HOME INFUSION BILLING (OUTPATIENT)
Dept: HOME HEALTH SERVICES | Facility: HOME HEALTH | Age: 40
End: 2025-02-20
Payer: COMMERCIAL

## 2025-02-20 PROCEDURE — A4213 20+ CC SYRINGE ONLY: HCPCS

## 2025-02-20 PROCEDURE — A4930 STERILE, GLOVES PER PAIR: HCPCS

## 2025-02-20 PROCEDURE — S1015 IV TUBING EXTENSION SET: HCPCS

## 2025-02-20 PROCEDURE — A4217 STERILE WATER/SALINE, 500 ML: HCPCS | Mod: JZ

## 2025-02-20 PROCEDURE — A4215 STERILE NEEDLE: HCPCS

## 2025-02-20 PROCEDURE — E1399 DURABLE MEDICAL EQUIPMENT MI: HCPCS

## 2025-02-21 PROCEDURE — S9338 HIT IMMUNOTHERAPY DIEM: HCPCS

## 2025-02-26 ENCOUNTER — HOME INFUSION (OUTPATIENT)
Dept: HOME HEALTH SERVICES | Facility: HOME HEALTH | Age: 40
End: 2025-02-26
Payer: COMMERCIAL

## 2025-02-28 PROCEDURE — E0781 EXTERNAL AMBULATORY INFUS PU: HCPCS | Mod: RR

## 2025-03-09 ENCOUNTER — HEALTH MAINTENANCE LETTER (OUTPATIENT)
Age: 40
End: 2025-03-09

## 2025-03-19 ENCOUNTER — HOME INFUSION BILLING (OUTPATIENT)
Dept: HOME HEALTH SERVICES | Facility: HOME HEALTH | Age: 40
End: 2025-03-19
Payer: COMMERCIAL

## 2025-03-19 PROCEDURE — S1015 IV TUBING EXTENSION SET: HCPCS

## 2025-03-19 PROCEDURE — A4215 STERILE NEEDLE: HCPCS

## 2025-03-19 PROCEDURE — A4245 ALCOHOL WIPES PER BOX: HCPCS

## 2025-03-19 PROCEDURE — A4217 STERILE WATER/SALINE, 500 ML: HCPCS | Mod: JZ

## 2025-03-19 PROCEDURE — A4930 STERILE, GLOVES PER PAIR: HCPCS

## 2025-03-19 PROCEDURE — E1399 DURABLE MEDICAL EQUIPMENT MI: HCPCS

## 2025-03-19 PROCEDURE — A4213 20+ CC SYRINGE ONLY: HCPCS

## 2025-03-20 ENCOUNTER — HOME CARE VISIT (OUTPATIENT)
Dept: HOME HEALTH SERVICES | Facility: HOME HEALTH | Age: 40
End: 2025-03-20
Payer: COMMERCIAL

## 2025-03-20 VITALS
DIASTOLIC BLOOD PRESSURE: 78 MMHG | HEART RATE: 88 BPM | TEMPERATURE: 97.3 F | OXYGEN SATURATION: 98 % | RESPIRATION RATE: 16 BRPM | SYSTOLIC BLOOD PRESSURE: 122 MMHG

## 2025-03-20 PROCEDURE — S9338 HIT IMMUNOTHERAPY DIEM: HCPCS

## 2025-03-20 NOTE — PROGRESS NOTES
Nursing Visit Note:  Nurse visit today for Arvin for Dasia Nguyen.     present during visit today: Not Applicable.    Intravenous Access:  Implanted Port.    Infusion Nursing Note:    Pre-infusion Checklist:   Have you had any delayed reaction since last infusion?   No     Have you recently had an elevated temperature, fever, chills productive cough, coughing for 3 weeks or longer or hemoptysis, abnormal vital signs, night sweats, chest pain, or have you noticed a decrease in your appetite, or noted unexplained weight loss or fatigue?   No     Do you have any open wounds or new incisions?  No     Do you have any recent or upcoming hospitalizations, surgeries, or dental procedures? Does not include esophagogastroduodenoscopy, colonoscopy, endoscopic retrograde cholangiopancreatography (ERCP), endoscopic ultrasound (EUS), dental procedures or joint aspiration/steroid injections.   No     Do you currently have or recently have had any signs of illness or infection or are you on any antibiotics?   No     Have you had any new, sudden, or worsening abdominal pain?   No     Have you or anyone in your household received a live vaccination in the past 4 weeks?   No     Have you recently been diagnosed with any new nervous system diseases or cancer diagnosis? (i.e., Multiple Sclerosis, Guillain [place], seizures, neurological changes) Are you receiving any form of radiation or chemotherapy?   No     Are you pregnant or breastfeeding, or do you have plans of pregnancy in the future?   No     Have you been having any signs of worsening depression or suicidal ideation?  No     Have you had any other new onset medical symptoms?  No    Entyvio/Ocrevus/Tyasabri only: Have you been having any new or worsening medical problems such as issues with thinking, eyesight, balance or strength that have persisted over several days?   N/A    Benlysta only: Have you been having any signs of worsening depression or suicidal  "ideations?    No    IVIG only: Have you had any new blood clots?  N/A    Did the patient answer \"YES\" to any of the questions above?  No     Will the patient receive a medication that has an order for infusion reaction management?  Yes, and all drugs and supplies are available and none have .     If ordered, has the patient taken pre-medications?  Yes    Plan:   Therapy is appropriate, will proceed with treatment.     Post Infusion Assessment:  Patient tolerated infusion without incident.  Blood return noted pre and post infusion.  Site patent and intact, free from redness, edema or discomfort.  No evidence of extravasations.  Access discontinued per protocol.  Biologic Infusion Post Education: Call the triage nurse at your clinic or seek medical attention if you have chills and/or temperature greater than or equal to 100.5, uncontrolled nausea/vomiting, diarrhea, constipation, dizziness, shortness of breath, chest pain, heart palpitations, weakness or any other new or concerning symptoms, questions or concerns.  You cannot have any live virus vaccines prior to or during treatment or up to 6 months post infusion.  If you have an upcoming surgery, medical procedure or dental procedure during treatment, this should be discussed with your ordering physician and your surgeon/dentist.  If you are having any concerning symptom, if you are unsure if you should get your next infusion or wish to speak to a provider before your next infusion, please call your care coordinator or triage nurse at your clinic to notify them so we can adequately serve you.     Note: [place] is alert and oriented, feeling ok today. Fingers of both hands are swollen and painful, and jone wrists and feet hurt as well. She states she often feels like her symptoms return about a week before she is due for her next infusion. Overall no new concerns or complaints. She also reported she will be having surgery in a couple of weeks on her ankle and foot. " Her provider does want her to continue to receive her Benlysta prior to surgery. Infusion tolerated well today, Benlysta along with concurrent IVF.  Port left accessed per or request so that she can self-administer IVF as she feels more side effects from infusion over the next couple of days. Pt has been trained on flushing and deaccessing her port, verbalized process. No further concerns today.    Saline administered: 50 (ml)    Supply Check:   Does the patient have all the supplies they need for the next visit?  Yes    Next visit plan: April 18    Betty Almanza RN 3/20/2025

## 2025-03-28 PROCEDURE — E0781 EXTERNAL AMBULATORY INFUS PU: HCPCS | Mod: RR

## 2025-04-17 ENCOUNTER — HOME INFUSION BILLING (OUTPATIENT)
Dept: HOME HEALTH SERVICES | Facility: HOME HEALTH | Age: 40
End: 2025-04-17
Payer: COMMERCIAL

## 2025-04-18 ENCOUNTER — HOME CARE VISIT (OUTPATIENT)
Dept: HOME HEALTH SERVICES | Facility: HOME HEALTH | Age: 40
End: 2025-04-18
Payer: COMMERCIAL

## 2025-04-18 VITALS
SYSTOLIC BLOOD PRESSURE: 100 MMHG | OXYGEN SATURATION: 95 % | RESPIRATION RATE: 16 BRPM | TEMPERATURE: 97.8 F | DIASTOLIC BLOOD PRESSURE: 62 MMHG | HEART RATE: 90 BPM

## 2025-04-18 NOTE — PROGRESS NOTES
Nursing Visit Note:  Nurse visit today for Benlysta infusion for [place] Nguyen      present during visit today: Not Applicable.     Intravenous Access:  Implanted Port.     Infusion Nursing Note:     Pre-infusion Checklist:   Have you had any delayed reaction since last infusion?   No     Have you recently had an elevated temperature, fever, chills productive cough, coughing for 3 weeks or longer or hemoptysis, abnormal vital signs, night sweats, chest pain, or have you noticed a decrease in your appetite, or noted unexplained weight loss or fatigue?   No     Do you have any open wounds or new incisions?  Yes, Pt had foot surgery on 4/1. had surgery follow up on 4/14 and incision is healing well. Dr. Bush was notified and okay s her to continue infusions.      Do you have any recent or upcoming hospitalizations, surgeries, or dental procedures? Does not include esophagogastroduodenoscopy, colonoscopy, endoscopic retrograde cholangiopancreatography (ERCP), endoscopic ultrasound (EUS), dental procedures or joint aspiration/steroid injections.   No     Do you currently have or recently have had any signs of illness or infection or are you on any antibiotics?   No     Have you had any new, sudden, or worsening abdominal pain?   No     Have you or anyone in your household received a live vaccination in the past 4 weeks?   No     Have you recently been diagnosed with any new nervous system diseases or cancer diagnosis? (i.e., Multiple Sclerosis, Guillain [place], seizures, neurological changes) Are you receiving any form of radiation or chemotherapy?   No     Are you pregnant or breastfeeding, or do you have plans of pregnancy in the future?   No     Have you been having any signs of worsening depression or suicidal ideation?  No     Have you had any other new onset medical symptoms?  No     Entyvio/Ocrevus/Tyasabri only: Have you been having any new or worsening medical problems such as issues with thinking,  "eyesight, balance or strength that have persisted over several days?   N/A     Benlysta only: Have you been having any signs of worsening depression or suicidal ideations?    No     IVIG only: Have you had any new blood clots?  N/A     Did the patient answer \"YES\" to any of the questions above?  No     Will the patient receive a medication that has an order for infusion reaction management?  Yes, and all drugs and supplies are available and none have .     If ordered, has the patient taken pre-medications?  Yes     Plan:   Therapy is appropriate, will proceed with treatment.      Post Infusion Assessment:  Patient tolerated infusion without incident.  Blood return noted pre and post infusion.        Note: Pt feeling well today; recently had foot surgery; states both surgeon and Dr. Bush aware and approved infusions. Incision healing well; she will continue to monitor.     Pt stays accessed to self administer IVF on Monday and then will de-access herself.      Saline administered: 40 (ml)     Supply Check:   Does the patient have all the supplies they need for the next visit?  Yes     Next visit plan: next infusion scheduling request in place for      Zeenat Oakley RN 2025  "

## 2025-04-28 PROCEDURE — E0781 EXTERNAL AMBULATORY INFUS PU: HCPCS | Mod: RR

## 2025-05-09 ENCOUNTER — HOME INFUSION (OUTPATIENT)
Dept: HOME HEALTH SERVICES | Facility: HOME HEALTH | Age: 40
End: 2025-05-09
Payer: COMMERCIAL

## 2025-05-15 ENCOUNTER — HOME INFUSION BILLING (OUTPATIENT)
Dept: HOME HEALTH SERVICES | Facility: HOME HEALTH | Age: 40
End: 2025-05-15
Payer: COMMERCIAL

## 2025-05-15 PROCEDURE — E1399 DURABLE MEDICAL EQUIPMENT MI: HCPCS

## 2025-05-15 PROCEDURE — S1015 IV TUBING EXTENSION SET: HCPCS

## 2025-05-15 PROCEDURE — A4217 STERILE WATER/SALINE, 500 ML: HCPCS | Mod: JZ

## 2025-05-15 PROCEDURE — A4215 STERILE NEEDLE: HCPCS

## 2025-05-15 PROCEDURE — A4213 20+ CC SYRINGE ONLY: HCPCS

## 2025-05-16 PROCEDURE — S9338 HIT IMMUNOTHERAPY DIEM: HCPCS

## 2025-05-22 ENCOUNTER — MEDICAL CORRESPONDENCE (OUTPATIENT)
Dept: HEALTH INFORMATION MANAGEMENT | Facility: CLINIC | Age: 40
End: 2025-05-22
Payer: COMMERCIAL

## 2025-05-27 ENCOUNTER — TELEPHONE (OUTPATIENT)
Dept: DERMATOLOGY | Facility: CLINIC | Age: 40
End: 2025-05-27
Payer: COMMERCIAL

## 2025-05-27 NOTE — TELEPHONE ENCOUNTER
This encounter is being sent to inform the clinic that this patient has a referral from Altagracia Moreira NP from [facility] in [place] (Phone: [phone], Fax: [phone]) for the diagnoses of SEE BELOW and has requested that this patient be seen within Urgent: 3-5 Days and/or with Urgent: 3-5 Days. Based on the availability of our provider(s), we are unable to accommodate this request.    Were all sites offered this patient?  Yes  Prefers MD in [place] if possible, or [facility] - specialist who can see for all this  Does scheduling algorithm request to schedule next available?  Patient appointment has not been scheduled.  Please review the referral request for accommodation and contact the patient.  If unable to accommodate, please resubmit a referral and indicate a preferred partner or affiliate location using Provider Finder or Scheduling Instructions field.     REFERRED FOR :    Neoplasm of uncertain behavior of skin   Skin Lesion  Pink Papule with telangiectasia located on the left cheek. 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

## 2025-05-27 NOTE — TELEPHONE ENCOUNTER
Called and spoke to pt, she is confused as to why dermatology was calling her. Per referral:    Pink Papule with telangiectasia located on the left cheek. 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    Pt stated she had her left cheek biopsied in Oct 2024 and it was not something that needed further treatment. 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 She stated the referral should have been for oncology but when oncology called her they informed her that they do not test for mast cell activation syndrome and that [facility]    Since referral unclear and notes do not discuss this writer called referring clinic to gather more information and left VM for them to return our call to discuss. Does she need to see derm? Does she need a biopsy? We need path results as those were not sent with the referral. Will most likely need to see Girish. [facility] [place] autoimmune opening on 6/20.    Sierra Handy RN on 5/27/2025 at 11:46 AM

## 2025-05-27 NOTE — TELEPHONE ENCOUNTER
Per protocol: Our clinics do not see for this. Refer these patients to Dr. Katia Ambrocio at [facility]. Their phone number is [phone].    Tanya BOWEN, [facility] - Dermatology

## 2025-05-28 PROCEDURE — E0781 EXTERNAL AMBULATORY INFUS PU: HCPCS | Mod: RR

## 2025-05-29 ENCOUNTER — LAB (OUTPATIENT)
Dept: LAB | Facility: OTHER | Age: 40
End: 2025-05-29
Payer: COMMERCIAL

## 2025-05-29 DIAGNOSIS — M32.9 SYSTEMIC LUPUS ERYTHEMATOSUS (H): ICD-10-CM

## 2025-05-29 LAB
ALBUMIN MFR UR ELPH: 7.9 MG/DL
ALBUMIN SERPL BCG-MCNC: 4 G/DL (ref 3.5–5.2)
ALBUMIN UR-MCNC: NEGATIVE MG/DL
ALT SERPL W P-5'-P-CCNC: 88 U/L (ref 0–50)
APPEARANCE UR: CLEAR
AST SERPL W P-5'-P-CCNC: 48 U/L (ref 0–45)
BACTERIA #/AREA URNS HPF: ABNORMAL /HPF
BASOPHILS # BLD AUTO: 0.1 10E3/UL (ref 0–0.2)
BASOPHILS NFR BLD AUTO: 1 %
BILIRUB UR QL STRIP: NEGATIVE
COLOR UR AUTO: YELLOW
CREAT SERPL-MCNC: 0.74 MG/DL (ref 0.51–0.95)
CREAT UR-MCNC: 192.9 MG/DL
CRP SERPL-MCNC: 7.34 MG/L
EGFRCR SERPLBLD CKD-EPI 2021: >90 ML/MIN/1.73M2
EOSINOPHIL # BLD AUTO: 0.7 10E3/UL (ref 0–0.7)
EOSINOPHIL NFR BLD AUTO: 8 %
ERYTHROCYTE [DISTWIDTH] IN BLOOD BY AUTOMATED COUNT: 12.4 % (ref 10–15)
ERYTHROCYTE [SEDIMENTATION RATE] IN BLOOD BY WESTERGREN METHOD: 24 MM/HR (ref 0–20)
GLUCOSE UR STRIP-MCNC: NEGATIVE MG/DL
HCT VFR BLD AUTO: 39.9 % (ref 35–47)
HGB BLD-MCNC: 13.4 G/DL (ref 11.7–15.7)
HGB UR QL STRIP: ABNORMAL
IMM GRANULOCYTES # BLD: 0 10E3/UL
IMM GRANULOCYTES NFR BLD: 1 %
KETONES UR STRIP-MCNC: NEGATIVE MG/DL
LEUKOCYTE ESTERASE UR QL STRIP: NEGATIVE
LYMPHOCYTES # BLD AUTO: 2.7 10E3/UL (ref 0.8–5.3)
LYMPHOCYTES NFR BLD AUTO: 32 %
MCH RBC QN AUTO: 28.8 PG (ref 26.5–33)
MCHC RBC AUTO-ENTMCNC: 33.6 G/DL (ref 31.5–36.5)
MCV RBC AUTO: 86 FL (ref 78–100)
MONOCYTES # BLD AUTO: 0.6 10E3/UL (ref 0–1.3)
MONOCYTES NFR BLD AUTO: 6 %
NEUTROPHILS # BLD AUTO: 4.6 10E3/UL (ref 1.6–8.3)
NEUTROPHILS NFR BLD AUTO: 53 %
NITRATE UR QL: NEGATIVE
PH UR STRIP: 5.5 [PH] (ref 5–7)
PLATELET # BLD AUTO: 368 10E3/UL (ref 150–450)
PROT/CREAT 24H UR: 0.04 MG/MG CR (ref 0–0.2)
RBC # BLD AUTO: 4.65 10E6/UL (ref 3.8–5.2)
RBC #/AREA URNS AUTO: ABNORMAL /HPF
SP GR UR STRIP: >=1.03 (ref 1–1.03)
SQUAMOUS #/AREA URNS AUTO: ABNORMAL /LPF
UROBILINOGEN UR STRIP-ACNC: 0.2 E.U./DL
WBC # BLD AUTO: 8.6 10E3/UL (ref 4–11)
WBC #/AREA URNS AUTO: ABNORMAL /HPF

## 2025-05-31 LAB — DSDNA AB SER-ACNC: 3.2 IU/ML

## 2025-06-02 NOTE — TELEPHONE ENCOUNTER
Called referring clinic and informed them that we do not have dermatology providers that see for mast cell. She was given complex cares phone number to see if they can get her referred to the correct department. Closing referral for dermatology.    Sierra Handy RN on 6/2/2025 at 8:21 AM

## 2025-06-02 NOTE — TELEPHONE ENCOUNTER
Spoke to Dr Stout and he does not see pt's for this. Will call referring clinc once they are open.    Sierra Handy RN on 6/2/2025 at 7:54 AM

## 2025-06-04 ENCOUNTER — HOME INFUSION (OUTPATIENT)
Dept: HOME HEALTH SERVICES | Facility: HOME HEALTH | Age: 40
End: 2025-06-04
Payer: COMMERCIAL

## 2025-06-19 ENCOUNTER — HOME INFUSION BILLING (OUTPATIENT)
Dept: HOME HEALTH SERVICES | Facility: HOME HEALTH | Age: 40
End: 2025-06-19
Payer: COMMERCIAL

## 2025-06-19 PROCEDURE — A4213 20+ CC SYRINGE ONLY: HCPCS

## 2025-06-19 PROCEDURE — A4215 STERILE NEEDLE: HCPCS

## 2025-06-19 PROCEDURE — A4217 STERILE WATER/SALINE, 500 ML: HCPCS | Mod: JZ

## 2025-06-19 PROCEDURE — S1015 IV TUBING EXTENSION SET: HCPCS

## 2025-06-19 PROCEDURE — E1399 DURABLE MEDICAL EQUIPMENT MI: HCPCS

## 2025-06-20 PROCEDURE — S9338 HIT IMMUNOTHERAPY DIEM: HCPCS

## 2025-06-28 PROCEDURE — E0781 EXTERNAL AMBULATORY INFUS PU: HCPCS | Mod: RR

## 2025-07-15 ENCOUNTER — HOME INFUSION (OUTPATIENT)
Dept: HOME HEALTH SERVICES | Facility: HOME HEALTH | Age: 40
End: 2025-07-15
Payer: COMMERCIAL

## 2025-07-17 ENCOUNTER — HOME INFUSION BILLING (OUTPATIENT)
Dept: HOME HEALTH SERVICES | Facility: HOME HEALTH | Age: 40
End: 2025-07-17
Payer: COMMERCIAL

## 2025-07-17 PROCEDURE — A4217 STERILE WATER/SALINE, 500 ML: HCPCS | Mod: JZ

## 2025-07-17 PROCEDURE — A4213 20+ CC SYRINGE ONLY: HCPCS

## 2025-07-17 PROCEDURE — E1399 DURABLE MEDICAL EQUIPMENT MI: HCPCS

## 2025-07-17 PROCEDURE — A4215 STERILE NEEDLE: HCPCS

## 2025-07-17 PROCEDURE — S1015 IV TUBING EXTENSION SET: HCPCS

## 2025-07-18 PROCEDURE — S9338 HIT IMMUNOTHERAPY DIEM: HCPCS

## 2025-07-22 ENCOUNTER — HOME INFUSION (OUTPATIENT)
Dept: HOME HEALTH SERVICES | Facility: HOME HEALTH | Age: 40
End: 2025-07-22
Payer: COMMERCIAL

## 2025-07-22 DIAGNOSIS — M32.9 SYSTEMIC LUPUS ERYTHEMATOSUS, UNSPECIFIED SLE TYPE, UNSPECIFIED ORGAN INVOLVEMENT STATUS (H): Primary | ICD-10-CM

## 2025-07-28 PROCEDURE — E0781 EXTERNAL AMBULATORY INFUS PU: HCPCS | Mod: RR

## 2025-08-14 ENCOUNTER — HOME INFUSION BILLING (OUTPATIENT)
Dept: HOME HEALTH SERVICES | Facility: HOME HEALTH | Age: 40
End: 2025-08-14
Payer: COMMERCIAL

## 2025-08-14 PROCEDURE — A4213 20+ CC SYRINGE ONLY: HCPCS

## 2025-08-14 PROCEDURE — E1399 DURABLE MEDICAL EQUIPMENT MI: HCPCS

## 2025-08-14 PROCEDURE — A4215 STERILE NEEDLE: HCPCS

## 2025-08-14 PROCEDURE — S1015 IV TUBING EXTENSION SET: HCPCS

## 2025-08-14 PROCEDURE — A4217 STERILE WATER/SALINE, 500 ML: HCPCS | Mod: JZ

## 2025-08-15 PROCEDURE — S9338 HIT IMMUNOTHERAPY DIEM: HCPCS

## 2025-08-28 PROCEDURE — E0781 EXTERNAL AMBULATORY INFUS PU: HCPCS | Mod: RR

## 2025-09-28 PROCEDURE — E0781 EXTERNAL AMBULATORY INFUS PU: HCPCS | Mod: RR

## 2025-10-28 PROCEDURE — E0781 EXTERNAL AMBULATORY INFUS PU: HCPCS | Mod: RR

## 2025-11-28 PROCEDURE — E0781 EXTERNAL AMBULATORY INFUS PU: HCPCS | Mod: RR

## 2025-12-28 PROCEDURE — E0781 EXTERNAL AMBULATORY INFUS PU: HCPCS | Mod: RR

## (undated) RX ORDER — HEPARIN SODIUM (PORCINE) LOCK FLUSH IV SOLN 100 UNIT/ML 100 UNIT/ML
SOLUTION INTRAVENOUS
Status: DISPENSED
Start: 2023-03-31

## (undated) RX ORDER — DIPHENHYDRAMINE HYDROCHLORIDE 50 MG/ML
INJECTION INTRAMUSCULAR; INTRAVENOUS
Status: DISPENSED
Start: 2024-03-15

## (undated) RX ORDER — FENTANYL CITRATE 50 UG/ML
INJECTION, SOLUTION INTRAMUSCULAR; INTRAVENOUS
Status: DISPENSED
Start: 2024-03-15

## (undated) RX ORDER — FENTANYL CITRATE 50 UG/ML
INJECTION, SOLUTION INTRAMUSCULAR; INTRAVENOUS
Status: DISPENSED
Start: 2023-03-31

## (undated) RX ORDER — GINSENG 100 MG
CAPSULE ORAL
Status: DISPENSED
Start: 2023-03-31

## (undated) RX ORDER — LIDOCAINE HYDROCHLORIDE 10 MG/ML
INJECTION, SOLUTION INFILTRATION; PERINEURAL
Status: DISPENSED
Start: 2023-03-31